# Patient Record
Sex: MALE | Race: WHITE | NOT HISPANIC OR LATINO | Employment: OTHER | URBAN - METROPOLITAN AREA
[De-identification: names, ages, dates, MRNs, and addresses within clinical notes are randomized per-mention and may not be internally consistent; named-entity substitution may affect disease eponyms.]

---

## 2019-12-09 ENCOUNTER — APPOINTMENT (OUTPATIENT)
Dept: CT IMAGING | Facility: HOSPITAL | Age: 84
End: 2019-12-09

## 2019-12-09 ENCOUNTER — APPOINTMENT (OUTPATIENT)
Dept: GENERAL RADIOLOGY | Facility: HOSPITAL | Age: 84
End: 2019-12-09

## 2019-12-09 ENCOUNTER — HOSPITAL ENCOUNTER (INPATIENT)
Facility: HOSPITAL | Age: 84
LOS: 5 days | Discharge: NURSING, REHAB OR LONG-TERM CARE | End: 2019-12-14
Attending: EMERGENCY MEDICINE | Admitting: INTERNAL MEDICINE

## 2019-12-09 ENCOUNTER — APPOINTMENT (OUTPATIENT)
Dept: CARDIOLOGY | Facility: HOSPITAL | Age: 84
End: 2019-12-09

## 2019-12-09 DIAGNOSIS — Z74.09 IMPAIRED MOBILITY AND ADLS: ICD-10-CM

## 2019-12-09 DIAGNOSIS — R47.1 DYSARTHRIA: Primary | ICD-10-CM

## 2019-12-09 DIAGNOSIS — Z78.9 IMPAIRED MOBILITY AND ADLS: ICD-10-CM

## 2019-12-09 DIAGNOSIS — R53.1 LEFT-SIDED WEAKNESS: ICD-10-CM

## 2019-12-09 DIAGNOSIS — I63.9 ACUTE CVA (CEREBROVASCULAR ACCIDENT) (HCC): ICD-10-CM

## 2019-12-09 DIAGNOSIS — R41.841 COGNITIVE COMMUNICATION DISORDER: ICD-10-CM

## 2019-12-09 DIAGNOSIS — R13.12 OROPHARYNGEAL DYSPHAGIA: ICD-10-CM

## 2019-12-09 PROBLEM — N17.9 AKI (ACUTE KIDNEY INJURY) (HCC): Status: ACTIVE | Noted: 2019-12-09

## 2019-12-09 PROBLEM — I10 ESSENTIAL HYPERTENSION: Status: ACTIVE | Noted: 2019-12-09

## 2019-12-09 LAB
ALT SERPL W P-5'-P-CCNC: 16 U/L (ref 1–41)
APTT PPP: 29.7 SECONDS (ref 24–37)
AST SERPL-CCNC: 21 U/L (ref 1–40)
BASOPHILS # BLD AUTO: 0.03 10*3/MM3 (ref 0–0.2)
BASOPHILS NFR BLD AUTO: 0.3 % (ref 0–1.5)
BH CV XLRA MEAS LEFT CCA RATIO VEL: 45.8 CM/SEC
BH CV XLRA MEAS LEFT DIST CCA EDV: 12.2 CM/SEC
BH CV XLRA MEAS LEFT DIST CCA PSV: 46.7 CM/SEC
BH CV XLRA MEAS LEFT DIST ICA EDV: 14.4 CM/SEC
BH CV XLRA MEAS LEFT DIST ICA PSV: 55.4 CM/SEC
BH CV XLRA MEAS LEFT ICA RATIO VEL: 75.1 CM/SEC
BH CV XLRA MEAS LEFT ICA/CCA RATIO: 1.6
BH CV XLRA MEAS LEFT MID CCA EDV: 11.8 CM/SEC
BH CV XLRA MEAS LEFT MID CCA PSV: 46.3 CM/SEC
BH CV XLRA MEAS LEFT MID ICA EDV: 17.5 CM/SEC
BH CV XLRA MEAS LEFT MID ICA PSV: 75.5 CM/SEC
BH CV XLRA MEAS LEFT PROX CCA EDV: 9.2 CM/SEC
BH CV XLRA MEAS LEFT PROX CCA PSV: 52.8 CM/SEC
BH CV XLRA MEAS LEFT PROX ECA PSV: 85.6 CM/SEC
BH CV XLRA MEAS LEFT PROX ICA EDV: 12.7 CM/SEC
BH CV XLRA MEAS LEFT PROX ICA PSV: 51.1 CM/SEC
BH CV XLRA MEAS LEFT PROX SCLA PSV: 52.9 CM/SEC
BH CV XLRA MEAS LEFT VERTEBRAL A PSV: 35.8 CM/SEC
BH CV XLRA MEAS RIGHT CCA RATIO VEL: 50.6 CM/SEC
BH CV XLRA MEAS RIGHT DIST CCA EDV: 8.3 CM/SEC
BH CV XLRA MEAS RIGHT DIST CCA PSV: 50.2 CM/SEC
BH CV XLRA MEAS RIGHT DIST ICA EDV: 18.9 CM/SEC
BH CV XLRA MEAS RIGHT DIST ICA PSV: 72.3 CM/SEC
BH CV XLRA MEAS RIGHT ICA RATIO VEL: 62.2 CM/SEC
BH CV XLRA MEAS RIGHT ICA/CCA RATIO: 1.2
BH CV XLRA MEAS RIGHT MID CCA EDV: 10.9 CM/SEC
BH CV XLRA MEAS RIGHT MID CCA PSV: 51.1 CM/SEC
BH CV XLRA MEAS RIGHT MID ICA EDV: 13.6 CM/SEC
BH CV XLRA MEAS RIGHT MID ICA PSV: 62.5 CM/SEC
BH CV XLRA MEAS RIGHT PROX CCA EDV: 12.7 CM/SEC
BH CV XLRA MEAS RIGHT PROX CCA PSV: 70.3 CM/SEC
BH CV XLRA MEAS RIGHT PROX ECA PSV: 103.1 CM/SEC
BH CV XLRA MEAS RIGHT PROX ICA EDV: 10.3 CM/SEC
BH CV XLRA MEAS RIGHT PROX ICA PSV: 30.2 CM/SEC
BH CV XLRA MEAS RIGHT PROX SCLA PSV: 53.7 CM/SEC
BH CV XLRA MEAS RIGHT VERTEBRAL A PSV: 34.7 CM/SEC
BUN BLDA-MCNC: 27 MG/DL (ref 8–26)
CA-I BLDA-SCNC: 1.11 MMOL/L (ref 1.2–1.32)
CHLORIDE BLDA-SCNC: 99 MMOL/L (ref 98–109)
CO2 BLDA-SCNC: 30 MMOL/L (ref 24–29)
CREAT BLDA-MCNC: 1.6 MG/DL (ref 0.6–1.3)
DEPRECATED RDW RBC AUTO: 48.4 FL (ref 37–54)
EOSINOPHIL # BLD AUTO: 0.07 10*3/MM3 (ref 0–0.4)
EOSINOPHIL NFR BLD AUTO: 0.8 % (ref 0.3–6.2)
ERYTHROCYTE [DISTWIDTH] IN BLOOD BY AUTOMATED COUNT: 13.3 % (ref 12.3–15.4)
GLUCOSE BLDC GLUCOMTR-MCNC: 100 MG/DL (ref 70–130)
GLUCOSE BLDC GLUCOMTR-MCNC: 100 MG/DL (ref 70–130)
GLUCOSE BLDC GLUCOMTR-MCNC: 173 MG/DL (ref 70–130)
HCT VFR BLD AUTO: 39.6 % (ref 37.5–51)
HCT VFR BLDA CALC: 39 % (ref 38–51)
HGB BLD-MCNC: 12.9 G/DL (ref 13–17.7)
HGB BLDA-MCNC: 13.3 G/DL (ref 12–17)
HOLD SPECIMEN: NORMAL
HOLD SPECIMEN: NORMAL
IMM GRANULOCYTES # BLD AUTO: 0.05 10*3/MM3 (ref 0–0.05)
IMM GRANULOCYTES NFR BLD AUTO: 0.6 % (ref 0–0.5)
INR PPP: 1 (ref 0.8–1.2)
LYMPHOCYTES # BLD AUTO: 0.68 10*3/MM3 (ref 0.7–3.1)
LYMPHOCYTES NFR BLD AUTO: 7.7 % (ref 19.6–45.3)
MCH RBC QN AUTO: 32.3 PG (ref 26.6–33)
MCHC RBC AUTO-ENTMCNC: 32.6 G/DL (ref 31.5–35.7)
MCV RBC AUTO: 99 FL (ref 79–97)
MONOCYTES # BLD AUTO: 0.89 10*3/MM3 (ref 0.1–0.9)
MONOCYTES NFR BLD AUTO: 10.1 % (ref 5–12)
NEUTROPHILS # BLD AUTO: 7.11 10*3/MM3 (ref 1.7–7)
NEUTROPHILS NFR BLD AUTO: 80.5 % (ref 42.7–76)
NRBC BLD AUTO-RTO: 0 /100 WBC (ref 0–0.2)
PLATELET # BLD AUTO: 197 10*3/MM3 (ref 140–450)
PMV BLD AUTO: 10.3 FL (ref 6–12)
POTASSIUM BLDA-SCNC: 3.7 MMOL/L (ref 3.5–4.9)
PROTHROMBIN TIME: 12.4 SECONDS (ref 12.8–15.2)
RBC # BLD AUTO: 4 10*6/MM3 (ref 4.14–5.8)
SODIUM BLDA-SCNC: 140 MMOL/L (ref 138–146)
TROPONIN T SERPL-MCNC: 0.06 NG/ML (ref 0–0.03)
TROPONIN T SERPL-MCNC: 0.09 NG/ML (ref 0–0.03)
WBC NRBC COR # BLD: 8.83 10*3/MM3 (ref 3.4–10.8)
WHOLE BLOOD HOLD SPECIMEN: NORMAL
WHOLE BLOOD HOLD SPECIMEN: NORMAL

## 2019-12-09 PROCEDURE — 93880 EXTRACRANIAL BILAT STUDY: CPT

## 2019-12-09 PROCEDURE — 0042T HC CT CEREBRAL PERFUSION W/WO CONTRAST: CPT

## 2019-12-09 PROCEDURE — 0 IOPAMIDOL PER 1 ML: Performed by: EMERGENCY MEDICINE

## 2019-12-09 PROCEDURE — 93005 ELECTROCARDIOGRAM TRACING: CPT | Performed by: EMERGENCY MEDICINE

## 2019-12-09 PROCEDURE — 99285 EMERGENCY DEPT VISIT HI MDM: CPT

## 2019-12-09 PROCEDURE — 85610 PROTHROMBIN TIME: CPT

## 2019-12-09 PROCEDURE — 82962 GLUCOSE BLOOD TEST: CPT

## 2019-12-09 PROCEDURE — 85014 HEMATOCRIT: CPT

## 2019-12-09 PROCEDURE — 84460 ALANINE AMINO (ALT) (SGPT): CPT | Performed by: EMERGENCY MEDICINE

## 2019-12-09 PROCEDURE — 99222 1ST HOSP IP/OBS MODERATE 55: CPT | Performed by: INTERNAL MEDICINE

## 2019-12-09 PROCEDURE — 85025 COMPLETE CBC W/AUTO DIFF WBC: CPT | Performed by: EMERGENCY MEDICINE

## 2019-12-09 PROCEDURE — 84450 TRANSFERASE (AST) (SGOT): CPT | Performed by: EMERGENCY MEDICINE

## 2019-12-09 PROCEDURE — 70498 CT ANGIOGRAPHY NECK: CPT

## 2019-12-09 PROCEDURE — 80047 BASIC METABLC PNL IONIZED CA: CPT

## 2019-12-09 PROCEDURE — 93880 EXTRACRANIAL BILAT STUDY: CPT | Performed by: INTERNAL MEDICINE

## 2019-12-09 PROCEDURE — 63710000001 ATORVASTATIN 40 MG TABLET: Performed by: INTERNAL MEDICINE

## 2019-12-09 PROCEDURE — 92610 EVALUATE SWALLOWING FUNCTION: CPT

## 2019-12-09 PROCEDURE — 71045 X-RAY EXAM CHEST 1 VIEW: CPT

## 2019-12-09 PROCEDURE — 70450 CT HEAD/BRAIN W/O DYE: CPT

## 2019-12-09 PROCEDURE — 84484 ASSAY OF TROPONIN QUANT: CPT | Performed by: EMERGENCY MEDICINE

## 2019-12-09 PROCEDURE — A9270 NON-COVERED ITEM OR SERVICE: HCPCS | Performed by: INTERNAL MEDICINE

## 2019-12-09 PROCEDURE — 70496 CT ANGIOGRAPHY HEAD: CPT

## 2019-12-09 PROCEDURE — 63710000001 ASPIRIN 81 MG CHEWABLE TABLET: Performed by: INTERNAL MEDICINE

## 2019-12-09 PROCEDURE — 85730 THROMBOPLASTIN TIME PARTIAL: CPT | Performed by: EMERGENCY MEDICINE

## 2019-12-09 RX ORDER — ASPIRIN 300 MG/1
300 SUPPOSITORY RECTAL DAILY
Status: DISCONTINUED | OUTPATIENT
Start: 2019-12-09 | End: 2019-12-12

## 2019-12-09 RX ORDER — HYDROCHLOROTHIAZIDE 25 MG/1
25 TABLET ORAL DAILY
COMMUNITY
End: 2020-02-07 | Stop reason: HOSPADM

## 2019-12-09 RX ORDER — SODIUM CHLORIDE 0.9 % (FLUSH) 0.9 %
10 SYRINGE (ML) INJECTION EVERY 12 HOURS SCHEDULED
Status: DISCONTINUED | OUTPATIENT
Start: 2019-12-09 | End: 2019-12-14 | Stop reason: HOSPADM

## 2019-12-09 RX ORDER — SODIUM CHLORIDE 0.9 % (FLUSH) 0.9 %
10 SYRINGE (ML) INJECTION AS NEEDED
Status: DISCONTINUED | OUTPATIENT
Start: 2019-12-09 | End: 2019-12-14 | Stop reason: HOSPADM

## 2019-12-09 RX ORDER — ONDANSETRON 2 MG/ML
4 INJECTION INTRAMUSCULAR; INTRAVENOUS EVERY 6 HOURS PRN
Status: DISCONTINUED | OUTPATIENT
Start: 2019-12-09 | End: 2019-12-14 | Stop reason: HOSPADM

## 2019-12-09 RX ORDER — SODIUM CHLORIDE 9 MG/ML
100 INJECTION, SOLUTION INTRAVENOUS CONTINUOUS
Status: ACTIVE | OUTPATIENT
Start: 2019-12-09 | End: 2019-12-10

## 2019-12-09 RX ORDER — ACETAMINOPHEN 325 MG/1
650 TABLET ORAL EVERY 4 HOURS PRN
Status: DISCONTINUED | OUTPATIENT
Start: 2019-12-09 | End: 2019-12-14 | Stop reason: HOSPADM

## 2019-12-09 RX ORDER — LOSARTAN POTASSIUM 25 MG/1
25 TABLET ORAL DAILY
Status: ON HOLD | COMMUNITY
End: 2020-02-07 | Stop reason: SDUPTHER

## 2019-12-09 RX ORDER — ACETAMINOPHEN 650 MG/1
650 SUPPOSITORY RECTAL EVERY 4 HOURS PRN
Status: DISCONTINUED | OUTPATIENT
Start: 2019-12-09 | End: 2019-12-14 | Stop reason: HOSPADM

## 2019-12-09 RX ORDER — ACETAMINOPHEN 160 MG/5ML
650 SOLUTION ORAL EVERY 4 HOURS PRN
Status: DISCONTINUED | OUTPATIENT
Start: 2019-12-09 | End: 2019-12-14 | Stop reason: HOSPADM

## 2019-12-09 RX ORDER — ATORVASTATIN CALCIUM 40 MG/1
80 TABLET, FILM COATED ORAL NIGHTLY
Status: DISCONTINUED | OUTPATIENT
Start: 2019-12-09 | End: 2019-12-14 | Stop reason: HOSPADM

## 2019-12-09 RX ORDER — ASPIRIN 81 MG/1
81 TABLET, CHEWABLE ORAL DAILY
Status: DISCONTINUED | OUTPATIENT
Start: 2019-12-09 | End: 2019-12-12

## 2019-12-09 RX ADMIN — SODIUM CHLORIDE 500 ML: 9 INJECTION, SOLUTION INTRAVENOUS at 12:35

## 2019-12-09 RX ADMIN — SODIUM CHLORIDE 100 ML/HR: 9 INJECTION, SOLUTION INTRAVENOUS at 17:31

## 2019-12-09 RX ADMIN — IOPAMIDOL 100 ML: 755 INJECTION, SOLUTION INTRAVENOUS at 11:33

## 2019-12-09 RX ADMIN — ASPIRIN 81 MG 81 MG: 81 TABLET ORAL at 17:30

## 2019-12-09 RX ADMIN — ATORVASTATIN CALCIUM 80 MG: 40 TABLET, FILM COATED ORAL at 21:07

## 2019-12-09 NOTE — PLAN OF CARE
Problem: Patient Care Overview  Goal: Plan of Care Review  Outcome: Ongoing (interventions implemented as appropriate)  Flowsheets (Taken 12/9/2019 1614)  Plan of Care Reviewed With: patient  Note:   SLP evaluation completed. Will proceed w/ MBS tomorrow to further assess swallow function . Please see note for further details and recommendations.

## 2019-12-09 NOTE — THERAPY EVALUATION
Acute Care - Speech Language Pathology   Swallow Initial Evaluation [facility]   Clinical Swallow Evaluation     Patient Name: Tho Kohli  : 10/8/1927  MRN: [id number]  Today's Date: 2019               Admit Date: 2019    Visit Dx:     ICD-10-CM ICD-9-CM   1. Dysarthria R47.1 784.51   2. Left-sided weakness R53.1 728.87     Patient Active Problem List   Diagnosis   • Stroke (CMS/HCC)   • Essential hypertension   • Dysarthria   • SALVADOR (acute kidney injury) (CMS/[facility])     Past Medical History:   Diagnosis Date   • OAB (overactive bladder)      History reviewed. No pertinent surgical history.     SWALLOW EVALUATION (last 72 hours)      SLP Adult Swallow Evaluation     Row Name 19 2468                   Rehab Evaluation    Document Type  evaluation  -MP        Subjective Information  no complaints  -MP        Patient Observations  alert;cooperative  -MP        Patient/Family Observations  No family present  -MP        Patient Effort  good  -MP           General Information    Patient Profile Reviewed  yes  -MP        Pertinent History Of Current Problem  Adm  w/ suspected stroke. Imaging negative so far but MRI pending. Hx HTN, SDH , OAB. Lives @ assisted living facility but independent w/ ADLs. Failed RN dysphagia screen 2' L facial droop.  -MP        Current Method of Nutrition  NPO  -MP        Precautions/Limitations, Vision  WFL with corrective lenses  -MP        Precautions/Limitations, Hearing  WFL;for purposes of eval  -MP        Prior Level of Function-Communication  WFL  -MP        Prior Level of Function-Swallowing  no diet consistency restrictions  -MP        Plans/Goals Discussed with  patient;agreed upon  -MP        Barriers to Rehab  none identified  -MP        Patient's Goals for Discharge  patient did not state  -MP           Pain Assessment    Additional Documentation  Pain Scale: FACES Pre/Post-Treatment (Group)  -MP           Pain Scale: FACES Pre/Post-Treatment     Pain: FACES Scale, Pretreatment  0-->no hurt  -MP        Pain: FACES Scale, Post-Treatment  0-->no hurt  -MP           Oral Motor and Function    Dentition Assessment  upper dentures/partial in place;lower dentures/partial in place  -MP        Secretion Management  WNL/WFL  -MP        Mucosal Quality  moist, healthy  -MP           Oral Musculature and Cranial Nerve Assessment    Oral Motor General Assessment  oral labial or buccal impairment  -MP        Oral Labial or Buccal Impairment, Detail, Cranial Nerve VII (Facial):  left labial droop  -MP        Oral Motor, Comment  Pt reports had surgical removal of skin cancer on L side of face resulting in damage of nerves and pt frequently has anterior loss on L side if not using straw  -MP           General Eating/Swallowing Observations    Eating/Swallowing Skills  fed by SLP  -MP        Positioning During Eating  upright in bed  -MP        Utensils Used  spoon;cup;straw  -MP        Consistencies Trialed  thin liquids;nectar/syrup-thick liquids;pureed;regular textures  -MP           Clinical Swallow Eval    Oral Prep Phase  impaired  -MP        Oral Transit  WFL  -MP        Oral Residue  WFL  -MP        Pharyngeal Phase  suspected pharyngeal impairment  -MP        Clinical Swallow Evaluation Summary  Clinical swallow evaluation completed. Pt given trials of ice chipx1, thin via tsp, NTL via tsp/cup/straw, puree, & regular solid. Immediate cough w/ ice & thin. No overt s/sxs w/ NTL, puree, or solid. Rec dysphagia level IV diet w/ NTL, meds in pudding/puree. Standard aspiration precautions. SLP will proceed w/ MBS tomorrow 12/10 to further assess swallow function.  -MP           Oral Prep Concerns    Oral Prep Concerns  prolonged mastication  -MP        Prolonged Mastication  regular consistencies  -MP           Pharyngeal Phase Concerns    Pharyngeal Phase Concerns  cough  -MP        Cough  thin  -MP           Clinical Impression    SLP Swallowing Diagnosis  suspected  pharyngeal dysfunction  -MP        Functional Impact  risk of aspiration/pneumonia  -MP        Rehab Potential/Prognosis, Swallowing  good, to achieve stated therapy goals  -MP        Swallow Criteria for Skilled Therapeutic Interventions Met  demonstrates skilled criteria  -MP           Recommendations    SLP Diet Recommendation  mechanical soft with no mixed consistencies;nectar thick liquids  -MP        Recommended Diagnostics  VFSS (MBS)  -MP        Recommended Precautions and Strategies  upright posture during/after eating;small bites of food and sips of liquid  -MP        SLP Rec. for Method of Medication Administration  meds whole;meds crushed;with pudding or applesauce  -MP        Monitor for Signs of Aspiration  yes;notify SLP if any concerns  -MP        Anticipated Dischage Disposition  unknown  -MP          User Key  (r) = Recorded By, (t) = Taken By, (c) = Cosigned By    Initials Name Effective Dates    Stephan Boucher MS CCC-SLP 06/19/19 -           EDUCATION  The patient has been educated in the following areas:   Dysphagia (Swallowing Impairment) Modified Diet Instruction.    SLP Recommendation and Plan  SLP Swallowing Diagnosis: suspected pharyngeal dysfunction  SLP Diet Recommendation: mechanical soft with no mixed consistencies, nectar thick liquids  Recommended Precautions and Strategies: upright posture during/after eating, small bites of food and sips of liquid  SLP Rec. for Method of Medication Administration: meds whole, meds crushed, with pudding or applesauce     Monitor for Signs of Aspiration: yes, notify SLP if any concerns  Recommended Diagnostics: VFSS (MBS)  Swallow Criteria for Skilled Therapeutic Interventions Met: demonstrates skilled criteria  Anticipated Dischage Disposition: unknown  Rehab Potential/Prognosis, Swallowing: good, to achieve stated therapy goals             Plan of Care Reviewed With: patient           Time Calculation:   Time Calculation- SLP     Row Name  12/09/19 1615             Time Calculation- SLP    SLP Start Time  1550  -MP        User Key  (r) = Recorded By, (t) = Taken By, (c) = Cosigned By    Initials Name Provider Type    Stephan Boucher MS CCC-SLP Speech and Language Pathologist          Therapy Charges for Today     Code Description Service Date Service Provider Modifiers Qty    64959570075 HC ST EVAL ORAL PHARYNG SWALLOW 3 12/9/2019 Stephan Michelle MS CCC-SLP GN 1               Stephan Michelle MS CCC-RAKESH  12/9/2019

## 2019-12-09 NOTE — ED PROVIDER NOTES
"Subjective   Tho Kohli is a 89 y.o. male who presents to the ED via EMS with complaints of neurological issues since this morning. Per EMS, patient has generalized weakness, left sided facial asymmetry, slurred speech, and trouble swallowing. However, he is alert and oriented. EMS reports that patient suffered a fall without injury this morning between 0700 and 0800. The patient states that he had left sided weakness since waking up this morning, however, he has been able to get around his home. He states that around 0800 he slipped in his socks and fell. Per EMS, staff at [facility] called them because they found the patient on the floor and the patient has \"just not been acting right\". The patient reports that he went to bed around 2245 last night and felt fine at the time. EMS advises that the patient lives alone and takes care of himself. The patient is on Losartan and HCTZ, however, no anticoagulants were found in his home, per EMS. His son reports the patient had a subdural hematoma 12 years ago for which he saw Dr. Amos, neurosurgery. There are no other acute complaints at this time.      History provided by:  Patient, EMS personnel and relative  Neurologic Problem   The patient's primary symptoms include focal weakness (left), slurred speech and weakness (generalized, worse on left). This is a new problem. The current episode started today. The neurological problem developed suddenly. The last time the patient was known to be well was 12/8/2019 10:45 PM.  The problem is unchanged. Associated symptoms include confusion. Past treatments include nothing. His past medical history is significant for head trauma (subdural hematoma).       Review of Systems   HENT: Positive for trouble swallowing.    Neurological: Positive for focal weakness (left), facial asymmetry (left), speech difficulty and weakness (generalized, worse on left).   Psychiatric/Behavioral: Positive for confusion.   All other systems " reviewed and are negative.      Past Medical History:   Diagnosis Date   • OAB (overactive bladder)        No Known Allergies    History reviewed. No pertinent surgical history.    History reviewed. No pertinent family history.    Social History     Socioeconomic History   • Marital status: Single     Spouse name: Not on file   • Number of children: Not on file   • Years of education: Not on file   • Highest education level: Not on file   Tobacco Use   • Smoking status: Never Smoker   • Smokeless tobacco: Never Used   Substance and Sexual Activity   • Drug use: Never   • Sexual activity: Defer         Objective   Physical Exam   Constitutional: He is oriented to person, place, and time. He appears well-developed and well-nourished.   HENT:   Head: Normocephalic and atraumatic.   Nose: Nose normal.   Eyes: Conjunctivae are normal. No scleral icterus.   Neck: Normal range of motion. Neck supple.   Cardiovascular: Normal rate, regular rhythm, normal heart sounds and intact distal pulses.   No murmur heard.  Pulmonary/Chest: Effort normal and breath sounds normal. No respiratory distress.   Abdominal: Soft. He exhibits no distension. There is no tenderness.   Musculoskeletal: Normal range of motion. He exhibits no deformity.   Neurological: He is alert and oriented to person, place, and time.   Ataxic. Drift in left upper extremity and left lower extremity. Drift in right lower extremity. Dysarthria. Left sided facial droop.   Skin: Skin is warm and dry.   Psychiatric: He has a normal mood and affect. His behavior is normal. His speech is slurred.   Nursing note and vitals reviewed.      Critical Care  Performed by: Ashutosh Velazquez MD  Authorized by: Ashutosh Velazquez MD     Critical care provider statement:     Critical care time (minutes):  30    Critical care time was exclusive of:  Separately billable procedures and treating other patients    Critical care was necessary to treat or prevent imminent or  life-threatening deterioration of the following conditions:  CNS failure or compromise    Critical care was time spent personally by me on the following activities:  Development of treatment plan with patient or surrogate, discussions with consultants, evaluation of patient's response to treatment, examination of patient, interpretation of cardiac output measurements, obtaining history from patient or surrogate, review of old charts, re-evaluation of patient's condition, ordering and review of radiographic studies, pulse oximetry, ordering and review of laboratory studies and ordering and performing treatments and interventions  Comments:      Responded immediately upon arrival for code stroke, considered use of IV TPA, but last known normal was greater than 4.5 hours. Considered cath lab intervention but no large vessel occlusion was found.             ED Course  ED Course as of Dec 09 1450   Mon Dec 09, 2019   1123 Patient's son, Juventino Barajas: [phone]    [NP]   5782 At bedside re-evaluating the patient and updating him and his son on labs/imaging thus far, as well as, need for admission. Patient' son tells us that the patient definitely has some deficits. -MAS    [NP]   2373 Paged hospitalist. -MAS    [NP]   8630 Discussed the case with Dr. Valerio, hospitalist, who agrees to admit. -MAS    [NP]      ED Course User Index  [NP] Madalyn Martinez     Recent Results (from the past 24 hour(s))   POC Protime / INR    Collection Time: 12/09/19 11:30 AM   Result Value Ref Range    Protime 12.4 (L) 12.8 - 15.2 seconds    INR 1.0 0.8 - 1.2   POC CHEM 8    Collection Time: 12/09/19 11:31 AM   Result Value Ref Range    Glucose 100 70 - 130 mg/dL    BUN 27 (H) 8 - 26 mg/dL    Creatinine 1.60 (H) 0.60 - 1.30 mg/dL    Sodium 140 138 - 146 mmol/L    Potassium 3.7 3.5 - 4.9 mmol/L    Chloride 99 98 - 109 mmol/L    Total CO2 30 (H) 24 - 29 mmol/L    Hemoglobin 13.3 12.0 - 17.0 g/dL    Hematocrit 39 38 - 51 %    Ionized Calcium  1.11 (L) 1.20 - 1.32 mmol/L   Troponin    Collection Time: 12/09/19 11:36 AM   Result Value Ref Range    Troponin T 0.055 (C) 0.000 - 0.030 ng/mL   aPTT    Collection Time: 12/09/19 11:36 AM   Result Value Ref Range    PTT 29.7 24.0 - 37.0 seconds   AST    Collection Time: 12/09/19 11:36 AM   Result Value Ref Range    AST (SGOT) 21 1 - 40 U/L   ALT    Collection Time: 12/09/19 11:36 AM   Result Value Ref Range    ALT (SGPT) 16 1 - 41 U/L   Light Blue Top    Collection Time: 12/09/19 11:36 AM   Result Value Ref Range    Extra Tube hold for add-on    Green Top (Gel)    Collection Time: 12/09/19 11:36 AM   Result Value Ref Range    Extra Tube Hold for add-ons.    Lavender Top    Collection Time: 12/09/19 11:36 AM   Result Value Ref Range    Extra Tube hold for add-on    Gold Top - SST    Collection Time: 12/09/19 11:36 AM   Result Value Ref Range    Extra Tube Hold for add-ons.    CBC Auto Differential    Collection Time: 12/09/19 11:36 AM   Result Value Ref Range    WBC 8.83 3.40 - 10.80 10*3/mm3    RBC 4.00 (L) 4.14 - 5.80 10*6/mm3    Hemoglobin 12.9 (L) 13.0 - 17.7 g/dL    Hematocrit 39.6 37.5 - 51.0 %    MCV 99.0 (H) 79.0 - 97.0 fL    MCH 32.3 26.6 - 33.0 pg    MCHC 32.6 31.5 - 35.7 g/dL    RDW 13.3 12.3 - 15.4 %    RDW-SD 48.4 37.0 - 54.0 fl    MPV 10.3 6.0 - 12.0 fL    Platelets 197 140 - 450 10*3/mm3    Neutrophil % 80.5 (H) 42.7 - 76.0 %    Lymphocyte % 7.7 (L) 19.6 - 45.3 %    Monocyte % 10.1 5.0 - 12.0 %    Eosinophil % 0.8 0.3 - 6.2 %    Basophil % 0.3 0.0 - 1.5 %    Immature Grans % 0.6 (H) 0.0 - 0.5 %    Neutrophils, Absolute 7.11 (H) 1.70 - 7.00 10*3/mm3    Lymphocytes, Absolute 0.68 (L) 0.70 - 3.10 10*3/mm3    Monocytes, Absolute 0.89 0.10 - 0.90 10*3/mm3    Eosinophils, Absolute 0.07 0.00 - 0.40 10*3/mm3    Basophils, Absolute 0.03 0.00 - 0.20 10*3/mm3    Immature Grans, Absolute 0.05 0.00 - 0.05 10*3/mm3    nRBC 0.0 0.0 - 0.2 /100 WBC     Note: In addition to lab results from this visit, the labs  listed above may include labs taken at another facility or during a different encounter within the last 24 hours. Please correlate lab times with ED admission and discharge times for further clarification of the services performed during this visit.    XR Chest 1 View   Preliminary Result   1.  What appears to be a large hiatal hernia measuring up to 16 cm in   transverse diameter with an air-fluid level. Consider follow-up CT   imaging of the chest for further evaluation if this is not clinically   known.   2.  Chronic changes in the lungs identified without evidence of active   airspace disease.       D:  12/09/2019   E:  12/09/2019                  CT Angiogram Head   Preliminary Result   1. CTA evaluation of the head and neck demonstrates no convincing   evidence for large territorial vessel occlusion or stenosis.       2. There is focal prominence of the cervical left internal carotid   artery measuring up to 1.1 cm.       3. Incidentally identified 1.5 cm left upper lobe pulmonary nodule which   could relate to scarring although follow-up is clinically indicated when   the patient is able.       D:  12/09/2019   E:  12/09/2019          CT Angiogram Neck   Preliminary Result   1. CTA evaluation of the head and neck demonstrates no convincing   evidence for large territorial vessel occlusion or stenosis.       2. There is focal prominence of the cervical left internal carotid   artery measuring up to 1.1 cm.       3. Incidentally identified 1.5 cm left upper lobe pulmonary nodule which   could relate to scarring although follow-up is clinically indicated when   the patient is able.       D:  12/09/2019   E:  12/09/2019          CT Cerebral Perfusion With & Without Contrast   Preliminary Result   No convincing perfusion abnormality within a vascular   territory to suggest large ischemic defect. There is focal increased   TMAX abnormality within the right parietal and multifocal regions within   the occipital lobe  which could be artifactual/slow flow related however   small vessel infarcts are not excluded. Consider follow-up MRI imaging   of brain which would provide a more detailed evaluation.       D:  12/09/2019   E:  12/09/2019          CT Head Without Contrast Stroke Protocol   Preliminary Result   1.  No convincing evidence for acute intracranial pathology identified.   There is linear hyperattenuation along the left cerebral convexity   adjacent to the prior craniotomy defect favored to represent residual   thickened dura rather than hemorrhage. Consider follow-up imaging on MRI   which may provide additional evaluation.       2. Multifocal confluent hypoattenuation within the white matter as   described above most pronounced involving the right parietal lobe. Small   infarct/ischemia would be difficult to entirely exclude. Attention on   follow-up more advanced imaging advised.       These findings were discussed with Dr. Velazquez at exam time 11:16 AM on   12/09/2019.       D:  12/09/2019   E:  12/09/2019              MRI Brain Without Contrast    (Results Pending)     Vitals:    12/09/19 1230 12/09/19 1300 12/09/19 1315 12/09/19 1415   BP: 141/92 122/85 127/88 128/92   Pulse: 81 65 76 69   Resp:       Temp:       TempSrc:       SpO2:  94% 95% 96%   Weight:       Height:         Medications   sodium chloride 0.9 % flush 10 mL (has no administration in time range)   sodium chloride 0.9 % flush 10 mL (has no administration in time range)   sodium chloride 0.9 % flush 10 mL (has no administration in time range)   atorvastatin (LIPITOR) tablet 80 mg (has no administration in time range)   aspirin chewable tablet 81 mg (has no administration in time range)     Or   aspirin suppository 300 mg (has no administration in time range)   acetaminophen (TYLENOL) tablet 650 mg (has no administration in time range)     Or   acetaminophen (TYLENOL) 160 MG/5ML solution 650 mg (has no administration in time range)     Or    acetaminophen (TYLENOL) suppository 650 mg (has no administration in time range)   ondansetron (ZOFRAN) injection 4 mg (has no administration in time range)   sodium chloride 0.9 % infusion (has no administration in time range)   iopamidol (ISOVUE-370) 76 % injection 100 mL (100 mL Intravenous Given 12/9/19 1133)   sodium chloride 0.9 % bolus 500 mL (0 mL Intravenous Stopped 12/9/19 1305)     ECG/EMG Results (last 24 hours)     Procedure Component Value Units Date/Time    ECG 12 Lead [[id number]] Collected:  12/09/19 1145     Updated:  12/09/19 1154    ECG 12 Lead [[id number]] Collected:  12/09/19 1415     Updated:  12/09/19 1419        ECG 12 Lead   Final Result   Test Reason : 2ND SET   Blood Pressure : **/** mmHG   Vent. Rate : 070 BPM     Atrial Rate : 070 BPM      P-R Int : 152 ms          QRS Dur : 140 ms       QT Int : 446 ms       P-R-T Axes : 050 -23 021 degrees      QTc Int : 481 ms      Normal sinus rhythm   Right bundle branch block   Left ventricular hypertrophy with QRS widening   Abnormal ECG   When compared with ECG of 09-DEC-2019 11:45, (Unconfirmed)   No significant change was found   Confirmed by KRISTIN PETE MD ([id number]) on 12/9/2019 6:47:25 PM      Referred By:  FAM           Confirmed By:KRISTIN PETE MD      ECG 12 Lead   Final Result   Test Reason : Acute Stroke Protocol (onset < 12 hrs)   Blood Pressure : **/** mmHG   Vent. Rate : 077 BPM     Atrial Rate : 077 BPM      P-R Int : 168 ms          QRS Dur : 136 ms       QT Int : 424 ms       P-R-T Axes : 037 -26 007 degrees      QTc Int : 479 ms      Normal sinus rhythm   Right bundle branch block   Left ventricular hypertrophy with QRS widening   Abnormal ECG   No previous ECGs available   Confirmed by KRISTIN PETE MD ([id number]) on 12/9/2019 6:48:13 PM      Referred By:  SERGEY           Confirmed By:KRISTIN PETE MD                          [facility]    Final diagnoses:   Dysarthria   Left-sided weakness   Acute CVA (cerebrovascular accident) (CMS/HCC)        Documentation assistance provided by abril Martinez.  Information recorded by the scribe was done at my direction and has been verified and validated by me.       Madalyn Martinez  12/09/19 1458       sAhutosh Velazquez MD  12/10/19 1736       Ashutosh Velazquez MD  12/10/19 1737

## 2019-12-09 NOTE — H&P
[facility] Medicine Services  HISTORY AND PHYSICAL    Patient Name: Tho Kohli  : 10/8/1927  MRN: [id number]  Primary Care Physician: Daniel Adams MD  Date of admission: 2019      Subjective   Subjective     Chief Complaint:  Left sided weakness, dysarthria    HPI:  Tho Kohli is a 89 y.o. male relatively health male with PMHx significant for HTN, SDH , OAB who presents to [facility] from his assisted living facility after being found down around 10am this morning. Patient reports getting up this morning to go to the bathroom and tripping and being unable to get up. He reports feeling like his left side was weak. Currently he endorses some left facial droop and difficulty with his speech. He denies visual changes, headache, chest pain, SOA. Prior to today, independent with all his ADLs. CT imaging on admission all negative. MRI is pending.    Review of Systems   Gen- No fevers, chills  CV- No chest pain, palpitations  Resp- No cough, dyspnea  GI- No N/V/D, abd pain    All other systems reviewed and are negative.     Personal History     Past Medical History:   Diagnosis Date   • OAB (overactive bladder)        History reviewed. No pertinent surgical history.    Family History: family history is not on file. Otherwise pertinent FHx was reviewed and unremarkable.     Social History:  reports that he has never smoked. He has never used smokeless tobacco. He reports that he does not use drugs.  Social History     Social History Narrative   • Not on file       Medications:  Available home medication information reviewed.    (Not in a hospital admission)    No Known Allergies    Objective   Objective     Vital Signs:   Temp:  [98.5 °F (36.9 °C)] 98.5 °F (36.9 °C)  Heart Rate:  [65-90] 76  Resp:  [20] 20  BP: (114-141)/(77-93) 127/88   Total (NIH Stroke Scale): 7    Physical Exam   Constitutional: Awake, alert  Eyes: PERRLA, sclerae anicteric, no conjunctival  injection  HENT: NCAT, mucous membranes are dry  Neck: Supple, no thyromegaly, no lymphadenopathy, trachea midline  Respiratory: Clear to auscultation bilaterally, nonlabored respirations   Cardiovascular: RRR, no murmurs, rubs, or gallops, palpable pedal pulses bilaterally  Gastrointestinal: Positive bowel sounds, soft, nontender, nondistended  Musculoskeletal: No bilateral ankle edema, no clubbing or cyanosis to extremities  Psychiatric: Appropriate affect, cooperative  Neurologic: Oriented x 3, very mild left sided weakness, left facial droop present, some mild dysarthria with expressive aphasia, Cranial Nerves grossly intact to confrontation  Skin: No rashes    Results Reviewed:  I have personally reviewed current lab and radiology data.    Results from last 7 days   Lab Units 12/09/19  1136  12/09/19  1130   WBC 10*3/mm3 8.83  --   --    HEMOGLOBIN g/dL 12.9*  --   --    HEMOGLOBIN, POC   --    < >  --    HEMATOCRIT % 39.6  --   --    HEMATOCRIT POC   --    < >  --    PLATELETS 10*3/mm3 197  --   --    INR   --   --  1.0    < > = values in this interval not displayed.     Results from last 7 days   Lab Units 12/09/19  1136 12/09/19  1131   CREATININE mg/dL  --  1.60*   ALT (SGPT) U/L 16  --    AST (SGOT) U/L 21  --    TROPONIN T ng/mL 0.055*  --      Estimated Creatinine Clearance: 31.6 mL/min (A) (by C-G formula based on SCr of 1.6 mg/dL (H)).  Brief Urine Lab Results     None        Imaging Results (Last 24 Hours)     Procedure Component Value Units Date/Time    CT Angiogram Head [[id number]] Collected:  12/09/19 1202     Updated:  12/09/19 1232    Narrative:       EXAMINATION: CT ANGIOGRAM HEAD-, CT ANGIOGRAM NECK- 12/09/2019     INDICATION: Stroke     TECHNIQUE: Unenhanced CT imaging of the head and neck was performed  followed by dedicated CTA imaging of the head and neck vasculature with  additional multiplanar 3-D MIP and VRT reconstructed imaging.     Stenosis measurement was performed by the NASCET or  similar method.     The radiation dose reduction device was turned on for each scan per the  ALARA (As Low as Reasonably Achievable) protocol.     COMPARISON: CT of the head 12/09/2019     FINDINGS:      UNENHANCED: The visualized lung apex demonstrates an accessory azygos  fissure. Fibronodular scarring is noted. Identified with the left lung  apex is a 1.5 x 0.8 cm nodule which may relate to scarring, although  follow-up as clinically indicated. No hyperdensity identified within the  carotid arteries to suggest intramural hematoma.     CTA IMAGING: Dedicated CTA imaging demonstrates no convincing evidence  of filling defect within the pulmonary arterial vasculature as  visualized. The ascending aorta does not demonstrate acute abnormality.  The ascending aorta measures up to 3.7 cm in maximum dimension. The  bilateral subclavian arteries appear patent. The bilateral common  carotid arteries are tortuous and demonstrates scattered atherosclerotic  calcifications without significant narrowing identified. The carotid  bifurcations are patent with only minimal calcified atherosclerotic  disease. The external carotid systems appear patent bilaterally. The  right common carotid artery is patent without evidence of occlusion or  significant stenosis. The cavernous and clinoid and supraclinoid  portions appear intact. The left common carotid artery appears patent  without significant stenosis. There is focal dilation measuring up to  1.1 cm involving the proximal cervical portion of the left internal  carotid artery. The remainder of the left internal carotid artery  appears patent without significant occlusion or stenosis identified. The  clinoid and supraclinoid portions appear patent. The right middle  cerebral artery appears patent without significant narrowing or  occlusion identified. The left middle cerebral artery appears patent  without convincing evidence for significant occlusion or narrowing. The  anterior  cerebral arteries appear patent without significant stenosis or  aneurysm identified. Persistent right fetal PCA anatomy is identified.  No significant stenosis of the right posterior cerebral artery  identified. The left posterior cerebral artery appears patent. The  basilar artery appears patent. Left dominant vertebral artery system is  identified although the vertebral arteries appear patent throughout  their course in the intervertebral foramen and into the upper chest.       Impression:       1. CTA evaluation of the head and neck demonstrates no convincing  evidence for large territorial vessel occlusion or stenosis.     2. There is focal prominence of the cervical left internal carotid  artery measuring up to 1.1 cm.     3. Incidentally identified 1.5 cm left upper lobe pulmonary nodule which  could relate to scarring although follow-up is clinically indicated when  the patient is able.     D:  12/09/2019  E:  12/09/2019       CT Angiogram Neck [[id number]] Collected:  12/09/19 1202     Updated:  12/09/19 1232    Narrative:       EXAMINATION: CT ANGIOGRAM HEAD-, CT ANGIOGRAM NECK- 12/09/2019     INDICATION: Stroke     TECHNIQUE: Unenhanced CT imaging of the head and neck was performed  followed by dedicated CTA imaging of the head and neck vasculature with  additional multiplanar 3-D MIP and VRT reconstructed imaging.     Stenosis measurement was performed by the NASCET or similar method.     The radiation dose reduction device was turned on for each scan per the  ALARA (As Low as Reasonably Achievable) protocol.     COMPARISON: CT of the head 12/09/2019     FINDINGS:      UNENHANCED: The visualized lung apex demonstrates an accessory azygos  fissure. Fibronodular scarring is noted. Identified with the left lung  apex is a 1.5 x 0.8 cm nodule which may relate to scarring, although  follow-up as clinically indicated. No hyperdensity identified within the  carotid arteries to suggest intramural hematoma.      CTA IMAGING: Dedicated CTA imaging demonstrates no convincing evidence  of filling defect within the pulmonary arterial vasculature as  visualized. The ascending aorta does not demonstrate acute abnormality.  The ascending aorta measures up to 3.7 cm in maximum dimension. The  bilateral subclavian arteries appear patent. The bilateral common  carotid arteries are tortuous and demonstrates scattered atherosclerotic  calcifications without significant narrowing identified. The carotid  bifurcations are patent with only minimal calcified atherosclerotic  disease. The external carotid systems appear patent bilaterally. The  right common carotid artery is patent without evidence of occlusion or  significant stenosis. The cavernous and clinoid and supraclinoid  portions appear intact. The left common carotid artery appears patent  without significant stenosis. There is focal dilation measuring up to  1.1 cm involving the proximal cervical portion of the left internal  carotid artery. The remainder of the left internal carotid artery  appears patent without significant occlusion or stenosis identified. The  clinoid and supraclinoid portions appear patent. The right middle  cerebral artery appears patent without significant narrowing or  occlusion identified. The left middle cerebral artery appears patent  without convincing evidence for significant occlusion or narrowing. The  anterior cerebral arteries appear patent without significant stenosis or  aneurysm identified. Persistent right fetal PCA anatomy is identified.  No significant stenosis of the right posterior cerebral artery  identified. The left posterior cerebral artery appears patent. The  basilar artery appears patent. Left dominant vertebral artery system is  identified although the vertebral arteries appear patent throughout  their course in the intervertebral foramen and into the upper chest.       Impression:       1. CTA evaluation of the head and neck  demonstrates no convincing  evidence for large territorial vessel occlusion or stenosis.     2. There is focal prominence of the cervical left internal carotid  artery measuring up to 1.1 cm.     3. Incidentally identified 1.5 cm left upper lobe pulmonary nodule which  could relate to scarring although follow-up is clinically indicated when  the patient is able.     D:  12/09/2019  E:  12/09/2019       CT Cerebral Perfusion With & Without Contrast [[id number]] Collected:  12/09/19 1152     Updated:  12/09/19 1229    Narrative:       EXAMINATION: CT CEREBRAL PERFUSION W WO CONTRAST- 12/09/2019     INDICATION: TIA, initial screening     TECHNIQUE: Cerebral perfusion analysis was performed using computed  tomography with contrast administration, including post processing of  parametric maps with determination of cerebral blood flow, cerebral  blood volume, and mean transit time.     The radiation dose reduction device was turned on for each scan per the  ALARA (As Low as Reasonably Achievable) protocol.     COMPARISON: CT of the head 12/09/2019     FINDINGS: Dedicated perfusion imaging of the brain demonstrates no focal  increased mean transit time abnormality within a vascular distribution  to suggest large territorial reversible infarcts. Rapid analysis  demonstrates focal increased mismatch volume within the posterior  circulation of the occipital lobes bilaterally with an additional  increased TMAX abnormality within the right parietal lobe measuring 11  mm greater than 6 seconds. There is some misregistration artifact along  the left cerebral convexity at the site of prior surgery/dural  thickening.       Impression:       No convincing perfusion abnormality within a vascular  territory to suggest large ischemic defect. There is focal increased  TMAX abnormality within the right parietal and multifocal regions within  the occipital lobe which could be artifactual/slow flow related however  small vessel infarcts are  not excluded. Consider follow-up MRI imaging  of brain which would provide a more detailed evaluation.     D:  12/09/2019  E:  12/09/2019       XR Chest 1 View [[id number]] Collected:  12/09/19 1205     Updated:  12/09/19 1217    Narrative:       EXAMINATION: XR CHEST 1 VW-12/09/2019:      INDICATION: Acute Stroke Protocol (onset < 12 hrs).      COMPARISON: Chest radiograph 09/30/2007.     FINDINGS: Single portable chest radiograph was submitted for review. The  heart is not particularly enlarged. There is what appears to be a very  large 16 cm transverse measured hiatal hernia with an air-fluid level.  Chronic changes in the lungs are identified without convincing evidence  for active airspace disease. No pleural effusion or pneumothorax. The  visualized upper abdomen is unrevealing. Diffuse osteopenia suggested.            Impression:       1.  What appears to be a large hiatal hernia measuring up to 16 cm in  transverse diameter with an air-fluid level. Consider follow-up CT  imaging of the chest for further evaluation if this is not clinically  known.  2.  Chronic changes in the lungs identified without evidence of active  airspace disease.     D:  12/09/2019  E:  12/09/2019             CT Head Without Contrast Stroke Protocol [[id number]] Collected:  12/09/19 1137     Updated:  12/09/19 1203    Narrative:       EXAMINATION: CT HEAD WO CONTRAST STROKE PROTOCOL- 12/09/2019     INDICATION: Stroke     TECHNIQUE: Unenhanced CT imaging of the head was performed during a code  stroke protocol     The radiation dose reduction device was turned on for each scan per the  ALARA (As Low as Reasonably Achievable) protocol.     COMPARISON: CT of the head 12/04/2007     FINDINGS: Unenhanced CT imaging of the brain demonstrates no convincing  evidence for midline shift or mass effect. No evidence of hydrocephalus.  Diffuse cerebral atrophy is identified. There is no convincing evidence  for extra-axial fluid collection.  Identified within the left lateral  extra-axial cerebral convexity is a hyperdense linear region adjacent to  a prior craniotomy defect consistent with thickened dura which is  significantly improved from 12/04/2007. There is no convincing evidence  of extra-axial hemorrhage within this region. The gray-white matter  junction demonstrate multifocal regions of low attenuation throughout  the periventricular and subcortical white matter most pronounced  involving the right parietal lobe. This could relate to chronic  microvascular ischemic change however small infarcts cannot be entirely  excluded particularly within the right parietal region. The skull  appears intact with the exception of the prior left craniotomy defect  without evidence for depressed skull fracture. Paranasal sinuses appear  clear. Incidental note of cavum septum pellucidum, an anatomic variant.       Impression:       1.  No convincing evidence for acute intracranial pathology identified.  There is linear hyperattenuation along the left cerebral convexity  adjacent to the prior craniotomy defect favored to represent residual  thickened dura rather than hemorrhage. Consider follow-up imaging on MRI  which may provide additional evaluation.     2. Multifocal confluent hypoattenuation within the white matter as  described above most pronounced involving the right parietal lobe. Small  infarct/ischemia would be difficult to entirely exclude. Attention on  follow-up more advanced imaging advised.     These findings were discussed with Dr. Velazquez at exam time 11:16 AM on  12/09/2019.     D:  12/09/2019  E:  12/09/2019                   Assessment/Plan   Assessment / Plan     Active Hospital Problems    Diagnosis POA   • **Stroke (CMS/[facility]) [I63.9] Unknown   • Essential hypertension [I10] Unknown   • Dysarthria [R47.1] Yes   • SALVADOR (acute kidney injury) (CMS/[facility]) [N17.9] Unknown     Tho Kohli is a 89 y.o. male relatively health male with PMHx  significant for HTN, SDH 2007, OAB who presents to [facility] from his assisted living facility after being found down around 10am this morning.     CVA   - CT imaging including CT head ,CT perfusion, CTA negative so far. MRI is ordered and pending  - Echo and carotid duplex  - ASA, statin therapy. Check lipid panel and A1c  - permissive hypertension, treat for >180/110  - Neurology consult.   - PT/OT/SLP  - CM for discharge planning    SALVADOR:  - creatinine 1.6, unclear if this is chronic or acute, no prior lab values for comparison  - gentle IVF overnight, repeat AM BMP     HTN:  - holding home meds to allow permissive hypertension in setting of suspected acute stroke    Overactive Bladder    DVT prophylaxis:  Mechanical due to CVA    Patient confirms DNR/DNI status  CODE STATUS:    Code Status and Medical Interventions:   Ordered at: 12/09/19 1402     Limited Support to NOT Include:    Antiarrhythmic Drugs    Intubation     Level Of Support Discussed With:    Patient     Code Status:    No CPR     Medical Interventions (Level of Support Prior to Arrest):    Limited       Admission Status:  I believe this patient meets INPATIENT status due to stroke.  I feel patient’s risk for adverse outcomes and need for care warrant INPATIENT evaluation and I predict the patient’s care encounter to likely last beyond 2 midnights.      Electronically signed by Faby Valerio DO, 12/09/19, 2:02 PM.

## 2019-12-10 ENCOUNTER — APPOINTMENT (OUTPATIENT)
Dept: MRI IMAGING | Facility: HOSPITAL | Age: 84
End: 2019-12-10

## 2019-12-10 ENCOUNTER — APPOINTMENT (OUTPATIENT)
Dept: CARDIOLOGY | Facility: HOSPITAL | Age: 84
End: 2019-12-10

## 2019-12-10 ENCOUNTER — APPOINTMENT (OUTPATIENT)
Dept: GENERAL RADIOLOGY | Facility: HOSPITAL | Age: 84
End: 2019-12-10

## 2019-12-10 PROBLEM — R94.31 ABNORMAL EKG: Status: ACTIVE | Noted: 2019-12-10

## 2019-12-10 PROBLEM — I51.89 GRADE III DIASTOLIC DYSFUNCTION: Status: ACTIVE | Noted: 2019-12-10

## 2019-12-10 PROBLEM — R77.8 ELEVATED TROPONIN: Status: ACTIVE | Noted: 2019-12-10

## 2019-12-10 LAB
ANION GAP SERPL CALCULATED.3IONS-SCNC: 12 MMOL/L (ref 5–15)
BH CV ECHO MEAS - AO MAX PG (FULL): 5.4 MMHG
BH CV ECHO MEAS - AO MAX PG: 7.9 MMHG
BH CV ECHO MEAS - AO MEAN PG (FULL): 2.1 MMHG
BH CV ECHO MEAS - AO MEAN PG: 3.2 MMHG
BH CV ECHO MEAS - AO ROOT AREA (BSA CORRECTED): 2.1
BH CV ECHO MEAS - AO ROOT AREA: 12.5 CM^2
BH CV ECHO MEAS - AO ROOT DIAM: 4 CM
BH CV ECHO MEAS - AO V2 MAX: 140.6 CM/SEC
BH CV ECHO MEAS - AO V2 MEAN: 82.4 CM/SEC
BH CV ECHO MEAS - AO V2 VTI: 20.3 CM
BH CV ECHO MEAS - AVA(I,A): 2.5 CM^2
BH CV ECHO MEAS - AVA(I,D): 2.5 CM^2
BH CV ECHO MEAS - AVA(V,A): 2 CM^2
BH CV ECHO MEAS - AVA(V,D): 2 CM^2
BH CV ECHO MEAS - BSA(HAYCOCK): 1.9 M^2
BH CV ECHO MEAS - BSA: 1.9 M^2
BH CV ECHO MEAS - BZI_BMI: 22.3 KILOGRAMS/M^2
BH CV ECHO MEAS - BZI_METRIC_HEIGHT: 180.3 CM
BH CV ECHO MEAS - BZI_METRIC_WEIGHT: 72.6 KG
BH CV ECHO MEAS - EDV(CUBED): 74.6 ML
BH CV ECHO MEAS - EDV(TEICH): 79 ML
BH CV ECHO MEAS - EF(CUBED): 83.6 %
BH CV ECHO MEAS - EF(TEICH): 76.9 %
BH CV ECHO MEAS - ESV(CUBED): 12.3 ML
BH CV ECHO MEAS - ESV(TEICH): 18.2 ML
BH CV ECHO MEAS - FS: 45.2 %
BH CV ECHO MEAS - IVS/LVPW: 0.9
BH CV ECHO MEAS - IVSD: 0.81 CM
BH CV ECHO MEAS - LA DIMENSION: 2 CM
BH CV ECHO MEAS - LA/AO: 0.49
BH CV ECHO MEAS - LAT PEAK E' VEL: 5.7 CM/SEC
BH CV ECHO MEAS - LATERAL E/E' RATIO: 16.8
BH CV ECHO MEAS - LV MASS(C)D: 111.1 GRAMS
BH CV ECHO MEAS - LV MASS(C)DI: 57.9 GRAMS/M^2
BH CV ECHO MEAS - LV MAX PG: 2.5 MMHG
BH CV ECHO MEAS - LV MEAN PG: 1.1 MMHG
BH CV ECHO MEAS - LV V1 MAX: 79.5 CM/SEC
BH CV ECHO MEAS - LV V1 MEAN: 48.8 CM/SEC
BH CV ECHO MEAS - LV V1 VTI: 14.3 CM
BH CV ECHO MEAS - LVIDD: 4.2 CM
BH CV ECHO MEAS - LVIDS: 2.3 CM
BH CV ECHO MEAS - LVOT AREA (M): 3.5 CM^2
BH CV ECHO MEAS - LVOT AREA: 3.5 CM^2
BH CV ECHO MEAS - LVOT DIAM: 2.1 CM
BH CV ECHO MEAS - LVPWD: 0.9 CM
BH CV ECHO MEAS - MV A MAX VEL: 94.8 CM/SEC
BH CV ECHO MEAS - MV DEC SLOPE: 454.1 CM/SEC^2
BH CV ECHO MEAS - MV DEC TIME: 0.24 SEC
BH CV ECHO MEAS - MV E MAX VEL: 97.7 CM/SEC
BH CV ECHO MEAS - MV E/A: 1
BH CV ECHO MEAS - MV MAX PG: 5.1 MMHG
BH CV ECHO MEAS - MV MEAN PG: 3 MMHG
BH CV ECHO MEAS - MV P1/2T MAX VEL: 102.8 CM/SEC
BH CV ECHO MEAS - MV P1/2T: 66.3 MSEC
BH CV ECHO MEAS - MV V2 MAX: 112.7 CM/SEC
BH CV ECHO MEAS - MV V2 MEAN: 85.4 CM/SEC
BH CV ECHO MEAS - MV V2 VTI: 31.6 CM
BH CV ECHO MEAS - MVA P1/2T LCG: 2.1 CM^2
BH CV ECHO MEAS - MVA(P1/2T): 3.3 CM^2
BH CV ECHO MEAS - MVA(VTI): 1.6 CM^2
BH CV ECHO MEAS - PA ACC SLOPE: 590.8 CM/SEC^2
BH CV ECHO MEAS - PA ACC TIME: 0.1 SEC
BH CV ECHO MEAS - PA MAX PG: 3.7 MMHG
BH CV ECHO MEAS - PA PR(ACCEL): 34.6 MMHG
BH CV ECHO MEAS - PA V2 MAX: 95.6 CM/SEC
BH CV ECHO MEAS - PI END-D VEL: 128.1 CM/SEC
BH CV ECHO MEAS - RAP SYSTOLE: 3 MMHG
BH CV ECHO MEAS - RVSP: 23 MMHG
BH CV ECHO MEAS - SI(AO): 132.9 ML/M^2
BH CV ECHO MEAS - SI(CUBED): 32.5 ML/M^2
BH CV ECHO MEAS - SI(LVOT): 26.2 ML/M^2
BH CV ECHO MEAS - SI(TEICH): 31.7 ML/M^2
BH CV ECHO MEAS - SV(AO): 254.9 ML
BH CV ECHO MEAS - SV(CUBED): 62.3 ML
BH CV ECHO MEAS - SV(LVOT): 50.3 ML
BH CV ECHO MEAS - SV(TEICH): 60.8 ML
BH CV ECHO MEAS - TAPSE (>1.6): 1.5 CM2
BH CV ECHO MEAS - TR MAX PG: 20 MMHG
BH CV ECHO MEAS - TR MAX VEL: 224.8 CM/SEC
BH CV VAS BP LEFT ARM: NORMAL MMHG
BH CV XLRA - RV BASE: 2.9 CM
BH CV XLRA - RV LENGTH: 8.2 CM
BH CV XLRA - RV MID: 2.4 CM
BH CV XLRA - TDI S': 15.4 CM/SEC
BILIRUB UR QL STRIP: NEGATIVE
BUN BLD-MCNC: 23 MG/DL (ref 8–23)
BUN/CREAT SERPL: 19.3 (ref 7–25)
CALCIUM SPEC-SCNC: 9 MG/DL (ref 8.2–9.6)
CHLORIDE SERPL-SCNC: 102 MMOL/L (ref 98–107)
CHOLEST SERPL-MCNC: 133 MG/DL (ref 0–200)
CLARITY UR: CLEAR
CO2 SERPL-SCNC: 25 MMOL/L (ref 22–29)
COLOR UR: YELLOW
CREAT BLD-MCNC: 1.19 MG/DL (ref 0.76–1.27)
GFR SERPL CREATININE-BSD FRML MDRD: 57 ML/MIN/1.73
GLUCOSE BLD-MCNC: 86 MG/DL (ref 65–99)
GLUCOSE BLDC GLUCOMTR-MCNC: 102 MG/DL (ref 70–130)
GLUCOSE BLDC GLUCOMTR-MCNC: 89 MG/DL (ref 70–130)
GLUCOSE UR STRIP-MCNC: NEGATIVE MG/DL
HBA1C MFR BLD: 5.3 % (ref 4.8–5.6)
HDLC SERPL-MCNC: 57 MG/DL (ref 40–60)
HGB UR QL STRIP.AUTO: NEGATIVE
KETONES UR QL STRIP: NEGATIVE
LDLC SERPL CALC-MCNC: 67 MG/DL (ref 0–100)
LDLC/HDLC SERPL: 1.17 {RATIO}
LEFT ATRIUM VOLUME INDEX: 33 ML/M2
LEFT ATRIUM VOLUME: 64 CM3
LEUKOCYTE ESTERASE UR QL STRIP.AUTO: NEGATIVE
MAXIMAL PREDICTED HEART RATE: 128 BPM
NITRITE UR QL STRIP: NEGATIVE
PH UR STRIP.AUTO: 7 [PH] (ref 5–8)
POTASSIUM BLD-SCNC: 3.6 MMOL/L (ref 3.5–5.2)
PROT UR QL STRIP: NEGATIVE
SODIUM BLD-SCNC: 139 MMOL/L (ref 136–145)
SP GR UR STRIP: 1.02 (ref 1–1.03)
STRESS TARGET HR: 109 BPM
TRIGL SERPL-MCNC: 46 MG/DL (ref 0–150)
UROBILINOGEN UR QL STRIP: NORMAL
VLDLC SERPL-MCNC: 9.2 MG/DL

## 2019-12-10 PROCEDURE — 82962 GLUCOSE BLOOD TEST: CPT

## 2019-12-10 PROCEDURE — 99222 1ST HOSP IP/OBS MODERATE 55: CPT | Performed by: INTERNAL MEDICINE

## 2019-12-10 PROCEDURE — 97165 OT EVAL LOW COMPLEX 30 MIN: CPT

## 2019-12-10 PROCEDURE — 92611 MOTION FLUOROSCOPY/SWALLOW: CPT

## 2019-12-10 PROCEDURE — 80048 BASIC METABOLIC PNL TOTAL CA: CPT | Performed by: INTERNAL MEDICINE

## 2019-12-10 PROCEDURE — 93306 TTE W/DOPPLER COMPLETE: CPT

## 2019-12-10 PROCEDURE — 97163 PT EVAL HIGH COMPLEX 45 MIN: CPT

## 2019-12-10 PROCEDURE — 83036 HEMOGLOBIN GLYCOSYLATED A1C: CPT | Performed by: INTERNAL MEDICINE

## 2019-12-10 PROCEDURE — 74230 X-RAY XM SWLNG FUNCJ C+: CPT

## 2019-12-10 PROCEDURE — 63710000001 ASPIRIN 81 MG CHEWABLE TABLET: Performed by: INTERNAL MEDICINE

## 2019-12-10 PROCEDURE — 80061 LIPID PANEL: CPT | Performed by: INTERNAL MEDICINE

## 2019-12-10 PROCEDURE — 93306 TTE W/DOPPLER COMPLETE: CPT | Performed by: INTERNAL MEDICINE

## 2019-12-10 PROCEDURE — 97535 SELF CARE MNGMENT TRAINING: CPT

## 2019-12-10 PROCEDURE — 81003 URINALYSIS AUTO W/O SCOPE: CPT | Performed by: INTERNAL MEDICINE

## 2019-12-10 PROCEDURE — 70551 MRI BRAIN STEM W/O DYE: CPT

## 2019-12-10 PROCEDURE — 99232 SBSQ HOSP IP/OBS MODERATE 35: CPT | Performed by: FAMILY MEDICINE

## 2019-12-10 PROCEDURE — 99223 1ST HOSP IP/OBS HIGH 75: CPT | Performed by: PSYCHIATRY & NEUROLOGY

## 2019-12-10 PROCEDURE — 92523 SPEECH SOUND LANG COMPREHEN: CPT

## 2019-12-10 PROCEDURE — A9270 NON-COVERED ITEM OR SERVICE: HCPCS | Performed by: INTERNAL MEDICINE

## 2019-12-10 PROCEDURE — 25010000002 HEPARIN (PORCINE) PER 1000 UNITS: Performed by: FAMILY MEDICINE

## 2019-12-10 RX ORDER — SODIUM CHLORIDE 9 MG/ML
100 INJECTION, SOLUTION INTRAVENOUS CONTINUOUS
Status: ACTIVE | OUTPATIENT
Start: 2019-12-10 | End: 2019-12-11

## 2019-12-10 RX ORDER — HEPARIN SODIUM 5000 [USP'U]/ML
5000 INJECTION, SOLUTION INTRAVENOUS; SUBCUTANEOUS EVERY 8 HOURS SCHEDULED
Status: DISCONTINUED | OUTPATIENT
Start: 2019-12-10 | End: 2019-12-12

## 2019-12-10 RX ORDER — TAMSULOSIN HYDROCHLORIDE 0.4 MG/1
0.4 CAPSULE ORAL DAILY
Status: DISCONTINUED | OUTPATIENT
Start: 2019-12-10 | End: 2019-12-14 | Stop reason: HOSPADM

## 2019-12-10 RX ADMIN — ATORVASTATIN CALCIUM 80 MG: 40 TABLET, FILM COATED ORAL at 20:50

## 2019-12-10 RX ADMIN — BARIUM SULFATE 100 ML: 0.81 POWDER, FOR SUSPENSION ORAL at 14:50

## 2019-12-10 RX ADMIN — ACETAMINOPHEN 650 MG: 325 TABLET ORAL at 20:50

## 2019-12-10 RX ADMIN — HEPARIN SODIUM 5000 UNITS: 5000 INJECTION, SOLUTION INTRAVENOUS; SUBCUTANEOUS at 20:50

## 2019-12-10 RX ADMIN — HEPARIN SODIUM 5000 UNITS: 5000 INJECTION, SOLUTION INTRAVENOUS; SUBCUTANEOUS at 22:00

## 2019-12-10 RX ADMIN — SODIUM CHLORIDE, PRESERVATIVE FREE 10 ML: 5 INJECTION INTRAVENOUS at 20:51

## 2019-12-10 RX ADMIN — BARIUM SULFATE 20 ML: 400 PASTE ORAL at 14:50

## 2019-12-10 RX ADMIN — ASPIRIN 81 MG 81 MG: 81 TABLET ORAL at 08:16

## 2019-12-10 RX ADMIN — SODIUM CHLORIDE 100 ML/HR: 9 INJECTION, SOLUTION INTRAVENOUS at 17:53

## 2019-12-10 RX ADMIN — SODIUM CHLORIDE, PRESERVATIVE FREE 10 ML: 5 INJECTION INTRAVENOUS at 08:17

## 2019-12-10 RX ADMIN — BARIUM SULFATE 50 ML: 400 SUSPENSION ORAL at 14:45

## 2019-12-10 RX ADMIN — TAMSULOSIN HYDROCHLORIDE 0.4 MG: 0.4 CAPSULE ORAL at 17:53

## 2019-12-10 RX ADMIN — BARIUM SULFATE 10 ML: 400 SUSPENSION ORAL at 14:45

## 2019-12-10 NOTE — THERAPY EVALUATION
Patient Name: Tho Kohli  : 10/8/1927    MRN: [id number]                              Today's Date: 12/10/2019       Admit Date: 2019    Visit Dx:     ICD-10-CM ICD-9-CM   1. Dysarthria R47.1 784.51   2. Left-sided weakness R53.1 728.87   3. Impaired mobility and ADLs Z74.09 799.89     Patient Active Problem List   Diagnosis   • Stroke (CMS/[facility])   • Essential hypertension   • Dysarthria   • SALVADOR (acute kidney injury) (CMS/[facility])     Past Medical History:   Diagnosis Date   • OAB (overactive bladder)      History reviewed. No pertinent surgical history.  General Information     Row Name 12/10/19 0951          PT Evaluation Time/Intention    Document Type  evaluation  -AA     Mode of Treatment  physical therapy  -AA     Row Name 12/10/19 0951          General Information    Patient Profile Reviewed?  yes  -AA     Prior Level of Function  independent:;all household mobility;gait;transfer;bed mobility;ADL's  -AA     Existing Precautions/Restrictions  fall  -AA     Barriers to Rehab  none identified  -AA     Row Name 12/10/19 0951          Relationship/Environment    Lives With  facility resident  -AA     Row Name 12/10/19 0951          Resource/Environmental Concerns    Current Living Arrangements  independent/assisted living facility  -AA     Row Name 12/10/19 0951          Home Main Entrance    Number of Stairs, Main Entrance  none  -AA     Row Name 12/10/19 0951          Stairs Within Home, Primary    Number of Stairs, Within Home, Primary  none  -AA     Row Name 12/10/19 0951          Cognitive Assessment/Intervention- PT/OT    Orientation Status (Cognition)  oriented x 4  -AA     Row Name 12/10/19 0951          Safety Issues, Functional Mobility    Safety Issues Affecting Function (Mobility)  insight into deficits/self awareness;positioning of assistive device;safety precautions follow-through/compliance;sequencing abilities;safety precaution awareness  -AA     Impairments Affecting Function (Mobility)   balance;endurance/activity tolerance;strength  -AA       User Key  (r) = Recorded By, (t) = Taken By, (c) = Cosigned By    Initials Name Provider Type    AA Aneta Centeno, PT Physical Therapist        Mobility     Row Name 12/10/19 0951          Bed Mobility Assessment/Treatment    Bed Mobility Assessment/Treatment  sit-supine;rolling left;rolling right;scooting/bridging  -AA     Rolling Left [place] (Bed Mobility)  minimum assist (75% patient effort);verbal cues  -AA     Rolling Right [place] (Bed Mobility)  minimum assist (75% patient effort);verbal cues  -AA     Scooting/Bridging [place] (Bed Mobility)  moderate assist (50% patient effort);2 person assist;verbal cues  -AA     Supine-Sit [place] (Bed Mobility)  moderate assist (50% patient effort);verbal cues  -AA     Sit-Supine [place] (Bed Mobility)  moderate assist (50% patient effort);verbal cues  -AA     Assistive Device (Bed Mobility)  bed rails;draw sheet;head of bed elevated  -AA     Comment (Bed Mobility)  pt attempted to scoot in supine by bridging but unable to scoot  -AA     Row Name 12/10/19 0951          Transfer Assessment/Treatment    Comment (Transfers)  VC for push off BUE and transitioning to RW.  VC for posture.  VC for weight shifting and proper stepping during SPT  -AA     Row Name 12/10/19 0951          Bed-Chair Transfer    Bed-Chair [place] (Transfers)  moderate assist (50% patient effort);verbal cues  -AA     Assistive Device (Bed-Chair Transfers)  walker, front-wheeled  -AA     Row Name 12/10/19 0951          Sit-Stand Transfer    Sit-Stand [place] (Transfers)  moderate assist (50% patient effort);verbal cues  -AA     Assistive Device (Sit-Stand Transfers)  walker, front-wheeled  -AA     Row Name 12/10/19 0951          Gait/Stairs Assessment/Training    Gait/Stairs Assessment/Training  gait/ambulation independence;gait/ambulation assistive device;gait deviations;distance ambulated;gait pattern  -AA      [place] Level (Gait)  moderate assist (50% patient effort);verbal cues  -AA     Assistive Device (Gait)  walker, front-wheeled  -AA     Distance in Feet (Gait)  10  -AA     Pattern (Gait)  step-to  -AA     Deviations/Abnormal Patterns (Gait)  bilateral deviations;nori decreased;stride length decreased;festinating/shuffling;steppage;base of support, wide  -AA     Bilateral Gait Deviations  forward flexed posture  -AA     Comment (Gait/Stairs)  Pt ambulated with slow nori and decreased step length.  VC and tactile cues for weight shifting and step length.  Pt unsteady and leans to the left  -AA       User Key  (r) = Recorded By, (t) = Taken By, (c) = Cosigned By    Initials Name Provider Type    AA Aneta Centeno, PT Physical Therapist        Obj/Interventions     Row Name 12/10/19 0951          General ROM    GENERAL ROM COMMENTS  BLE WNL  -AA     Row Name 12/10/19 0951          MMT (Manual Muscle Testing)    General MMT Comments  BLE 4-/5  -AA     Row Name 12/10/19 0951          Static Sitting Balance    Level of [place] (Unsupported Sitting, Static Balance)  contact guard assist  -AA     Sitting Position (Unsupported Sitting, Static Balance)  sitting on edge of bed  -AA     Time Able to Maintain Position (Unsupported Sitting, Static Balance)  more than 5 minutes  -AA     Row Name 12/10/19 0951          Static Standing Balance    Level of [place] (Supported Standing, Static Balance)  minimal assist, 75% patient effort  -AA     Assistive Device Utilized (Supported Standing, Static Balance)  walker, rolling  -AA     Row Name 12/10/19 0951          Dynamic Standing Balance    Level of [place], Reaches Outside Midline (Standing, Dynamic Balance)  moderate assist, 50 to 74% patient effort  -AA     Assistive Device Utilized (Supported Standing, Dynamic Balance)  walker, rolling  -AA     Row Name 12/10/19 0951          Sensory Assessment/Intervention    Sensory General Assessment  no sensation  deficits identified  -AA       User Key  (r) = Recorded By, (t) = Taken By, (c) = Cosigned By    Initials Name Provider Type    Aneta Masters, PT Physical Therapist        Goals/Plan     Row Name 12/10/19 0951          Bed Mobility Goal 1 (PT)    Activity/Assistive Device (Bed Mobility Goal 1, PT)  bed mobility activities, all  -AA     [place] Level/Cues Needed (Bed Mobility Goal 1, PT)  contact guard assist  -AA     Time Frame (Bed Mobility Goal 1, PT)  10 days  -     Row Name 12/10/19 0951          Transfer Goal 1 (PT)    Activity/Assistive Device (Transfer Goal 1, PT)  sit-to-stand/stand-to-sit;bed-to-chair/chair-to-bed  -AA     [place] Level/Cues Needed (Transfer Goal 1, PT)  contact guard assist  -AA     Time Frame (Transfer Goal 1, PT)  10 days  -     Row Name 12/10/19 0951          Gait Training Goal 1 (PT)    Activity/Assistive Device (Gait Training Goal 1, PT)  gait (walking locomotion);assistive device use  -AA     [place] Level (Gait Training Goal 1, PT)  contact guard assist  -AA     Time Frame (Gait Training Goal 1, PT)  10 days  -       User Key  (r) = Recorded By, (t) = Taken By, (c) = Cosigned By    Initials Name Provider Type    Aneta Masters, PT Physical Therapist        Clinical Impression     Row Name 12/10/19 0951          Pain Assessment    Additional Documentation  Pain Scale: Numbers Pre/Post-Treatment (Group)  -     Row Name 12/10/19 0951          Pain Scale: Numbers Pre/Post-Treatment    Pain Scale: Numbers, Pretreatment  2/10  -     Pain Scale: Numbers, Post-Treatment  2/10  -     Pain Location - Side  Left  -     Pain Location - Orientation  generalized  -AA     Pain Location  extremity  -AA     Pain Intervention(s)  Repositioned;Ambulation/increased activity  -     Row Name 12/10/19 0951          Plan of Care Review    Plan of Care Reviewed With  patient  -     Progress  improving  -     Row Name 12/10/19 0951          Physical Therapy Clinical  Impression    Criteria for Skilled Interventions Met (PT Clinical Impression)  yes;treatment indicated  -AA     Rehab Potential (PT Clinical Summary)  good, to achieve stated therapy goals  -AA     Predicted Duration of Therapy (PT)  10 days  -     Row Name 12/10/19 0951          Positioning and Restraints    Pre-Treatment Position  sitting in chair/recliner  -AA     Post Treatment Position  bed  -AA     In Bed  notified nsg;supine;with other staff;call light within reach;encouraged to call for assist;exit alarm on  -AA       User Key  (r) = Recorded By, (t) = Taken By, (c) = Cosigned By    Initials Name Provider Type    Aneta Masters, PT Physical Therapist        Outcome Measures     Row Name 12/10/19 0951          How much help from another person do you currently need...    Turning from your back to your side while in flat bed without using bedrails?  2  -AA     Moving from lying on back to sitting on the side of a flat bed without bedrails?  2  -AA     Moving to and from a bed to a chair (including a wheelchair)?  2  -AA     Standing up from a chair using your arms (e.g., wheelchair, bedside chair)?  2  -AA     Climbing 3-5 steps with a railing?  1  -AA     To walk in hospital room?  2  -AA     AM-PAC 6 Clicks Score (PT)  11  -     Row Name 12/10/19 0951          Modified [place] Scale    Pre-Stroke Modified [place] Scale  1 - No significant disability despite symptoms.  Able to carry out all usual duties and activities.  -AA     Modified [place] Scale  4 - Moderately severe disability.  Unable to walk without assistance, and unable to attend to own bodily needs without assistance.  -     Row Name 12/10/19 0951          Functional Assessment    Outcome Measure Options  AM-PAC 6 Clicks Basic Mobility (PT);Modified [place]  -AA       User Key  (r) = Recorded By, (t) = Taken By, (c) = Cosigned By    Initials Name Provider Type    Aneta Masters, PT Physical Therapist        Physical Therapy Education                  Title: PT OT SLP Therapies (In Progress)     Topic: Physical Therapy (In Progress)     Point: Mobility training (In Progress)     Description:   Instruct learner(s) on safety and technique for assisting patient out of bed, chair or wheelchair.  Instruct in the proper use of assistive devices, such as walker, crutches, cane or brace.              Patient Friendly Description:   It's important to get you on your feet again, but we need to do so in a way that is safe for you. Falling has serious consequences, and your personal safety is the most important thing of all.        When it's time to get out of bed, one of us or a family member will sit next to you on the bed to give you support.     If your doctor or nurse tells you to use a walker, crutches, a cane, or a brace, be sure you use it every time you get out of bed, even if you think you don't need it.    Learning Progress Summary           Patient Acceptance, E,D, NR by AA at 12/10/2019 0951                   Point: Body mechanics (In Progress)     Description:   Instruct learner(s) on proper positioning and spine alignment for patient and/or caregiver during mobility tasks and/or exercises.              Learning Progress Summary           Patient Acceptance, E,D, NR by AA at 12/10/2019 0951                   Point: Precautions (In Progress)     Description:   Instruct learner(s) on prescribed precautions during mobility and gait tasks              Learning Progress Summary           Patient Acceptance, E,D, NR by AA at 12/10/2019 0951                               User Key     Initials Effective Dates Name Provider Type Discipline     04/02/18 -  Jacobo April L, PT Physical Therapist PT              PT Recommendation and Plan  Planned Therapy Interventions (PT Eval): balance training, bed mobility training, gait training, home exercise program, patient/family education, strengthening, transfer training  Outcome Summary/Treatment Plan  (PT)  Anticipated Discharge Disposition (PT): inpatient rehabilitation facility  Plan of Care Reviewed With: patient  Progress: improving  Outcome Summary: PT eval complete.  Pt required mod A for transfers and gait fro 10 feet all with RW.  Balance deficits noted increasing fall risk.  Pt fatigue quickly.  Motivated to participate and return to [facility].  Recommend DC to IRF when appropriate     Time Calculation:   PT Charges     Row Name 12/10/19 0951             Time Calculation    Start Time  0951  -AA      PT Received On  12/10/19  -AA      PT Goal Re-Cert Due Date  12/20/19 -AA        User Key  (r) = Recorded By, (t) = Taken By, (c) = Cosigned By    Initials Name Provider Type    Aneta Masters, PT Physical Therapist        Therapy Charges for Today     Code Description Service Date Service Provider Modifiers Qty    [id number] HC PT EVAL HIGH COMPLEXITY 4 12/10/2019 Aneta Centeno, PT GP 1          PT G-Codes  Outcome Measure Options: AM-PAC 6 Clicks Basic Mobility (PT), Modified [place]  AM-PAC 6 Clicks Score (PT): 11  AM-PAC 6 Clicks Score (OT): 14  Modified [place] Scale: 4 - Moderately severe disability.  Unable to walk without assistance, and unable to attend to own bodily needs without assistance.    Aneta Centeno PT  12/10/2019

## 2019-12-10 NOTE — CONSULTS
"Neurology    Referring provider:   Faby Valerio,   [id number]  [place] [address]    Reason for Consultation: Stroke    Chief complaint: Left-sided weakness and slurred speech    History of present illness: 89-year-old man seen for Dr. Hall for evaluation of stroke.    He woke up yesterday morning and on going to the bathroom and lost his footing and fell.  He was aware of weakness in his left leg.    He was subsequently noted to have left facial weakness, slurred speech, and some mild weakness in his left arm.    He is never had a stroke in the past.    He denies palpitations.    He does not smoke.    He did have a subdural hematoma dozen years ago treated by Dr. Amos surgically.    General health is good and he still works half time running a business.    Lives in assisted living    Review of Systems: All systems reviewed and other than the HPI are negative    Home meds:   Medications Prior to Admission   Medication Sig Dispense Refill Last Dose   • hydroCHLOROthiazide (HYDRODIURIL) 25 MG tablet Take 25 mg by mouth Daily.      • losartan (COZAAR) 25 MG tablet Take 25 mg by mouth Daily.          History  Past Medical History:   Diagnosis Date   • OAB (overactive bladder)    , History reviewed. No pertinent surgical history., History reviewed. No pertinent family history.,   Social History     Tobacco Use   • Smoking status: Never Smoker   • Smokeless tobacco: Never Used   Substance Use Topics   • Alcohol use: Not on file   • Drug use: Never    and Allergies:  Patient has no known allergies.,    Vital Signs   Blood pressure 157/97, pulse 67, temperature 97.6 °F (36.4 °C), temperature source Oral, resp. rate 18, height 180.3 cm (70.98\"), weight 72.6 kg (160 lb), SpO2 96 %.  Body mass index is 22.33 kg/m².    Physical Exam:   General: Pleasant white male in no distress              Head: No signs of trauma              Neck: No bruits              Resp: Normal breath sounds         "      Cor: Regular rhythm              Extremities: No edema              Skin: Dry              Neuro: Mentally alert and oriented with normal memory attention and concentration.    Speech is a significantly dysarthric with no word finding problems or difficulty understanding.    Coordination is clumsy on finger-nose testing on the left.    Cranial nerves show benign fundi equal pupils full eye movements.  He has a left central facial weakness and sensation are normal.    Palate elevates normally tongue protrudes normally.    Reflexes are 2+ and equal bilaterally.    Motor testing shows mild drift of his left hand with mild decreased  on that side.  He has mild weakness in his left leg as well.    Muscle tone is normal.    Sensory testing is normal bilaterally including double simultaneous stimulation.            Results Review: MRI was personally reviewed and shows a small right centrum semiovale acute infarct and a tiny left hemisphere centrum semiovale infarct as well.  In addition he has diffuse ischemic white matter change.    CT perfusion was unremarkable.    CT angiogram of the head and neck shows no significant stenosis.    Carotid duplex is benign as well.    Hemoglobin A1c is 5.3.    LDL cholesterol is 67.        Labs:  Lab Results (last 72 hours)     Procedure Component Value Units Date/Time    Hemoglobin A1c [[id number]]  (Normal) Collected:  12/10/19 0513    Specimen:  Blood Updated:  12/10/19 0825     Hemoglobin A1C 5.30 %     Narrative:       Hemoglobin A1C Ranges:    Increased Risk for Diabetes  5.7% to 6.4%  Diabetes                     >= 6.5%  Diabetic Goal                < 7.0%    Lipid Panel [[id number]] Collected:  12/10/19 0513    Specimen:  Blood Updated:  12/10/19 0646     Total Cholesterol 133 mg/dL      Triglycerides 46 mg/dL      HDL Cholesterol 57 mg/dL      LDL Cholesterol  67 mg/dL      VLDL Cholesterol 9.2 mg/dL      LDL/HDL Ratio 1.17    Narrative:       Cholesterol Reference  Ranges  (U.S. Department of Health and Human Services ATP III Classifications)    Desirable          <200 mg/dL  Borderline High    200-239 mg/dL  High Risk          >240 mg/dL      Triglyceride Reference Ranges  (U.S. Department of Health and Human Services ATP III Classifications)    Normal           <150 mg/dL  Borderline High  150-199 mg/dL  High             200-499 mg/dL  Very High        >500 mg/dL    HDL Reference Ranges  (U.S. Department of Health and Human Services ATP III Classifcations)    Low     <40 mg/dl (major risk factor for CHD)  High    >60 mg/dl ('negative' risk factor for CHD)        LDL Reference Ranges  (U.S. Department of Health and Human Services ATP III Classifcations)    Optimal          <100 mg/dL  Near Optimal     100-129 mg/dL  Borderline High  130-159 mg/dL  High             160-189 mg/dL  Very High        >189 mg/dL    Basic Metabolic Panel [[id number]]  (Abnormal) Collected:  12/10/19 0513    Specimen:  Blood Updated:  12/10/19 0646     Glucose 86 mg/dL      BUN 23 mg/dL      Creatinine 1.19 mg/dL      Sodium 139 mmol/L      Potassium 3.6 mmol/L      Chloride 102 mmol/L      CO2 25.0 mmol/L      Calcium 9.0 mg/dL      eGFR Non African Amer 57 mL/min/1.73      BUN/Creatinine Ratio 19.3     Anion Gap 12.0 mmol/L     Narrative:       GFR Normal >60  Chronic Kidney Disease <60  Kidney Failure <15      POC Glucose Once [[id number]]  (Normal) Collected:  12/10/19 0553    Specimen:  Blood Updated:  12/10/19 0554     Glucose 89 mg/dL     Urinalysis With Microscopic If Indicated (No Culture) - Urine, Catheter In/Out [[id number]]  (Normal) Collected:  12/10/19 0131    Specimen:  Urine, Catheter In/Out Updated:  12/10/19 0157     Color, UA Yellow     Appearance, UA Clear     pH, UA 7.0     Specific Gravity, UA 1.022     Glucose, UA Negative     Ketones, UA Negative     Bilirubin, UA Negative     Blood, UA Negative     Protein, UA Negative     Leuk Esterase, UA Negative     Nitrite, UA Negative      Urobilinogen, UA 0.2 E.U./dL    Narrative:       Urine microscopic not indicated.    POC Glucose Once [[id number]]  (Normal) Collected:  12/09/19 2353    Specimen:  Blood Updated:  12/10/19 0005     Glucose 102 mg/dL     POC Glucose Once [[id number]]  (Abnormal) Collected:  12/09/19 1949    Specimen:  Blood Updated:  12/09/19 1951     Glucose 173 mg/dL     Troponin [[id number]]  (Abnormal) Collected:  12/09/19 1419    Specimen:  Blood Updated:  12/09/19 1730     Troponin T 0.093 ng/mL     Narrative:       Troponin T Reference Range:  <= 0.03 ng/mL-   Negative for AMI  >0.03 ng/mL-     Abnormal for myocardial necrosis.  Clinicians would have to utilize clinical acumen, EKG, Troponin and serial changes to determine if it is an Acute Myocardial Infarction or myocardial injury due to an underlying chronic condition.     POC Glucose Once [[id number]]  (Normal) Collected:  12/09/19 1449    Specimen:  Blood Updated:  12/09/19 1503     Glucose 100 mg/dL     [place] Draw [[id number]] Collected:  12/09/19 1136    Specimen:  Blood Updated:  12/09/19 1333    Narrative:       The following orders were created for panel order [place] Draw.  Procedure                               Abnormality         Status                     ---------                               -----------         ------                     Light Blue Top[[id number]]                                   Final result               Green Top (Gel)[[id number]]                                  Final result               Lavender Top[[id number]]                                     Final result               Gold Top - SST[[id number]]                                   Final result               Green Top (No Gel)[[id number]]                                                            Please view results for these tests on the individual orders.    Light Blue Top [[id number]] Collected:  12/09/19 1136    Specimen:  Blood Updated:  12/09/19 1249     Extra Tube hold for add-on      Comment: Auto resulted       Green Top (Gel) [[id number]] Collected:  12/09/19 1136    Specimen:  Blood Updated:  12/09/19 1249     Extra Tube Hold for add-ons.     Comment: Auto resulted.       Lavender Top [[id number]] Collected:  12/09/19 1136    Specimen:  Blood Updated:  12/09/19 1249     Extra Tube hold for add-on     Comment: Auto resulted       Gold Top - SST [[id number]] Collected:  12/09/19 1136    Specimen:  Blood Updated:  12/09/19 1249     Extra Tube Hold for add-ons.     Comment: Auto resulted.       AST [[id number]]  (Normal) Collected:  12/09/19 1136    Specimen:  Blood Updated:  12/09/19 1224     AST (SGOT) 21 U/L     ALT [[id number]]  (Normal) Collected:  12/09/19 1136    Specimen:  Blood Updated:  12/09/19 1224     ALT (SGPT) 16 U/L     Troponin [[id number]]  (Abnormal) Collected:  12/09/19 1136    Specimen:  Blood Updated:  12/09/19 1222     Troponin T 0.055 ng/mL     Narrative:       Troponin T Reference Range:  <= 0.03 ng/mL-   Negative for AMI  >0.03 ng/mL-     Abnormal for myocardial necrosis.  Clinicians would have to utilize clinical acumen, EKG, Troponin and serial changes to determine if it is an Acute Myocardial Infarction or myocardial injury due to an underlying chronic condition.     aPTT [[id number]]  (Normal) Collected:  12/09/19 1136    Specimen:  Blood Updated:  12/09/19 1216     PTT 29.7 seconds     Narrative:       PTT = The equivalent PTT values for the therapeutic range of heparin levels at 0.3 to 0.5 U/ml are 55 to 70 seconds.    CBC & Differential [[id number]] Collected:  12/09/19 1136    Specimen:  Blood Updated:  12/09/19 1201    Narrative:       The following orders were created for panel order CBC & Differential.  Procedure                               Abnormality         Status                     ---------                               -----------         ------                     CBC Auto Differential[[id number]]        Abnormal            Final result                 Please  view results for these tests on the individual orders.    CBC Auto Differential [[id number]]  (Abnormal) Collected:  12/09/19 1136    Specimen:  Blood Updated:  12/09/19 1201     WBC 8.83 10*3/mm3      RBC 4.00 10*6/mm3      Hemoglobin 12.9 g/dL      Hematocrit 39.6 %      MCV 99.0 fL      MCH 32.3 pg      MCHC 32.6 g/dL      RDW 13.3 %      RDW-SD 48.4 fl      MPV 10.3 fL      Platelets 197 10*3/mm3      Neutrophil % 80.5 %      Lymphocyte % 7.7 %      Monocyte % 10.1 %      Eosinophil % 0.8 %      Basophil % 0.3 %      Immature Grans % 0.6 %      Neutrophils, Absolute 7.11 10*3/mm3      Lymphocytes, Absolute 0.68 10*3/mm3      Monocytes, Absolute 0.89 10*3/mm3      Eosinophils, Absolute 0.07 10*3/mm3      Basophils, Absolute 0.03 10*3/mm3      Immature Grans, Absolute 0.05 10*3/mm3      nRBC 0.0 /100 WBC     POC CHEM 8 [[id number]]  (Abnormal) Collected:  12/09/19 1131    Specimen:  Blood Updated:  12/09/19 1148     Glucose 100 mg/dL      BUN 27 mg/dL      Creatinine 1.60 mg/dL      Sodium 140 mmol/L      Potassium 3.7 mmol/L      Chloride 99 mmol/L      Total CO2 30 mmol/L      Hemoglobin 13.3 g/dL      Comment: Serial Number: [id number]:  [id number]        Hematocrit 39 %      Ionized Calcium 1.11 mmol/L     POC Protime / INR [[id number]]  (Abnormal) Collected:  12/09/19 1130    Specimen:  Blood Updated:  12/09/19 1148     Protime 12.4 seconds      INR 1.0     Comment: Serial Number: [id number]:  [id number]             Rads:  Imaging Results (Last 72 Hours)     Procedure Component Value Units Date/Time    MRI Brain Without Contrast [[id number]] Collected:  12/10/19 0829     Updated:  12/10/19 1125    Narrative:       EXAMINATION: MRI BRAIN WO CONTRAST- 12/10/2019     INDICATION: Stroke; R47.1-Dysarthria and anarthria; R53.1-Weakness     TECHNIQUE: Sagittal and axial images of brain are displayed. No  intravenous contrast was utilized.     COMPARISON: NONE     FINDINGS: In addition to extensive age-related change,  there are small  punctate areas of abnormal signal lateral to each lateral ventricle as  well as in the right thalamic region and right lentiform nucleus. There  are associated areas of restricted diffusion consistent with small  bilateral embolic infarcts.       Impression:       There are small bilateral acute infarcts presumably embolic  in nature. Otherwise there are age-related findings.     D:  12/10/2019  E:  12/10/2019         This report was finalized on 12/10/2019 11:22 AM by Dr. Humberto Ramos MD.       XR Chest 1 View [[id number]] Collected:  12/09/19 1205     Updated:  12/10/19 0834    Narrative:       EXAMINATION: XR CHEST 1 VW-12/09/2019:      INDICATION: Acute Stroke Protocol (onset < 12 hrs).      COMPARISON: Chest radiograph 09/30/2007.     FINDINGS: Single portable chest radiograph was submitted for review. The  heart is not particularly enlarged. There is what appears to be a very  large 16 cm transverse measured hiatal hernia with an air-fluid level.  Chronic changes in the lungs are identified without convincing evidence  for active airspace disease. No pleural effusion or pneumothorax. The  visualized upper abdomen is unrevealing. Diffuse osteopenia suggested.            Impression:       1.  What appears to be a large hiatal hernia measuring up to 16 cm in  transverse diameter with an air-fluid level. Consider follow-up CT  imaging of the chest for further evaluation if this is not clinically  known.  2.  Chronic changes in the lungs identified without evidence of active  airspace disease.     D:  12/09/2019  E:  12/09/2019     This report was finalized on 12/10/2019 8:31 AM by Dr. Nilson Robins MD.       CT Cerebral Perfusion With & Without Contrast [[id number]] Collected:  12/09/19 1152     Updated:  12/10/19 0833    Narrative:       EXAMINATION: CT CEREBRAL PERFUSION W WO CONTRAST- 12/09/2019     INDICATION: TIA, initial screening     TECHNIQUE: Cerebral perfusion analysis was performed  using computed  tomography with contrast administration, including post processing of  parametric maps with determination of cerebral blood flow, cerebral  blood volume, and mean transit time.     The radiation dose reduction device was turned on for each scan per the  ALARA (As Low as Reasonably Achievable) protocol.     COMPARISON: CT of the head 12/09/2019     FINDINGS: Dedicated perfusion imaging of the brain demonstrates no focal  increased mean transit time abnormality within a vascular distribution  to suggest large territorial reversible infarcts. Rapid analysis  demonstrates focal increased mismatch volume within the posterior  circulation of the occipital lobes bilaterally with an additional  increased TMAX abnormality within the right parietal lobe measuring 11  mm greater than 6 seconds. There is some misregistration artifact along  the left cerebral convexity at the site of prior surgery/dural  thickening.       Impression:       No convincing perfusion abnormality within a vascular  territory to suggest large ischemic defect. There is focal increased  TMAX abnormality within the right parietal and multifocal regions within  the occipital lobe which could be artifactual/slow flow related however  small vessel infarcts are not excluded. Consider follow-up MRI imaging  of brain which would provide a more detailed evaluation.     D:  12/09/2019  E:  12/09/2019     This report was finalized on 12/10/2019 8:30 AM by Dr. Nilson Robisn MD.       CT Angiogram Head [[id number]] Collected:  12/09/19 1202     Updated:  12/10/19 0833    Narrative:       EXAMINATION: CT ANGIOGRAM HEAD-, CT ANGIOGRAM NECK- 12/09/2019     INDICATION: Stroke     TECHNIQUE: Unenhanced CT imaging of the head and neck was performed  followed by dedicated CTA imaging of the head and neck vasculature with  additional multiplanar 3-D MIP and VRT reconstructed imaging.     Stenosis measurement was performed by the NASCET or similar  method.     The radiation dose reduction device was turned on for each scan per the  ALARA (As Low as Reasonably Achievable) protocol.     COMPARISON: CT of the head 12/09/2019     FINDINGS:      UNENHANCED: The visualized lung apex demonstrates an accessory azygos  fissure. Fibronodular scarring is noted. Identified with the left lung  apex is a 1.5 x 0.8 cm nodule which may relate to scarring, although  follow-up as clinically indicated. No hyperdensity identified within the  carotid arteries to suggest intramural hematoma.     CTA IMAGING: Dedicated CTA imaging demonstrates no convincing evidence  of filling defect within the pulmonary arterial vasculature as  visualized. The ascending aorta does not demonstrate acute abnormality.  The ascending aorta measures up to 3.7 cm in maximum dimension. The  bilateral subclavian arteries appear patent. The bilateral common  carotid arteries are tortuous and demonstrates scattered atherosclerotic  calcifications without significant narrowing identified. The carotid  bifurcations are patent with only minimal calcified atherosclerotic  disease. The external carotid systems appear patent bilaterally. The  right common carotid artery is patent without evidence of occlusion or  significant stenosis. The cavernous and clinoid and supraclinoid  portions appear intact. The left common carotid artery appears patent  without significant stenosis. There is focal dilation measuring up to  1.1 cm involving the proximal cervical portion of the left internal  carotid artery. The remainder of the left internal carotid artery  appears patent without significant occlusion or stenosis identified. The  clinoid and supraclinoid portions appear patent. The right middle  cerebral artery appears patent without significant narrowing or  occlusion identified. The left middle cerebral artery appears patent  without convincing evidence for significant occlusion or narrowing. The  anterior cerebral  arteries appear patent without significant stenosis or  aneurysm identified. Persistent right fetal PCA anatomy is identified.  No significant stenosis of the right posterior cerebral artery  identified. The left posterior cerebral artery appears patent. The  basilar artery appears patent. Left dominant vertebral artery system is  identified although the vertebral arteries appear patent throughout  their course in the intervertebral foramen and into the upper chest.       Impression:       1. CTA evaluation of the head and neck demonstrates no convincing  evidence for large territorial vessel occlusion or stenosis.     2. There is focal prominence of the cervical left internal carotid  artery measuring up to 1.1 cm.     3. Incidentally identified 1.5 cm left upper lobe pulmonary nodule which  could relate to scarring although follow-up is clinically indicated when  the patient is able.     D:  12/09/2019  E:  12/09/2019     This report was finalized on 12/10/2019 8:30 AM by Dr. Nilson Robins MD.       CT Angiogram Neck [[id number]] Collected:  12/09/19 1202     Updated:  12/10/19 0833    Narrative:       EXAMINATION: CT ANGIOGRAM HEAD-, CT ANGIOGRAM NECK- 12/09/2019     INDICATION: Stroke     TECHNIQUE: Unenhanced CT imaging of the head and neck was performed  followed by dedicated CTA imaging of the head and neck vasculature with  additional multiplanar 3-D MIP and VRT reconstructed imaging.     Stenosis measurement was performed by the NASCET or similar method.     The radiation dose reduction device was turned on for each scan per the  ALARA (As Low as Reasonably Achievable) protocol.     COMPARISON: CT of the head 12/09/2019     FINDINGS:      UNENHANCED: The visualized lung apex demonstrates an accessory azygos  fissure. Fibronodular scarring is noted. Identified with the left lung  apex is a 1.5 x 0.8 cm nodule which may relate to scarring, although  follow-up as clinically indicated. No hyperdensity  identified within the  carotid arteries to suggest intramural hematoma.     CTA IMAGING: Dedicated CTA imaging demonstrates no convincing evidence  of filling defect within the pulmonary arterial vasculature as  visualized. The ascending aorta does not demonstrate acute abnormality.  The ascending aorta measures up to 3.7 cm in maximum dimension. The  bilateral subclavian arteries appear patent. The bilateral common  carotid arteries are tortuous and demonstrates scattered atherosclerotic  calcifications without significant narrowing identified. The carotid  bifurcations are patent with only minimal calcified atherosclerotic  disease. The external carotid systems appear patent bilaterally. The  right common carotid artery is patent without evidence of occlusion or  significant stenosis. The cavernous and clinoid and supraclinoid  portions appear intact. The left common carotid artery appears patent  without significant stenosis. There is focal dilation measuring up to  1.1 cm involving the proximal cervical portion of the left internal  carotid artery. The remainder of the left internal carotid artery  appears patent without significant occlusion or stenosis identified. The  clinoid and supraclinoid portions appear patent. The right middle  cerebral artery appears patent without significant narrowing or  occlusion identified. The left middle cerebral artery appears patent  without convincing evidence for significant occlusion or narrowing. The  anterior cerebral arteries appear patent without significant stenosis or  aneurysm identified. Persistent right fetal PCA anatomy is identified.  No significant stenosis of the right posterior cerebral artery  identified. The left posterior cerebral artery appears patent. The  basilar artery appears patent. Left dominant vertebral artery system is  identified although the vertebral arteries appear patent throughout  their course in the intervertebral foramen and into the  upper chest.       Impression:       1. CTA evaluation of the head and neck demonstrates no convincing  evidence for large territorial vessel occlusion or stenosis.     2. There is focal prominence of the cervical left internal carotid  artery measuring up to 1.1 cm.     3. Incidentally identified 1.5 cm left upper lobe pulmonary nodule which  could relate to scarring although follow-up is clinically indicated when  the patient is able.     D:  12/09/2019  E:  12/09/2019     This report was finalized on 12/10/2019 8:30 AM by Dr. Nilson Robins MD.       CT Head Without Contrast Stroke Protocol [[id number]] Collected:  12/09/19 1137     Updated:  12/10/19 0833    Narrative:       EXAMINATION: CT HEAD WO CONTRAST STROKE PROTOCOL- 12/09/2019     INDICATION: Stroke     TECHNIQUE: Unenhanced CT imaging of the head was performed during a code  stroke protocol     The radiation dose reduction device was turned on for each scan per the  ALARA (As Low as Reasonably Achievable) protocol.     COMPARISON: CT of the head 12/04/2007     FINDINGS: Unenhanced CT imaging of the brain demonstrates no convincing  evidence for midline shift or mass effect. No evidence of hydrocephalus.  Diffuse cerebral atrophy is identified. There is no convincing evidence  for extra-axial fluid collection. Identified within the left lateral  extra-axial cerebral convexity is a hyperdense linear region adjacent to  a prior craniotomy defect consistent with thickened dura which is  significantly improved from 12/04/2007. There is no convincing evidence  of extra-axial hemorrhage within this region. The gray-white matter  junction demonstrate multifocal regions of low attenuation throughout  the periventricular and subcortical white matter most pronounced  involving the right parietal lobe. This could relate to chronic  microvascular ischemic change however small infarcts cannot be entirely  excluded particularly within the right parietal region. The  skull  appears intact with the exception of the prior left craniotomy defect  without evidence for depressed skull fracture. Paranasal sinuses appear  clear. Incidental note of cavum septum pellucidum, an anatomic variant.       Impression:       1.  No convincing evidence for acute intracranial pathology identified.  There is linear hyperattenuation along the left cerebral convexity  adjacent to the prior craniotomy defect favored to represent residual  thickened dura rather than hemorrhage. Consider follow-up imaging on MRI  which may provide additional evaluation.     2. Multifocal confluent hypoattenuation within the white matter as  described above most pronounced involving the right parietal lobe. Small  infarct/ischemia would be difficult to entirely exclude. Attention on  follow-up more advanced imaging advised.     These findings were discussed with Dr. Velazquez at exam time 11:16 AM on  12/09/2019.     D:  12/09/2019  E:  12/09/2019        This report was finalized on 12/10/2019 8:30 AM by Dr. Nilson Robins MD.               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 fib.    Since the patient indicates that he has not had significant falling he would be a candidate for full anticoagulation if that proves to be the case.    I discussed the patients findings and my recommendations with patient, nursing staff, primary care team and consulting provider      Joey Javier MD  12/10/19  2:02 PM

## 2019-12-10 NOTE — PLAN OF CARE
Problem: Patient Care Overview  Goal: Plan of Care Review  Outcome: Ongoing (interventions implemented as appropriate)  Flowsheets (Taken 12/10/2019 6589)  Outcome Summary: PT eval complete.  Pt required mod A for transfers and gait fro 10 feet all with RW.  Balance deficits noted increasing fall risk.  Pt fatigue quickly.  Motivated to participate and return to assisted.  Recommend DC to IRF when appropriate

## 2019-12-10 NOTE — NURSING NOTE
Pt had requested to keep urinal between legs in case pt had to pee. As of midnight he had not peed. He requested a catheter. At this time staff bladder scanned pt and scan showed 850 ml. Straight catheterization performed and 1875 ml drained from bladder. Spoke to BILL Centeno and updated her and sent UA to lab

## 2019-12-10 NOTE — PLAN OF CARE
Alert and oriented x 4. When laying flat pt has difficulty swallowing even his secretions.   Experienced urinary retention. Order is to bladder scan and straight cath every 6 hours. Pt states he has an over active bladder but symptoms he explains are that of chronic urinary retention. Interventions this shift as previously noted

## 2019-12-10 NOTE — PROGRESS NOTES
Discharge Planning Assessment  [facility]     Patient Name: Tho Kohli  MRN: [id number]  Today's Date: 12/10/2019    Admit Date: 12/9/2019    Discharge Needs Assessment     Row Name 12/10/19 1413       Living Environment    Lives With  facility resident Lives at [facility] for last 4 months    Unique Family Situation  Milagro Guardado (daughter) [phone]    Current Living Arrangements  home/apartment/condo;independent/assisted living facility    Primary Care Provided by  self    Provides Primary Care For  no one    Family Caregiver if Needed  other (see comments);child(gladis), adult Assisted living staff    Family Caregiver Names  Milagro Guardado (daughter) [phone]    Quality of Family Relationships  helpful;involved;supportive    Able to Return to Prior Arrangements  yes    Living Arrangement Comments  Patient lives at [facility] living [facility]       Resource/Environmental Concerns    Resource/Environmental Concerns  none       Transition Planning    Patient/Family Anticipates Transition to  inpatient rehabilitation facility;other (see comments) Assisted living versus inpatient rehab at transfer    Patient/Family Anticipated Services at Transition      Transportation Anticipated  car, drives self;family or friend will provide Patient uses shuttle services at [facility].  Anticipate family will transport home or to next level of care       Discharge Needs Assessment    Readmission Within the Last 30 Days  no previous admission in last 30 days    Concerns to be Addressed  discharge planning    Equipment Currently Used at Home  walker, rolling;cane, straight    Anticipated Changes Related to Illness  none    Equipment Needed After Discharge  none    Outpatient/Agency/Support Group Needs  inpatient rehabilitation facility    Discharge Facility/Level of Care Needs  rehabilitation facility    Provided post acute provider list?  Yes    Post Acute Provider Lists  Inpatient Rehab    Post  Acuite Provider List  Delivered    Delivered To  Patient    Method of Delivery  In person    Patient's Choice of Community Agency(s)  [facility]        Discharge Plan     Row Name 12/10/19 1416       Plan    Plan  Return to Assisted Living versus Inpatient rehab    Patient/Family in Agreement with Plan  yes    Plan Comments  Spoke with patient at bedside.  Patient lives at [facility] in [place].  He states that he is independent at facility and is able to ambulate with minimal assist from cane or rolling walker.  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  Patient states that he does not have any other DME.  Patient is currently receiving PT 2 times a week provided by assisted living facilty.  Patient states that his PCP is Daniel Adams MD and insurance provider is Medicare A and B and AAR.  He states that he has prescription drug coverage and is able to afford his medications.  Patient plans to transfer back to [facility], but states that if rehab is recommended he would like to try [facility]. Will await PT and OT evaluations before disposition determination.  Patient states that family will transport at discharge.  Will continue to follow for discharge planning.     Final Discharge Disposition Code  62 - inpatient rehab facility        Destination      Coordination has not been started for this encounter.      Durable Medical Equipment      Coordination has not been started for this encounter.      Dialysis/Infusion      Coordination has not been started for this encounter.      Home Medical Care      Coordination has not been started for this encounter.      Therapy      Coordination has not been started for this encounter.      Community Resources      Coordination has not been started for this encounter.        Expected Discharge Date and Time     Expected Discharge Date Expected Discharge Time    Dec 13, 2019         Demographic Summary     Row Name  12/10/19 1412       General Information    Admission Type  inpatient    Arrived From  home    Referral Source  admission list    Reason for Consult  discharge planning    Preferred Language  English     Used During This Interaction  no    General Information Comments  PCP:  Daniel Adams MD confirmed with patient        Functional Status     Row Name 12/10/19 1412       Functional Status    Usual Activity Tolerance  good    Current Activity Tolerance  moderate       Functional Status, IADL    Medications  independent    Meal Preparation  assistive person    Housekeeping  assistive person    Laundry  assistive person    Shopping  assistive person        Psychosocial    No documentation.       Abuse/Neglect    No documentation.       Legal    No documentation.       Substance Abuse    No documentation.       Patient Forms    No documentation.           Elvira Pearson RN

## 2019-12-10 NOTE — PROGRESS NOTES
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  MRI confirms multiple bilateral acute infarcts.    12/10 -patient is going for modified barium swallow today at 1 PM.  He is able to communicate and feels weak overall.  Reviewed imaging with the patient and his son including CT scans which were initially negative.  His MRI confirms multiple bilateral acute infarcts.  His work-up is in progress.  His echocardiogram shows EF of 61 to 65% with left ventricular diastolic dysfunction grade 2.  There is no history of atrial fibrillation.  On exam I noted his right leg looks a little bit larger than his left leg he is able to move both legs.  I will get an ultrasound of the legs to rule out DVT.  His carotid duplex show bilateral carotid artery plaque without significant stenosis with vertebral arterial flow antegrade bilaterally.  He was not taking aspirin prior to the stroke.  He is on aspirin 81 mg and Lipitor 80 mg.  Neurology consult is pending.    Review of Systems  General: No fever, chills, positive fatigue  ENT: No sore throat, positive trouble swallowing or changes in vision  Respiratory: No shortness of breath, cough, wheezing or fast breathing  Cardiovascular: No chest pain, palpitations, dyspnea with exertion  Gastrointestinal: No nausea vomiting abdominal pain  Musculoskeletal: Positive difficulty walking, positive weakness  Vascular: No cyanosis or clubbing  Lymphatic: No peripheral edema, no lymphadenopathy  Neurologic: No headache, confusion, dizziness; positive weakness, dysarthria and dysphasia  Psychiatric: No changes in mood.  No depression or anxiety.       Objective   Objective     Vital Signs:   Temp:   [97.6 °F (36.4 °C)-98.6 °F (37 °C)] 97.6 °F (36.4 °C)  Heart Rate:  [65-89] 67  Resp:  [18-20] 18  BP: (122-177)/() 157/97  Total (NIH Stroke Scale): 4     Physical Exam:  Constitutional: No acute distress, awake, alert, nontoxic, thin body habitus  Eyes: Pupils equal, sclerae anicteric, no conjunctival injection  HENT: NCAT, mucous membranes moist  Neck: Supple, no thyromegaly, no lymphadenopathy  Respiratory: Clear to auscultation bilaterally, good effort, nonlabored respirations   Cardiovascular: RRR  Gastrointestinal: Positive bowel sounds, soft, nontender, nondistended  Musculoskeletal: No peripheral edema, normal muscle tone for age  Psychiatric: Appropriate affect, good insight and judgement, cooperative  Neurologic: Oriented x 3, movements symmetric BUE and BLE, Cranial Nerves grossly intact to confrontation, speech clear and fluent  Skin: No rashes, no jaundice, no petechiae, no mottling    Results Reviewed:    Results from last 7 days   Lab Units 12/09/19  1136 12/09/19  1131 12/09/19  1130   WBC 10*3/mm3 8.83  --   --    HEMOGLOBIN g/dL 12.9*  --   --    HEMOGLOBIN, POC g/dL  --  13.3  --    HEMATOCRIT % 39.6  --   --    HEMATOCRIT POC %  --  39  --    PLATELETS 10*3/mm3 197  --   --    INR   --   --  1.0     Results from last 7 days   Lab Units 12/10/19  0513 12/09/19  1419 12/09/19  1136 12/09/19  1131   SODIUM mmol/L 139  --   --   --    POTASSIUM mmol/L 3.6  --   --   --    CHLORIDE mmol/L 102  --   --   --    CO2 mmol/L 25.0  --   --   --    BUN mg/dL 23  --   --   --    CREATININE mg/dL 1.19  --   --  1.60*   GLUCOSE mg/dL 86  --   --   --    CALCIUM mg/dL 9.0  --   --   --    ALT (SGPT) U/L  --   --  16  --    AST (SGOT) U/L  --   --  21  --    TROPONIN T ng/mL  --  0.093* 0.055*  --      Estimated Creatinine Clearance: 40.7 mL/min (by C-G formula based on SCr of 1.19 mg/dL).    Microbiology Results Abnormal     None          Imaging Results (Last 24 Hours)     Procedure Component Value Units  Date/Time    MRI Brain Without Contrast [[id number]] Collected:  12/10/19 0829     Updated:  12/10/19 1125    Narrative:       EXAMINATION: MRI BRAIN WO CONTRAST- 12/10/2019     INDICATION: Stroke; R47.1-Dysarthria and anarthria; R53.1-Weakness     TECHNIQUE: Sagittal and axial images of brain are displayed. No  intravenous contrast was utilized.     COMPARISON: NONE     FINDINGS: In addition to extensive age-related change, there are small  punctate areas of abnormal signal lateral to each lateral ventricle as  well as in the right thalamic region and right lentiform nucleus. There  are associated areas of restricted diffusion consistent with small  bilateral embolic infarcts.       Impression:       There are small bilateral acute infarcts presumably embolic  in nature. Otherwise there are age-related findings.     D:  12/10/2019  E:  12/10/2019         This report was finalized on 12/10/2019 11:22 AM by Dr. Humberto Ramos MD.       XR Chest 1 View [[id number]] Collected:  12/09/19 1205     Updated:  12/10/19 0834    Narrative:       EXAMINATION: XR CHEST 1 VW-12/09/2019:      INDICATION: Acute Stroke Protocol (onset < 12 hrs).      COMPARISON: Chest radiograph 09/30/2007.     FINDINGS: Single portable chest radiograph was submitted for review. The  heart is not particularly enlarged. There is what appears to be a very  large 16 cm transverse measured hiatal hernia with an air-fluid level.  Chronic changes in the lungs are identified without convincing evidence  for active airspace disease. No pleural effusion or pneumothorax. The  visualized upper abdomen is unrevealing. Diffuse osteopenia suggested.            Impression:       1.  What appears to be a large hiatal hernia measuring up to 16 cm in  transverse diameter with an air-fluid level. Consider follow-up CT  imaging of the chest for further evaluation if this is not clinically  known.  2.  Chronic changes in the lungs identified without evidence of  active  airspace disease.     D:  12/09/2019  E:  12/09/2019     This report was finalized on 12/10/2019 8:31 AM by Dr. Nilson Robins MD.       CT Cerebral Perfusion With & Without Contrast [[id number]] Collected:  12/09/19 1152     Updated:  12/10/19 0833    Narrative:       EXAMINATION: CT CEREBRAL PERFUSION W WO CONTRAST- 12/09/2019     INDICATION: TIA, initial screening     TECHNIQUE: Cerebral perfusion analysis was performed using computed  tomography with contrast administration, including post processing of  parametric maps with determination of cerebral blood flow, cerebral  blood volume, and mean transit time.     The radiation dose reduction device was turned on for each scan per the  ALARA (As Low as Reasonably Achievable) protocol.     COMPARISON: CT of the head 12/09/2019     FINDINGS: Dedicated perfusion imaging of the brain demonstrates no focal  increased mean transit time abnormality within a vascular distribution  to suggest large territorial reversible infarcts. Rapid analysis  demonstrates focal increased mismatch volume within the posterior  circulation of the occipital lobes bilaterally with an additional  increased TMAX abnormality within the right parietal lobe measuring 11  mm greater than 6 seconds. There is some misregistration artifact along  the left cerebral convexity at the site of prior surgery/dural  thickening.       Impression:       No convincing perfusion abnormality within a vascular  territory to suggest large ischemic defect. There is focal increased  TMAX abnormality within the right parietal and multifocal regions within  the occipital lobe which could be artifactual/slow flow related however  small vessel infarcts are not excluded. Consider follow-up MRI imaging  of brain which would provide a more detailed evaluation.     D:  12/09/2019  E:  12/09/2019     This report was finalized on 12/10/2019 8:30 AM by Dr. Nilson Robins MD.       CT Angiogram Head [[id number]]  Collected:  12/09/19 1202     Updated:  12/10/19 0833    Narrative:       EXAMINATION: CT ANGIOGRAM HEAD-, CT ANGIOGRAM NECK- 12/09/2019     INDICATION: Stroke     TECHNIQUE: Unenhanced CT imaging of the head and neck was performed  followed by dedicated CTA imaging of the head and neck vasculature with  additional multiplanar 3-D MIP and VRT reconstructed imaging.     Stenosis measurement was performed by the NASCET or similar method.     The radiation dose reduction device was turned on for each scan per the  ALARA (As Low as Reasonably Achievable) protocol.     COMPARISON: CT of the head 12/09/2019     FINDINGS:      UNENHANCED: The visualized lung apex demonstrates an accessory azygos  fissure. Fibronodular scarring is noted. Identified with the left lung  apex is a 1.5 x 0.8 cm nodule which may relate to scarring, although  follow-up as clinically indicated. No hyperdensity identified within the  carotid arteries to suggest intramural hematoma.     CTA IMAGING: Dedicated CTA imaging demonstrates no convincing evidence  of filling defect within the pulmonary arterial vasculature as  visualized. The ascending aorta does not demonstrate acute abnormality.  The ascending aorta measures up to 3.7 cm in maximum dimension. The  bilateral subclavian arteries appear patent. The bilateral common  carotid arteries are tortuous and demonstrates scattered atherosclerotic  calcifications without significant narrowing identified. The carotid  bifurcations are patent with only minimal calcified atherosclerotic  disease. The external carotid systems appear patent bilaterally. The  right common carotid artery is patent without evidence of occlusion or  significant stenosis. The cavernous and clinoid and supraclinoid  portions appear intact. The left common carotid artery appears patent  without significant stenosis. There is focal dilation measuring up to  1.1 cm involving the proximal cervical portion of the left  internal  carotid artery. The remainder of the left internal carotid artery  appears patent without significant occlusion or stenosis identified. The  clinoid and supraclinoid portions appear patent. The right middle  cerebral artery appears patent without significant narrowing or  occlusion identified. The left middle cerebral artery appears patent  without convincing evidence for significant occlusion or narrowing. The  anterior cerebral arteries appear patent without significant stenosis or  aneurysm identified. Persistent right fetal PCA anatomy is identified.  No significant stenosis of the right posterior cerebral artery  identified. The left posterior cerebral artery appears patent. The  basilar artery appears patent. Left dominant vertebral artery system is  identified although the vertebral arteries appear patent throughout  their course in the intervertebral foramen and into the upper chest.       Impression:       1. CTA evaluation of the head and neck demonstrates no convincing  evidence for large territorial vessel occlusion or stenosis.     2. There is focal prominence of the cervical left internal carotid  artery measuring up to 1.1 cm.     3. Incidentally identified 1.5 cm left upper lobe pulmonary nodule which  could relate to scarring although follow-up is clinically indicated when  the patient is able.     D:  12/09/2019  E:  12/09/2019     This report was finalized on 12/10/2019 8:30 AM by Dr. Nilson Robins MD.       CT Angiogram Neck [[id number]] Collected:  12/09/19 1202     Updated:  12/10/19 0833    Narrative:       EXAMINATION: CT ANGIOGRAM HEAD-, CT ANGIOGRAM NECK- 12/09/2019     INDICATION: Stroke     TECHNIQUE: Unenhanced CT imaging of the head and neck was performed  followed by dedicated CTA imaging of the head and neck vasculature with  additional multiplanar 3-D MIP and VRT reconstructed imaging.     Stenosis measurement was performed by the NASCET or similar method.     The  radiation dose reduction device was turned on for each scan per the  ALARA (As Low as Reasonably Achievable) protocol.     COMPARISON: CT of the head 12/09/2019     FINDINGS:      UNENHANCED: The visualized lung apex demonstrates an accessory azygos  fissure. Fibronodular scarring is noted. Identified with the left lung  apex is a 1.5 x 0.8 cm nodule which may relate to scarring, although  follow-up as clinically indicated. No hyperdensity identified within the  carotid arteries to suggest intramural hematoma.     CTA IMAGING: Dedicated CTA imaging demonstrates no convincing evidence  of filling defect within the pulmonary arterial vasculature as  visualized. The ascending aorta does not demonstrate acute abnormality.  The ascending aorta measures up to 3.7 cm in maximum dimension. The  bilateral subclavian arteries appear patent. The bilateral common  carotid arteries are tortuous and demonstrates scattered atherosclerotic  calcifications without significant narrowing identified. The carotid  bifurcations are patent with only minimal calcified atherosclerotic  disease. The external carotid systems appear patent bilaterally. The  right common carotid artery is patent without evidence of occlusion or  significant stenosis. The cavernous and clinoid and supraclinoid  portions appear intact. The left common carotid artery appears patent  without significant stenosis. There is focal dilation measuring up to  1.1 cm involving the proximal cervical portion of the left internal  carotid artery. The remainder of the left internal carotid artery  appears patent without significant occlusion or stenosis identified. The  clinoid and supraclinoid portions appear patent. The right middle  cerebral artery appears patent without significant narrowing or  occlusion identified. The left middle cerebral artery appears patent  without convincing evidence for significant occlusion or narrowing. The  anterior cerebral arteries appear  patent without significant stenosis or  aneurysm identified. Persistent right fetal PCA anatomy is identified.  No significant stenosis of the right posterior cerebral artery  identified. The left posterior cerebral artery appears patent. The  basilar artery appears patent. Left dominant vertebral artery system is  identified although the vertebral arteries appear patent throughout  their course in the intervertebral foramen and into the upper chest.       Impression:       1. CTA evaluation of the head and neck demonstrates no convincing  evidence for large territorial vessel occlusion or stenosis.     2. There is focal prominence of the cervical left internal carotid  artery measuring up to 1.1 cm.     3. Incidentally identified 1.5 cm left upper lobe pulmonary nodule which  could relate to scarring although follow-up is clinically indicated when  the patient is able.     D:  12/09/2019  E:  12/09/2019     This report was finalized on 12/10/2019 8:30 AM by Dr. Nilson Robins MD.       CT Head Without Contrast Stroke Protocol [[id number]] Collected:  12/09/19 1137     Updated:  12/10/19 0833    Narrative:       EXAMINATION: CT HEAD WO CONTRAST STROKE PROTOCOL- 12/09/2019     INDICATION: Stroke     TECHNIQUE: Unenhanced CT imaging of the head was performed during a code  stroke protocol     The radiation dose reduction device was turned on for each scan per the  ALARA (As Low as Reasonably Achievable) protocol.     COMPARISON: CT of the head 12/04/2007     FINDINGS: Unenhanced CT imaging of the brain demonstrates no convincing  evidence for midline shift or mass effect. No evidence of hydrocephalus.  Diffuse cerebral atrophy is identified. There is no convincing evidence  for extra-axial fluid collection. Identified within the left lateral  extra-axial cerebral convexity is a hyperdense linear region adjacent to  a prior craniotomy defect consistent with thickened dura which is  significantly improved from  12/04/2007. There is no convincing evidence  of extra-axial hemorrhage within this region. The gray-white matter  junction demonstrate multifocal regions of low attenuation throughout  the periventricular and subcortical white matter most pronounced  involving the right parietal lobe. This could relate to chronic  microvascular ischemic change however small infarcts cannot be entirely  excluded particularly within the right parietal region. The skull  appears intact with the exception of the prior left craniotomy defect  without evidence for depressed skull fracture. Paranasal sinuses appear  clear. Incidental note of cavum septum pellucidum, an anatomic variant.       Impression:       1.  No convincing evidence for acute intracranial pathology identified.  There is linear hyperattenuation along the left cerebral convexity  adjacent to the prior craniotomy defect favored to represent residual  thickened dura rather than hemorrhage. Consider follow-up imaging on MRI  which may provide additional evaluation.     2. Multifocal confluent hypoattenuation within the white matter as  described above most pronounced involving the right parietal lobe. Small  infarct/ischemia would be difficult to entirely exclude. Attention on  follow-up more advanced imaging advised.     These findings were discussed with Dr. Velazquez at exam time 11:16 AM on  12/09/2019.     D:  12/09/2019  E:  12/09/2019        This report was finalized on 12/10/2019 8:30 AM by Dr. Nilson Robins MD.             Results for orders placed during the hospital encounter of 12/09/19   Adult Transthoracic Echo Complete W/ Cont if Necessary Per Protocol (With Agitated Saline)    Narrative · Left ventricular systolic function is normal.  · Estimated EF appears to be in the range of 61 - 65%.  · Left ventricular diastolic dysfunction (grade II) consistent with   pseudonormalization.          I have reviewed the medications:  Scheduled Meds:  aspirin 81 mg Oral  Daily   Or      aspirin 300 mg Rectal Daily   atorvastatin 80 mg Oral Nightly   sodium chloride 10 mL Intravenous Q12H   tamsulosin 0.4 mg Oral Daily     Continuous Infusions:  sodium chloride 100 mL/hr     PRN Meds:.•  acetaminophen **OR** acetaminophen **OR** acetaminophen  •  ondansetron  •  sodium chloride  •  sodium chloride      Assessment/Plan   Assessment / Plan     Active Hospital Problems    Diagnosis  POA   • **Stroke (CMS/[facility]) [I63.9]  Unknown   • Essential hypertension [I10]  Unknown   • Dysarthria [R47.1]  Yes   • SALVADOR (acute kidney injury) (CMS/[facility]) [N17.9]  Unknown      Resolved Hospital Problems   No resolved problems to display.        Brief Hospital Course to date:  Tho Kohli is a 89 y.o. male admitted with acute bilateral cerebral infarcts.    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  -Continue aspirin 81 mg, Lipitor 80 mg  -Neurology consulted  -Cardiology consulted    Right lower extremity swelling  -Duplex ultrasound bilaterally to rule out DVT    Grade 2 diastolic dysfunction    Elevated troponin     Dysarthria    Acute urinary retention  -Started Flomax  -In and out cath per protocol as needed    DVT Prophylaxis: Heparin    CODE STATUS:   Code Status and Medical Interventions:   Ordered at: 12/09/19 1402     Limited Support to NOT Include:    Antiarrhythmic Drugs    Intubation     Level Of Support Discussed With:    Patient     Code Status:    No CPR     Medical Interventions (Level of Support Prior to Arrest):    Limited         Electronically signed by Siobhan Hall MD, 12/10/19, 12:40 PM.

## 2019-12-10 NOTE — MBS/VFSS/FEES
Acute Care - Speech Language Pathology   Swallow Initial Evaluation  [place]   Modified Barium Swallow Study (MBS)  Cognitive-Communication Evaluation     Patient Name: Tho Kohli  : 10/8/1927  MRN: [id number]  Today's Date: 12/10/2019               Admit Date: 2019    Visit Dx:     ICD-10-CM ICD-9-CM   1. Dysarthria R47.1 784.51   2. Left-sided weakness R53.1 728.87   3. Impaired mobility and ADLs Z74.09 799.89   4. Oropharyngeal dysphagia R13.12 787.22   5. 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)   • Essential hypertension   • Dysarthria   • SALVADOR (acute kidney injury) (CMS/[facility])   • Grade III diastolic dysfunction   • Elevated troponin   • Abnormal EKG     Past Medical History:   Diagnosis Date   • OAB (overactive bladder)      History reviewed. No pertinent surgical history.     SWALLOW EVALUATION (last 72 hours)      SLP Adult Swallow Evaluation     Row Name 12/10/19 1420                Rehab Evaluation    Document Type  evaluation  -RD       Subjective Information  no complaints  -RD       Patient Observations  alert;cooperative;agree to therapy  -RD       Patient/Family Observations  No family present  -RD       Patient Effort  good  -RD          General Information    Patient Profile Reviewed  yes  -RD       Pertinent History Of Current Problem  Adm w/ Bilateral CVAs-embolic. Per stroke protocol. Failed RN dys screen. HTN, DH 2007, SALVADOR, OAB. Lives @ assisted living facility, but independent w/ ADLs.   -RD       Current Method of Nutrition  mechanical soft, no mixed consistencies;nectar/syrup-thick liquids  -RD       Precautions/Limitations, Vision  corrective lenses needed for reading;vision impairment, bilaterally  -RD       Precautions/Limitations, Hearing  WFL;for purposes of eval  -RD       Prior Level of Function-Communication  WFL  -RD       Prior Level of Function-Swallowing  no diet consistency restrictions  -RD        Plans/Goals Discussed with  patient;agreed upon  -RD       Barriers to Rehab  none identified  -RD       Patient's Goals for Discharge  eat/drink without coughing/choking  -RD          Pain Assessment    Additional Documentation  Pain Scale: Numbers Pre/Post-Treatment (Group)  -RD          Pain Scale: Numbers Pre/Post-Treatment    Pain Scale: Numbers, Pretreatment  0/10 - no pain  -RD       Pain Scale: Numbers, Post-Treatment  0/10 - no pain  -RD          Pain Scale: FACES Pre/Post-Treatment    Pain: FACES Scale, Pretreatment  --       Pain: FACES Scale, Post-Treatment  --          Oral Motor and Function    Dentition Assessment  --       Secretion Management  --       Mucosal Quality  --          Oral Musculature and Cranial Nerve Assessment    Oral Motor General Assessment  --       Oral Labial or Buccal Impairment, Detail, Cranial Nerve VII (Facial):  --       Oral Motor, Comment  --          General Eating/Swallowing Observations    Eating/Swallowing Skills  --       Positioning During Eating  --       Utensils Used  --       Consistencies Trialed  --          Clinical Swallow Eval    Oral Prep Phase  --       Oral Transit  --       Oral Residue  --       Pharyngeal Phase  --       Clinical Swallow Evaluation Summary  --          Oral Prep Concerns    Oral Prep Concerns  --       Prolonged Mastication  --          Pharyngeal Phase Concerns    Pharyngeal Phase Concerns  --       Cough  --          MBS/VFSS    Utensils Used  spoon;cup;straw  -RD       Consistencies Trialed  thin liquids;nectar/syrup-thick liquids;honey-thick liquids;pudding thick;regular textures  -RD          MBS/VFSS Interpretation    Oral Prep Phase  impaired oral phase of swallowing  -RD       Oral Transit Phase  impaired  -RD       Oral Residue  impaired  -RD          Oral Preparatory Phase    Oral Preparatory Phase  anterior loss;prolonged manipulation  -RD       Anterior Loss  all consistencies tested;secondary to reduced labial  seal;other (see comments) somewhat at baseline 2' hx of lingual resection per pt  -RD       Prolonged Manipulation  pudding/puree;regular textures;secondary to reduced lingual strength;secondary to reduced lingual range of motion  -RD          Oral Transit Phase    Impaired Oral Transit Phase  increased A-P transit time;premature spillage of liquids into pharynx  -RD       Increased A-P Transit Time  pudding/puree;regular textures;secondary to reduced lingual control  -RD       Premature Spillage of Liquids into Pharynx  thin liquids;nectar-thick liquids;honey-thick liquids;secondary to reduced lingual control  -RD          Oral Residue    Impaired Oral Residue  lingual residue;lateral sulci residue;diffuse residue throughout oral cavity  -RD       Lingual Residue  all consistencies tested;secondary to reduced lingual strength;secondary to reduced lingual range of motion  -RD       Lateral Sulci Residue  regular textures;secondary to reduced lingual strength;secondary to reduced lingual range of motion;other (see comments) able to mostly clear w/ cued lingual sweep- L lateral sulci  -RD       Diffuse Residue throughout Oral Cavity  all consistencies tested;secondary to reduced lingual strength;secondary to reduced lingual range of motion  -RD       Response to Oral Residue  with compensatory maneuver (see comments);other (see comments) mostly cleared w/ cued lingual sweep + re-swallow  -RD       Oral Residue, Comment  moderate oral residue (> w/ solids in L lateral sulci- mostly cleared w/ cued lingual sweep + re-swallow). Was losing residue anteriorly on L after the swallow. Best recs for Level 3-pureed w/ some mashed + L lingual sweep.   -RD          Initiation of Pharyngeal Swallow    Initiation of Pharyngeal Swallow  bolus in pyriform sinuses  -RD       Pharyngeal Phase  impaired pharyngeal phase of swallowing  -RD       Penetration During the Swallow  thin liquids;nectar-thick liquids;honey-thick  liquids;pudding/puree;secondary to delayed swallow initiation or mistiming;secondary to reduced vestibular closure  -RD       Aspiration During the Swallow  thin liquids;nectar-thick liquids;honey-thick liquids;secondary to delayed swallow initiation or mistiming;secondary to reduced vestibular closure  -RD       Penetration After the Swallow  thin liquids;nectar-thick liquids;secondary to residue;in valleculae  -RD       Aspiration After the Swallow  thin liquids;nectar-thick liquids;honey-thick liquids;secondary to residue;in laryngeal vestibule  -RD       Response to Penetration  deep;no response  -RD       Response to Aspiration  no response, silent aspiration;response with cue only;could not clear subglottic material  -RD       Rosenbek's Scale  thin:;nectar:;honey:;8--->level 8  -RD       Pharyngeal Residue  all consistencies tested;base of tongue;valleculae;pyriform sinuses;posterior pharyngeal wall;laryngeal vestibule;diffuse within pharynx;secondary to reduced base of tongue retraction;secondary to reduced posterior pharyngeal wall stripping;other (see comments) reduced PES opening impacting  -RD       Response to Residue  cleared residue with compensatory maneuver (see comments);other (see comments) mostly  -RD       Attempted Compensatory Maneuvers  bolus size;bolus presentation style;chin tuck;multiple swallows;throat clear after swallow  -RD       Response to Attempted Compensatory Maneuvers  prevented aspiration;reduced residue  -RD       Pharyngeal Phase, Comment  Moderate-severe pharyngeal dysphagia. Penetration/aspiration during w/ continued aspiration of vestibular residue upon consecutive swallows w/ thins, nectar, and honey-thick liquids. Aspiration was silent. Pt was unable to clear aspiration even w/ cued cough. Penetration occurred x1 w/ pudding. Pre-spill+ delayed initiation + poor vestibular closure contributing to penetration/aspiration. Moderate-severe diffuse pharyngeal residue (> in  valleculae and pooling to the pyriform sinuses) w/ all consistencies 2' reduced base of tongue retraction and decr'd pharyngeal stripping (suspect prominent inward curvature of cervical spine somewhat impacting). Multiple compensations attempted. Pt able to prevent aspiration and significantly reduce residue w/ small bites/single sips of honey-thick liquids via tsp/cup and w/ pudding/solids. Chin tuck attempted w/ nectar-thick, but still had deep penetration posing higher aspiration risk given mistiming. Pt would benefit from modified diet w/ compensations + oropharyngeal strengthening during treatment. Pt agreeable to recs. Pt able to teach back compensations w/ min cues x1, but given new memory deficits, will likely need supervision to ensure use of compensations during meals at this time.   -RD          Esophageal Phase    Esophageal Phase  esophageal retention with retrograde flow through PES  -RD       Esophageal Phase, Comment  Prominent cricopharyngeus and reduced PES opening impacting. Pt would benefit from reflux precautions  -RD          Clinical Impression    SLP Swallowing Diagnosis  moderate;oral dysfunction;mod-severe;pharyngeal dysfunction  -RD       Functional Impact  risk of aspiration/pneumonia  -RD       Rehab Potential/Prognosis, Swallowing  good, to achieve stated therapy goals  -RD       Swallow Criteria for Skilled Therapeutic Interventions Met  demonstrates skilled criteria  -RD          Recommendations    Therapy Frequency (Swallow)  5 days per week  -RD       Predicted Duration Therapy Intervention (Days)  until discharge  -RD       SLP Diet Recommendation  puree with some mashed;honey thick liquids;other (see comments) supervision w/ meals to ensure compensations  -RD       Recommended Diagnostics  --       Recommended Precautions and Strategies  chin Tuck;upright posture during/after eating;small bites of food and sips of liquid;no straw;check mouth frequently for oral  residue/pocketing;other (see comments) aspiration/reflux precautions; oral care; L lingual sweep  -RD       SLP Rec. for Method of Medication Administration  meds crushed;with pudding or applesauce + chin tuck  -RD       Monitor for Signs of Aspiration  yes;notify SLP if any concerns  -RD       Anticipated Dischage Disposition  inpatient rehabilitation facility;anticipate therapy at next level of care  -RD         User Key  (r) = Recorded By, (t) = Taken By, (c) = Cosigned By    Initials Name Effective Dates    RD Winnie Durbin, MS CCC-SLP 04/03/18 -     MP Stephan Michelle, MS CCC-SLP 06/19/19 -           EDUCATION  The patient has been educated in the following areas:   Dysphagia (Swallowing Impairment) Oral Care/Hydration Modified Diet Instruction.    SLP Recommendation and Plan  SLP Swallowing Diagnosis: moderate, oral dysfunction, mod-severe, pharyngeal dysfunction  SLP Diet Recommendation: puree with some mashed, honey thick liquids, other (see comments)(supervision w/ meals to ensure compensations)  Recommended Precautions and Strategies: chin Tuck, upright posture during/after eating, small bites of food and sips of liquid, no straw, check mouth frequently for oral residue/pocketing, other (see comments)(aspiration/reflux precautions; oral care; L lingual sweep)  SLP Rec. for Method of Medication Administration: meds crushed, with pudding or applesauce(+ chin tuck)     Monitor for Signs of Aspiration: yes, notify SLP if any concerns     Swallow Criteria for Skilled Therapeutic Interventions Met: demonstrates skilled criteria  Anticipated Dischage Disposition: inpatient rehabilitation facility, anticipate therapy at next level of care  Rehab Potential/Prognosis, Swallowing: good, to achieve stated therapy goals  Therapy Frequency (Swallow): 5 days per week  Predicted Duration Therapy Intervention (Days): until discharge       Plan of Care Reviewed With: patient    SLP GOALS     Row Name 12/10/19 4751              Oral Nutrition/Hydration Goal 1 (SLP)    Oral Nutrition/Hydration Goal 1, SLP  LTG: Pt will return to regular diet and thin liquids w/ 100% accuracy w/o cues  -RD      Time Frame (Oral Nutrition/Hydration Goal 1, SLP)  by discharge  -RD         Oral Nutrition/Hydration Goal 2 (SLP)    Oral Nutrition/Hydration Goal 2, SLP  Pt will tolerate level 3-pureed w/ some mashed and honey-thick liquids w/ compensations w/ no overt s/s of aspiration w/ 100% accuracy w/o cues  -RD      Time Frame (Oral Nutrition/Hydration Goal 2, SLP)  short term goal (STG);by discharge  -RD         Lingual Strengthening Goal 1 (SLP)    Activity (Lingual Strengthening Goal 1, SLP)  increase lingual tone/sensation/control/coordination/movement;increase tongue back strength  -RD      Increase Lingual Tone/Sensation/Control/Coordination/Movement  lingual movement exercises  -RD      Increase Tongue Back Strength  lingual resistance exercises  -RD      [place]/Accuracy (Lingual Strengthening Goal 1, SLP)  with minimal cues (75-90% accuracy)  -RD      Time Frame (Lingual Strengthening Goal 1, SLP)  short term goal (STG);by discharge  -RD         Pharyngeal Strengthening Exercise Goal 1 (SLP)    Activity (Pharyngeal Strengthening Goal 1, SLP)  increase timing;increase closure at entrance to airway/closure of airway at glottis;increase squeeze/positive pressure generation;increase tongue base retraction;increase PES opening  -RD      Increase Timing  prepping - 3 second prep or suck swallow or 3-step swallow  -RD      Increase Closure at Entrance to Airway/Closure of Airway at Glottis  super-supraglottic swallow  -RD      Increase Squeeze/Positive Pressure Generation  effortful pitch glide (falsetto + pharyngeal squeeze)  -RD      Increase Tongue Base Retraction  hard effortful swallow;williams  -RD      Increase PES Opening  chin tuck against resistance (CTAR)  -RD      [place]/Accuracy (Pharyngeal Strengthening Goal 1, SLP)  with  minimal cues (75-90% accuracy)  -RD      Time Frame (Pharyngeal Strengthening Goal 1, SLP)  short term goal (STG);by discharge  -RD         Swallow Management Recall Goal 1 (SLP)    Activity (Swallow Management Recall Goal 1, SLP)  recall of;safe diet level/texture;safe liquid viscosity;compensatory swallow strategies/techniques;postural techniques;rationale for use of strategies/techniques  -RD      [place]/Accuracy (Swallow Management Recall Goal 1, SLP)  with minimal cues (75-90% accuracy)  -RD      Time Frame (Swallow Management Recall Goal 1, SLP)  short term goal (STG);by discharge  -RD         Swallow Compensatory Strategies Goal 1 (SLP)    Activity (Swallow Compensatory Strategies/Techniques Goal 1, SLP)  compensatory strategies;postural techniques;during meal intake;small bites;small cup sips;chin tuck posture;other (see comments) L lingual sweep w/ solids  -RD      [place]/Accuracy (Swallow Compensatory Strategies/Techniques Goal 1, SLP)  independently (over 90% accuracy)  -RD      Time Frame (Swallow Compensatory Strategies/Techniques Goal 1, SLP)  short term goal (STG);by discharge  -RD         Phonation Goal 1 (SLP)    Improve Phonation By Goal 1 (SLP)  using loud speech;80%;with minimal cues (75-90%)  -RD      Time Frame (Phonation Goal 1, SLP)  short term goal (STG);by discharge  -RD         Reduce Perception of Hypernasality Goal 1 (SLP)    Reduce Perception of Hypernasality By Goal 1 (SLP)  opening mouth wider for speech;80%;with minimal cues (75-90%)  -RD      Time Frame (Perception of Hypernasality Goal 1, SLP)  short term goal (STG);by discharge  -RD         Articulation Goal 1 (SLP)    Improve Articulation Goal 1 (SLP)  of specific sounds in connected speech;by over-articulating in connected speech;80%;with minimal cues (75-90%)  -RD      Time Frame (Articulation Goal 1, SLP)  short term goal (STG);by discharge  -RD         Memory Skills Goal 1 (SLP)    Improve Memory Skills Through  Goal 1 (SLP)  recalling unrelated word lists with an imposed delay;80%;with minimal cues (75-90%)  -RD      Time Frame (Memory Skills Goal 1, SLP)  short term goal (STG);by discharge  -RD         Executive Functional Skills Goal 1 (SLP)    Improve Executive Function Skills Goal 1 (SLP)  demonstrate awareness of deficit;identify strategies, strengths, limitations;identify anticipated needs;80%;with minimal cues (75-90%)  -RD      Time Frame (Executive Function Skills Goal 1, SLP)  short term goal (STG);by discharge  -RD         Additional Goal 1 (SLP)    Additional Goal 1, SLP  LTG: Pt will improve cognitive-communication skills to actively participate in care w/ 100% accuracy w/o cues.   -RD      Time Frame (Additional Goal 1, SLP)  by discharge  -RD        User Key  (r) = Recorded By, (t) = Taken By, (c) = Cosigned By    Initials Name Provider Type    Winnie Kaur MS CCC-SLP Speech and Language Pathologist             Time Calculation:   Time Calculation- SLP     Row Name 12/10/19 1718             Time Calculation- SLP    SLP Start Time  1420  -RD      SLP Received On  12/10/19  -RD        User Key  (r) = Recorded By, (t) = Taken By, (c) = Cosigned By    Initials Name Provider Type    Winnie Kaur MS CCC-SLP Speech and Language Pathologist          Therapy Charges for Today     Code Description Service Date Service Provider Modifiers Qty    23691688516 HC ST MOTION FLUORO EVAL SWALLOW 4 12/10/2019 Winnie Durbin MS CCC-SLP GN 1    64213049425 HC ST EVAL SPEECH AND PROD W LANG  3 12/10/2019 Winnie Durbin MS CCC-SLP GN 1        Acute Care - Speech Language Pathology Initial Evaluation  [facility]     Patient Name: Tho Kohli  : 10/8/1927  MRN: [id number]  Today's Date: 12/10/2019               Admit Date: 2019     Visit Dx:    ICD-10-CM ICD-9-CM   1. Dysarthria R47.1 784.51   2. Left-sided weakness R53.1 728.87   3. Impaired mobility and ADLs Z74.09 799.89   4.  Oropharyngeal dysphagia R13.12 787.22   5. 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 No pertinent surgical history.     SLP EVALUATION (last 72 hours)      SLP SLC Evaluation     Row Name 12/10/19 2189                   General Information    Prior Level of Function-Communication  WFL  -RD        Patient's Goals for Discharge  return to all previous roles/activities  -RD           Comprehension Assessment/Intervention    Comprehension Assessment/Intervention  Auditory Comprehension;Reading Comprehension  -RD           Auditory Comprehension Assessment/Intervention    Auditory Comprehension (Communication)  WFL  -RD           Reading Comprehension Assessment/Intervention    Reading Comprehension (Communication)  unable/difficult to assess;other (see comments) 2' visual deficits  -RD        Scanning (Reading)  WFL;sentences;other (see comments) larg print  -RD        Reading Comprehension, Comment  Difficult to assess 2' visual deficits. Required large print. Dx/tx w/ larger print at paragraph level, suspect no difficulty  -RD           Expression Assessment/Intervention    Expression Assessment/Intervention  verbal expression;graphic expression  -RD           Verbal Expression Assessment/Intervention    Verbal Expression  WFL  -RD        Automatic Speech (Communication)  WFL  -RD        Repetition  WFL;sentences  -RD        Phrase Completion  WFL;unpredictable  -RD        Responsive Naming  WFL  -RD        Spontaneous/Functional Words  WFL  -RD        Sentence Formulation  WFL  -RD           Graphic Expression Assessment/Intervention    Graphic Expression  WFL  -RD        Graphic Expression, Comment   Pt able to write a sentence using two key words w/o difficulty.   -RD           Oral Musculature and Cranial Nerve Assessment    Oral Motor General Assessment  oral labial or buccal impairment;lingual impairment;velar impairment;vocal impairment  -RD        Oral Labial or Buccal Impairment, Detail, Cranial Nerve VII (Facial):  left labial droop  -RD        Lingual Impairment, Detail. Cranial Nerves IX, XII (Glossopharyngeal and Hypoglossal)  reduced strength left;reduced lingual ROM  -RD        Velar Impairment, Detail, Cranial Nerves X, XI (Vagus and Accessory)  CN10/11: Motor Impairment;reduced velar strength  -RD        Vocal Impairment, Detail. Cranial Nerve X (Vagus)  other (see comments) weak vocal quality  -RD           Motor Speech Assessment/Intervention    Motor Speech Function  moderate impairment  -RD        Characteristics Consistent with Dysarthria  slow rate;decreased intensity;decreased articulation;hypernasality;slurred speech  -RD           Cognitive Assessment Intervention- SLP    Cognitive Function (Cognition)  mild impairment;moderate impairment  -RD        Orientation Status (Cognition)  WFL  -RD        Memory (Cognitive)  mild impairment;delayed;WFL;simple;immediate  -RD        Attention (Cognitive)  WFL  -RD        Thought Organization (Cognitive)  mild impairment;mental manipulation  -RD        Reasoning (Cognitive)  WFL;analogies;deductive  -RD        Problem Solving (Cognitive)  WFL;temporal  -RD        Functional Math (Cognitive)  WFL;word problems  -RD        Executive Function (Cognition)  mild impairment;moderate impairment;deficit awareness;self-monitoring/correction  -RD        Pragmatics (Communication)  WFL  -RD        Right Hemisphere Function  deficit awareness;safety awareness  -RD           SLP Clinical Impressions    SLP Diagnosis  Moderate dysarthria and mild-moderate cognitive-linguistic deficit primarily impacting delayed memory recall, thought organization, and executive  function. Will address during treatment.   -RD        Rehab Potential/Prognosis  good  -RD        SLC Criteria for Skilled Therapy Interventions Met  yes  -RD        Functional Impact  difficulty in expressing complex messages;difficulty completing home management task;functional impact in ADLs  -RD           Recommendations    Therapy Frequency (SLP SLC)  5 days per week  -RD          User Key  (r) = Recorded By, (t) = Taken By, (c) = Cosigned By    Initials Name Effective Dates    RD Winnie Durbin MS CCC-SLP 04/03/18 -              EDUCATION  The patient has been educated in the following areas:     Cognitive Impairment Communication Impairment.    SLP Recommendation and Plan  SLP Diagnosis: Moderate dysarthria and mild-moderate cognitive-linguistic deficit primarily impacting delayed memory recall, thought organization, and executive function. Will address during treatment.      Swallow Criteria for Skilled Therapeutic Interventions Met: demonstrates skilled criteria  SLC Criteria for Skilled Therapy Interventions Met: yes  Anticipated Dischage Disposition: inpatient rehabilitation facility, anticipate therapy at next level of care  Therapy Frequency (Swallow): 5 days per week  Predicted Duration Therapy Intervention (Days): until discharge    Plan of Care Reviewed With: patient      SLP GOALS     Row Name 12/10/19 7423             Oral Nutrition/Hydration Goal 1 (SLP)    Oral Nutrition/Hydration Goal 1, SLP  LTG: Pt will return to regular diet and thin liquids w/ 100% accuracy w/o cues  -RD      Time Frame (Oral Nutrition/Hydration Goal 1, SLP)  by discharge  -RD         Oral Nutrition/Hydration Goal 2 (SLP)    Oral Nutrition/Hydration Goal 2, SLP  Pt will tolerate level 3-pureed w/ some mashed and honey-thick liquids w/ compensations w/ no overt s/s of aspiration w/ 100% accuracy w/o cues  -RD      Time Frame (Oral Nutrition/Hydration Goal 2, SLP)  short term goal (STG);by discharge  -RD         Lingual  Strengthening Goal 1 (SLP)    Activity (Lingual Strengthening Goal 1, SLP)  increase lingual tone/sensation/control/coordination/movement;increase tongue back strength  -RD      Increase Lingual Tone/Sensation/Control/Coordination/Movement  lingual movement exercises  -RD      Increase Tongue Back Strength  lingual resistance exercises  -RD      [place]/Accuracy (Lingual Strengthening Goal 1, SLP)  with minimal cues (75-90% accuracy)  -RD      Time Frame (Lingual Strengthening Goal 1, SLP)  short term goal (STG);by discharge  -RD         Pharyngeal Strengthening Exercise Goal 1 (SLP)    Activity (Pharyngeal Strengthening Goal 1, SLP)  increase timing;increase closure at entrance to airway/closure of airway at glottis;increase squeeze/positive pressure generation;increase tongue base retraction;increase PES opening  -RD      Increase Timing  prepping - 3 second prep or suck swallow or 3-step swallow  -RD      Increase Closure at Entrance to Airway/Closure of Airway at Glottis  super-supraglottic swallow  -RD      Increase Squeeze/Positive Pressure Generation  effortful pitch glide (falsetto + pharyngeal squeeze)  -RD      Increase Tongue Base Retraction  hard effortful swallow;williams  -RD      Increase PES Opening  chin tuck against resistance (CTAR)  -RD      Lee/Accuracy (Pharyngeal Strengthening Goal 1, SLP)  with minimal cues (75-90% accuracy)  -RD      Time Frame (Pharyngeal Strengthening Goal 1, SLP)  short term goal (STG);by discharge  -RD         Swallow Management Recall Goal 1 (SLP)    Activity (Swallow Management Recall Goal 1, SLP)  recall of;safe diet level/texture;safe liquid viscosity;compensatory swallow strategies/techniques;postural techniques;rationale for use of strategies/techniques  -RD      Lee/Accuracy (Swallow Management Recall Goal 1, SLP)  with minimal cues (75-90% accuracy)  -RD      Time Frame (Swallow Management Recall Goal 1, SLP)  short term goal (STG);by  discharge  -RD         Swallow Compensatory Strategies Goal 1 (SLP)    Activity (Swallow Compensatory Strategies/Techniques Goal 1, SLP)  compensatory strategies;postural techniques;during meal intake;small bites;small cup sips;chin tuck posture;other (see comments) L lingual sweep w/ solids  -RD      [place]/Accuracy (Swallow Compensatory Strategies/Techniques Goal 1, SLP)  independently (over 90% accuracy)  -RD      Time Frame (Swallow Compensatory Strategies/Techniques Goal 1, SLP)  short term goal (STG);by discharge  -RD         Phonation Goal 1 (SLP)    Improve Phonation By Goal 1 (SLP)  using loud speech;80%;with minimal cues (75-90%)  -RD      Time Frame (Phonation Goal 1, SLP)  short term goal (STG);by discharge  -RD         Reduce Perception of Hypernasality Goal 1 (SLP)    Reduce Perception of Hypernasality By Goal 1 (SLP)  opening mouth wider for speech;80%;with minimal cues (75-90%)  -RD      Time Frame (Perception of Hypernasality Goal 1, SLP)  short term goal (STG);by discharge  -RD         Articulation Goal 1 (SLP)    Improve Articulation Goal 1 (SLP)  of specific sounds in connected speech;by over-articulating in connected speech;80%;with minimal cues (75-90%)  -RD      Time Frame (Articulation Goal 1, SLP)  short term goal (STG);by discharge  -RD         Memory Skills Goal 1 (SLP)    Improve Memory Skills Through Goal 1 (SLP)  recalling unrelated word lists with an imposed delay;80%;with minimal cues (75-90%)  -RD      Time Frame (Memory Skills Goal 1, SLP)  short term goal (STG);by discharge  -RD         Executive Functional Skills Goal 1 (SLP)    Improve Executive Function Skills Goal 1 (SLP)  demonstrate awareness of deficit;identify strategies, strengths, limitations;identify anticipated needs;80%;with minimal cues (75-90%)  -RD      Time Frame (Executive Function Skills Goal 1, SLP)  short term goal (STG);by discharge  -RD         Additional Goal 1 (SLP)    Additional Goal 1, SLP  LTG: Pt  will improve cognitive-communication skills to actively participate in care w/ 100% accuracy w/o cues.   -RD      Time Frame (Additional Goal 1, SLP)  by discharge  -RD        User Key  (r) = Recorded By, (t) = Taken By, (c) = Cosigned By    Initials Name Provider Type    Winnie Kaur MS CCC-SLP Speech and Language Pathologist                  Time Calculation:     Time Calculation- SLP     Row Name 12/10/19 1717             Time Calculation- SLP    SLP Start Time  1420  -RD      SLP Received On  12/10/19  -RD        User Key  (r) = Recorded By, (t) = Taken By, (c) = Cosigned By    Initials Name Provider Type    Winnie Kaur MS CCC-SLP Speech and Language Pathologist          Therapy Charges for Today     Code Description Service Date Service Provider Modifiers Qty    15950886869 HC ST MOTION FLUORO EVAL SWALLOW 4 12/10/2019 Winnie Durbin MS CCC-SLP GN 1    97233260557 HC ST EVAL SPEECH AND PROD W LANG  3 12/10/2019 Winnie Durbin MS CCC-SLP GN 1                     Winnie Durbin MS CCC-SLP  12/10/2019       Winnie Durbin MS CCC-SLP  12/10/2019

## 2019-12-10 NOTE — THERAPY EVALUATION
Acute Care - Occupational Therapy Initial Evaluation  [facility]     Patient Name: Tho Kohli  : 10/8/1927  MRN: [id number]  Today's Date: 12/10/2019     Date of Referral to OT: 12/10/19       Admit Date: 2019       ICD-10-CM ICD-9-CM   1. Dysarthria R47.1 784.51   2. Left-sided weakness R53.1 728.87   3. Impaired mobility and ADLs Z74.09 799.89     Patient Active Problem List   Diagnosis   • Stroke (CMS/[facility])   • Essential hypertension   • Dysarthria   • SALVADOR (acute kidney injury) (CMS/[facility])     Past Medical History:   Diagnosis Date   • OAB (overactive bladder)      History reviewed. No pertinent surgical history.       OT ASSESSMENT FLOWSHEET (last 12 hours)      Occupational Therapy Evaluation     Row Name 12/10/19 0815                   OT Evaluation Time/Intention    Subjective Information  no complaints  -KA        Document Type  evaluation  -KA        Mode of Treatment  occupational therapy  -KA        Patient Effort  excellent  -KA           General Information    Patient Profile Reviewed?  yes  -KA        Patient/Family Observations  son present  -KA        Prior Level of Function  independent:;all household mobility;ADL's  -KA        Equipment Currently Used at Home  walker, rolling  -KA        Existing Precautions/Restrictions  fall  -KA        Equipment Issued to Patient  feeding equipment;adaptive cup  -KA        Risks Reviewed  patient and family:;LOB;nausea/vomiting;dizziness;increased discomfort;change in vital signs;increased drainage;lines disloged  -KA        Benefits Reviewed  patient and family:;improve function;increase independence;increase strength;increase balance;decrease pain;decrease risk of DVT;improve skin integrity;increase knowledge  -KA        Barriers to Rehab  none identified  -KA           Relationship/Environment    Primary Source of Support/Comfort  child(gladis)  -KA        Lives With  child(gladis), adult  -KA        Family Caregiver if Needed  child(gladis), adult  -KA            Resource/Environmental Concerns    Current Living Arrangements  independent/assisted living facility  -        Resource/Environmental Concerns  none  -           Cognitive Assessment/Interventions    Additional Documentation  Cognitive Assessment/Intervention (Group)  -           Cognitive Assessment/Intervention- PT/OT    Affect/Mental Status (Cognitive)  WFL  -        Orientation Status (Cognition)  oriented x 4  -KA        Follows Commands (Cognition)  follows two step commands;over 90% accuracy  -        Cognitive Function (Cognitive)  safety deficit  -        Safety Deficit (Cognitive)  mild deficit;insight into deficits/self awareness;judgment;problem solving;safety precautions awareness  -           Bed Mobility Assessment/Treatment    Bed Mobility Assessment/Treatment  supine-sit  -        Supine-Sit Raymond (Bed Mobility)  moderate assist (50% patient effort)  -        Assistive Device (Bed Mobility)  bed rails;head of bed elevated  -           Transfer Assessment/Treatment    Transfer Assessment/Treatment  sit-stand transfer;stand-sit transfer;bed-chair transfer  -        Comment (Transfers)  unable to reach full upright posture  -           Bed-Chair Transfer    Bed-Chair Raymond (Transfers)  moderate assist (50% patient effort)  -KA           Sit-Stand Transfer    Sit-Stand Raymond (Transfers)  moderate assist (50% patient effort)  -           Stand-Sit Transfer    Stand-Sit Raymond (Transfers)  moderate assist (50% patient effort)  -           ADL Assessment/Intervention    13999 - OT Self Care/Mgmt Minutes  15  -KA        BADL Assessment/Intervention  lower body dressing;feeding  -           Lower Body Dressing Assessment/Training    Lower Body Dressing Raymond Level  doff;don;socks;pants/bottoms;maximum assist (25% patient effort)  -        Lower Body Dressing Position  edge of bed sitting  -           Self-Feeding Assessment/Training     [place] Level (Feeding)  scoop food and bring to mouth;liquids to mouth;minimum assist (75% patient effort)  -KA        Assistive Devices (Feeding)  built-up handle utensils  -KA        Position (Self-Feeding)  supported sitting  -KA           BADL Safety/Performance    Impairments, BADL Safety/Performance  balance;endurance/activity tolerance;cognition;coordination;strength;trunk/postural control;motor control  -KA           General ROM    GENERAL ROM COMMENTS  B UE WFL; previous deficits in B shoulders  -KA           MMT (Manual Muscle Testing)    General MMT Comments  B UE grossly 4-/5  -KA           Motor Assessment/Interventions    Additional Documentation  Fine Motor Testing & Training (Group)  -KA           Fine Motor Testing & Training    Fine Motor Tests  other (see comments) thumb opposition and finger to nose; L UE grossly impaired.   -KA           Sensory Assessment/Intervention    Sensory General Assessment  no sensation deficits identified  -KA           Positioning and Restraints    Pre-Treatment Position  in bed  -KA        Post Treatment Position  chair  -KA        In Chair  call light within reach;encouraged to call for assist;exit alarm on;with family/caregiver;with nsg  -KA           Pain Scale: FACES Pre/Post-Treatment    Pain: FACES Scale, Pretreatment  0-->no hurt  -KA        Pain: FACES Scale, Post-Treatment  0-->no hurt  -KA           Coping    Observed Emotional State  accepting;calm;cooperative  -        Verbalized Emotional State  acceptance  -KA           Plan of Care Review    Plan of Care Reviewed With  patient;son  -           Clinical Impression (OT)    Date of Referral to OT  12/10/19  -        OT Diagnosis  decreased ADL/IADL independence  -KA        Patient/Family Goals Statement (OT Eval)  return to [facility]  -        Criteria for Skilled Therapeutic Interventions Met (OT Eval)  yes;treatment indicated  -KA        Rehab Potential (OT Eval)  good, to achieve stated  therapy goals  -KA        Therapy Frequency (OT Eval)  daily  -KA        Predicted Duration of Therapy Intervention (Therapy Eval)  10  -KA        Care Plan Review (OT)  evaluation/treatment results reviewed;care plan/treatment goals reviewed;risks/benefits reviewed;current/potential barriers reviewed;patient/other agree to care plan  -KA        Care Plan Review, Other Participant (OT Eval)  son  -KA        Anticipated Discharge Disposition (OT)  inpatient rehabilitation facility  -KA           Vital Signs    Pre Systolic BP Rehab  177  -KA        Pre Treatment Diastolic BP  95  -KA        Post Systolic BP Rehab  172  -KA        Post Treatment Diastolic BP  107  -KA        Pretreatment Heart Rate (beats/min)  83  -KA        Posttreatment Heart Rate (beats/min)  85  -KA        Pre SpO2 (%)  95  -KA        Post SpO2 (%)  97  -KA        Pre Patient Position  Supine  -KA        Intra Patient Position  Standing  -KA        Post Patient Position  Sitting  -KA           OT Goals    Transfer Goal Selection (OT)  transfer, OT goal 1  -KA        Dressing Goal Selection (OT)  dressing, OT goal 1  -KA        Toileting Goal Selection (OT)  toileting, OT goal 1  -KA        Strength Goal Selection (OT)  strength, OT goal 1  -KA        Additional Documentation  Strength Goal Selection (OT) (Row)  -KA           Transfer Goal 1 (OT)    Activity/Assistive Device (Transfer Goal 1, OT)  toilet;walker, rolling  -KA        [place] Level/Cues Needed (Transfer Goal 1, OT)  contact guard assist  -KA        Time Frame (Transfer Goal 1, OT)  long term goal (LTG);by discharge  -KA        Progress/Outcome (Transfer Goal 1, OT)  goal ongoing  -KA           Dressing Goal 1 (OT)    Activity/Assistive Device (Dressing Goal 1, OT)  lower body dressing socks  -KA        [place]/Cues Needed (Dressing Goal 1, OT)  supervision required  -KA        Time Frame (Dressing Goal 1, OT)  long term goal (LTG);by discharge  -KA        Progress/Outcome  (Dressing Goal 1, OT)  goal ongoing  -KA           Toileting Goal 1 (OT)    Activity/Device (Toileting Goal 1, OT)  adjust/manage clothing;perform perineal hygiene;commode;grab bar/safety frame  -        [place] Level/Cues Needed (Toileting Goal 1, OT)  supervision required  -KA        Time Frame (Toileting Goal 1, OT)  long term goal (LTG);by discharge  -        Progress/Outcome (Toileting Goal 1, OT)  goal ongoing  -KA           Strength Goal 1 (OT)    Strength Goal 1 (OT)  Pt will improve B UE from 4-/5 to 4+/5 and demo I w/HEP.   -KA        Time Frame (Strength Goal 1, OT)  long term goal (LTG);by discharge  -KA        Progress/Outcome (Strength Goal 1, OT)  goal ongoing  -          User Key  (r) = Recorded By, (t) = Taken By, (c) = Cosigned By    Initials Name Effective Dates    Taty Hargrove, KAY 07/17/19 -          Occupational Therapy Education                 Title: PT OT SLP Therapies (In Progress)     Topic: Occupational Therapy (In Progress)     Point: ADL training (Done)     Description:   Instruct learner(s) on proper safety adaptation and remediation techniques during self care or transfers.   Instruct in proper use of assistive devices.              Learning Progress Summary           Patient Acceptance, D,E, VU,DU,NR by LYDIA at 12/10/2019 0815    Comment:  Pt educated on role of OT, safety, fall prevention, ADLs, transfers, and POC.                   Point: Precautions (Done)     Description:   Instruct learner(s) on prescribed precautions during self-care and functional transfers.              Learning Progress Summary           Patient Acceptance, D,E, VU,DU,NR by LYDIA at 12/10/2019 0815    Comment:  Pt educated on role of OT, safety, fall prevention, ADLs, transfers, and POC.                   Point: Body mechanics (Done)     Description:   Instruct learner(s) on proper positioning and spine alignment during self-care, functional mobility activities and/or exercises.               Learning Progress Summary           Patient Acceptance, D,E, VU,DU,NR by LYDIA at 12/10/2019 0815    Comment:  Pt educated on role of OT, safety, fall prevention, ADLs, transfers, and POC.                               User Key     Initials Effective Dates Name Provider Type Discipline     07/17/19 -  Taty Magaña OT Occupational Therapist OT                  OT Recommendation and Plan  Outcome Summary/Treatment Plan (OT)  Anticipated Discharge Disposition (OT): inpatient rehabilitation facility  Therapy Frequency (OT Eval): daily  Plan of Care Review  Plan of Care Reviewed With: patient, son  Plan of Care Reviewed With: patient, son  Outcome Summary: OT completed brief chart review.  ADLs: modA.  Functional Transfer (stand-step): modA, facilitated weight shifting; unable to reach full upright posture.  Limiting Factors: strength, endurance, fine/gross motor skills of L side, coordination, balance, medical.  Recommended D/C: IRF.  Will cont to observe and address ADL/IADL deficitsd as needed.    Outcome Measures     Row Name 12/10/19 0815             How much help from another is currently needed...    Putting on and taking off regular lower body clothing?  2  -KA      Bathing (including washing, rinsing, and drying)  2  -KA      Toileting (which includes using toilet bed pan or urinal)  2  -KA      Putting on and taking off regular upper body clothing  2  -KA      Taking care of personal grooming (such as brushing teeth)  3  -KA      Eating meals  3  -KA      AM-PAC 6 Clicks Score (OT)  14  -KA         Modified Kerrick Scale    Pre-Stroke Modified Eliz Scale  2 - Slight disability.  Unable to carry out all previous activities but able to look after own affairs without assistance.  -KA      Modified Kerrick Scale  4 - Moderately severe disability.  Unable to walk without assistance, and unable to attend to own bodily needs without assistance.  -         Functional Assessment    Outcome Measure Options  AM-PAC  6 Clicks Daily Activity (OT);Modified Eliz  -LYDIA        User Key  (r) = Recorded By, (t) = Taken By, (c) = Cosigned By    Initials Name Provider Type    Taty Hargrove OT Occupational Therapist          Time Calculation:   Time Calculation- OT     Row Name 12/10/19 0815             Time Calculation- OT    OT Start Time  0815  -KA      OT Received On  12/10/19  -LYDIA      OT Goal Re-Cert Due Date  12/20/19  -LYDIA         Timed Charges    86093 - OT Self Care/Mgmt Minutes  15  -KA        User Key  (r) = Recorded By, (t) = Taken By, (c) = Cosigned By    Initials Name Provider Type    Taty Hargrove OT Occupational Therapist        Therapy Charges for Today     Code Description Service Date Service Provider Modifiers Qty    50826304900 HC OT SELF CARE/MGMT/TRAIN EA 15 MIN 12/10/2019 Taty Magaña OT GO 1    95626716366 HC OT EVAL LOW COMPLEXITY 4 12/10/2019 Taty Magaña OT GO 1               Taty Magaña OT  12/10/2019

## 2019-12-10 NOTE — PLAN OF CARE
Problem: Patient Care Overview  Goal: Plan of Care Review  Flowsheets (Taken 12/10/2019 0815)  Outcome Summary: OT completed brief chart review.  ADLs: maxA.  Functional Transfer (stand-step): modA, facilitated weight shifting; unable to reach full upright posture.  Limiting Factors: strength, endurance, fine/gross motor skills of L side, coordination, balance, medical.  Recommended D/C: IRF.  Will cont to observe and address ADL/IADL deficitsd as needed.

## 2019-12-10 NOTE — DISCHARGE INSTR - DIET
MBS/VFSS 12/10/19  Reason for Referral  Patient was referred for a MBS to assess the efficiency of his/her swallow function, rule out aspiration and make recommendations regarding safe dietary consistencies, effective compensatory strategies, and safe eating environment.             Recommendations/Treatment  SLP Swallowing Diagnosis: moderate, oral dysfunction, mod-severe, pharyngeal dysfunction  Functional Impact: risk of aspiration/pneumonia  Rehab Potential/Prognosis, Swallowing: good, to achieve stated therapy goals  Swallow Criteria for Skilled Therapeutic Interventions Met: demonstrates skilled criteria  Therapy Frequency (Swallow): 5 days per week  Predicted Duration Therapy Intervention (Days): until discharge  SLP Diet Recommendation: puree with some mashed, honey thick liquids, other (see comments)(supervision w/ meals to ensure compensations)  Recommended Precautions and Strategies: chin Tuck, upright posture during/after eating, small bites of food and sips of liquid, no straw, check mouth frequently for oral residue/pocketing, other (see comments)(aspiration/reflux precautions; oral care; L lingual sweep)  SLP Rec. for Method of Medication Administration: meds crushed, with pudding or applesauce(+ chin tuck)  Monitor for Signs of Aspiration: yes, notify SLP if any concerns  Anticipated Dischage Disposition: inpatient rehabilitation facility, anticipate therapy at next level of care    Instrumental Set-up  Utensils Used: spoon, cup, straw  Consistencies Trialed: thin liquids, nectar/syrup-thick liquids, honey-thick liquids, pudding thick, regular textures    Oral Preparation/ Oral Phase  Oral Prep Phase: impaired oral phase of swallowing  Oral Transit Phase: impaired  Oral Residue: impaired  Oral Preparatory Phase: anterior loss, prolonged manipulation  Anterior Loss: all consistencies tested, secondary to reduced labial seal, other (see comments)(somewhat at baseline 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)  Prolonged Manipulation: pudding/puree, regular textures, secondary to reduced lingual strength, secondary to reduced lingual range of motion  Impaired Oral Transit Phase: increased A-P transit time, premature spillage of liquids into pharynx  Premature Spillage of Liquids into Pharynx: thin liquids, nectar-thick liquids, honey-thick liquids, secondary to reduced lingual control  Impaired Oral Residue: lingual residue, lateral sulci residue, diffuse residue throughout oral cavity  Lingual Residue: all consistencies tested, secondary to reduced lingual strength, secondary to reduced lingual range of motion  Lateral Sulci Residue: regular textures, secondary to reduced lingual strength, secondary to reduced lingual range of motion, other (see comments)(able to mostly clear w/ cued lingual sweep- L lateral sulci)  Diffuse Residue throughout Oral Cavity: all consistencies tested, secondary to reduced lingual strength, secondary to reduced lingual range of motion  Response to Oral Residue: with compensatory maneuver (see comments), other (see comments)(mostly cleared w/ cued lingual sweep + re-swallow)  Oral Residue, Comment: moderate oral residue (> w/ solids in L lateral sulci- mostly cleared w/ cued lingual sweep + re-swallow). Was losing residue anteriorly on L after the swallow. Best recs for Level 3-pureed w/ some mashed + L lingual sweep.     Pharyngeal Phase  Initiation of Pharyngeal Swallow: bolus in pyriform sinuses  Pharyngeal Phase: impaired pharyngeal phase of swallowing  Penetration During the Swallow: thin liquids, nectar-thick liquids, honey-thick liquids, pudding/puree, secondary to delayed swallow initiation or mistiming, secondary to reduced vestibular closure  Aspiration During the Swallow: thin liquids, nectar-thick liquids, honey-thick liquids, secondary to delayed swallow initiation or mistiming, secondary to reduced vestibular closure  Penetration After the Swallow: thin liquids, nectar-thick  liquids, secondary to residue, in valleculae  Aspiration After the Swallow: thin liquids, nectar-thick liquids, honey-thick liquids, secondary to residue, in laryngeal vestibule  Response to Penetration: deep, no response  Response to Aspiration: no response, silent aspiration, response with cue only, could not clear subglottic material  Pharyngeal Residue: all consistencies tested, base of tongue, valleculae, pyriform sinuses, posterior pharyngeal wall, laryngeal vestibule, diffuse within pharynx, secondary to reduced base of tongue retraction, secondary to reduced posterior pharyngeal wall stripping, other (see comments)(reduced PES opening impacting)  Response to Residue: cleared residue with compensatory maneuver (see comments), other (see comments)(mostly)  Attempted Compensatory Maneuvers: bolus size, bolus presentation style, chin tuck, multiple swallows, throat clear after swallow  Response to Attempted Compensatory Maneuvers: prevented aspiration, reduced residue  Pharyngeal Phase, Comment: Moderate-severe pharyngeal dysphagia. Penetration/aspiration during w/ continued aspiration of vestibular residue upon consecutive swallows w/ thins, nectar, and honey-thick liquids. Aspiration was silent. Pt was unable to clear aspiration even w/ cued cough. Penetration occurred x1 w/ pudding. Pre-spill+ delayed initiation + poor vestibular closure contributing to penetration/aspiration. Moderate-severe diffuse pharyngeal residue (> in valleculae and pooling to the pyriform sinuses) w/ all consistencies 2' reduced base of tongue retraction and decr'd pharyngeal stripping (suspect prominent inward curvature of cervical spine somewhat impacting). Multiple compensations attempted. Pt able to prevent aspiration and significantly reduce residue w/ small bites/single sips of honey-thick liquids via tsp/cup and w/ pudding/solids. Chin tuck attempted w/ nectar-thick, but still had deep penetration posing higher aspiration risk  given mistiming. Pt would benefit from modified diet w/ compensations + oropharyngeal strengthening during treatment. Pt agreeable to recs. Pt able to teach back compensations w/ min cues x1, but given new memory deficits, will likely need supervision to ensure use of compensations during meals at this time.     Cervical Esophageal Phase  Esophageal Phase: esophageal retention with retrograde flow through PES  Esophageal Phase, Comment: Prominent cricopharyngeus and reduced PES opening impacting. Pt would benefit from reflux precautions             FEES Reason for Referral 12/13/19  Patient was referred for a FEES to assess the efficiency of his/her swallow function, rule out aspiration and make recommendations regarding safe dietary consistencies, effective compensatory strategies, and safe eating environment.         Recommendations/Treatment  SLP Swallowing Diagnosis: (P) mild, oral dysfunction, mild-moderate, pharyngeal dysfunction  Functional Impact: (P) risk of aspiration/pneumonia, risk of malnutrition, risk of dehydration  Rehab Potential/Prognosis, Swallowing: (P) good, to achieve stated therapy goals  Swallow Criteria for Skilled Therapeutic Interventions Met: (P) demonstrates skilled criteria  Therapy Frequency (Swallow): (P) 5 days per week  Predicted Duration Therapy Intervention (Days): (P) until discharge  SLP Diet Recommendation: (P) mechanical soft with no mixed consistencies, nectar thick liquids  Recommended Precautions and Strategies: (P) upright posture during/after eating, small bites of food and sips of liquid, no straw, other (see comments)(General aspiration precautions )  SLP Rec. for Method of Medication Administration: (P) meds whole, meds crushed, with pudding or applesauce, as tolerated  Monitor for Signs of Aspiration: (P) yes, notify SLP if any concerns  Anticipated Dischage Disposition: (P) unknown, anticipate therapy at next level of care    Instrumental Set-up  Risks/Benefits  Reviewed: (P) risks/benefits explained, patient, agreed to eval  Nasal Entry: (P) left:  Anatomic Considerations: (P) anatomic deviation observed (see comments)  Comment: (P) Prominent posterior pharyngeal wall  Utensils Used: (P) Spoon, Cup, Straw  Consistencies Trialed: (P) thin liquids, nectar-thick liquids, pudding/puree, Regular textures    Oral Preparation/ Oral Phase  Oral Phase: (P) anterior loss, prespill of liquids into pharynx, oral residue present    Pharyngeal Phase  Initiation of Pharyngeal Swallow: (P) bolus in pyriform sinuses  Pharyngeal Phase: (P) impaired pharyngeal phase of swallowing  Penetration During the Swallow: (P) thin liquids, nectar-thick liquids, secondary to delayed swallow initiation or mistiming, secondary to reduced vestibular closure  Penetration After the Swallow: (P) pudding/puree, other (see comments)(During subsequent swallows )  Aspiration After the Swallow: (P) thin liquids, secondary to residue, in laryngeal vestibule  Response to Aspiration: (P) no response, silent aspiration, response with cue only  Rosenbek's Scale: (P) thin:, 8-->Level 8, nectar:, pudding/puree:, 2-->Level 2  Residue: (P) valleculae, secondary to reduced base of tongue retraction, secondary to reduced posterior pharyngeal wall stripping  Response to Residue: (P) other (see comments)(Reduced w/ multiple swallows )  Attempted Compensatory Maneuvers: (P) bolus size, bolus presentation style  Response to Attempted Compensatory Maneuvers: (P) prevented penetration  FEES Summary: (P) Mild oral and mild-moderate pharyngeal dysphagia. Prominent posterior pharyngeal wall upon entry into the larynx. Mild anterior loss noted w liquids, pt reports nerve damage 2' previous procedures. Intermittent premature spillage noted with thin and nectar thick liquids 2' reduced lingual control. Mild diffuse oral residue noted solids 2' reduced lingual strength. Residue reduced with liquid wash and subsequent swallows.  Penetration of thin liquids via cup and straw and nectar thick liquids via straw during the swallow 2' prespill, delay, and reduced vestibular closure. Penetration did not clear unless cued to cough. Thin liquid vestibular residue was subsequently aspirated during a spontaneous subsequent swallow. Aspiration was silent.  Penetration of pudding after the swallow after pudding mixed w/ thin liquid residue, no penetration or aspiration appreciated with any other trials of pudding. No aspiration or penetration appreciated w/ regular solids. Chin tuck compensatory strategy was not attempted 2' pt's inability to use consistently. Mild-moderate vallecular residue which would pool to the pyriform sinuses at times 2' reduced base of tongue retraction and decreased pharyngeal wall stripping. Residue would reduce w/ spontaneous multiple swallows. Recommend: Nectar thick liquids, no straws, and dysphagia level IV (mechanical soft, no mixed consistencies). SLP will follow-up to continue dysphagia tx.

## 2019-12-10 NOTE — PLAN OF CARE
Problem: Patient Care Overview  Goal: Plan of Care Review  Outcome: Ongoing (interventions implemented as appropriate)  Flowsheets (Taken 12/10/2019 1420)  Plan of Care Reviewed With: patient  Note:   SLP MBS & cognitive-communication evaluations completed. Will address moderate oral and moderate-severe pharyngeal dysphagia during treatment. Will also address moderate dysarthria and cognitive-communication deficit during treatment. Please see note for further details and recommendations.

## 2019-12-11 ENCOUNTER — APPOINTMENT (OUTPATIENT)
Dept: CARDIOLOGY | Facility: HOSPITAL | Age: 84
End: 2019-12-11

## 2019-12-11 LAB
BACTERIA UR QL AUTO: ABNORMAL /HPF
BH CV ECHO MEAS - AO ROOT DIAM: 4 CM
BH CV LOWER VASCULAR LEFT COMMON FEMORAL AUGMENT: NORMAL
BH CV LOWER VASCULAR LEFT COMMON FEMORAL COMPRESS: NORMAL
BH CV LOWER VASCULAR LEFT COMMON FEMORAL PHASIC: NORMAL
BH CV LOWER VASCULAR LEFT COMMON FEMORAL SPONT: NORMAL
BH CV LOWER VASCULAR LEFT DISTAL FEMORAL AUGMENT: NORMAL
BH CV LOWER VASCULAR LEFT DISTAL FEMORAL COMPRESS: NORMAL
BH CV LOWER VASCULAR LEFT DISTAL FEMORAL PHASIC: NORMAL
BH CV LOWER VASCULAR LEFT DISTAL FEMORAL SPONT: NORMAL
BH CV LOWER VASCULAR LEFT GASTRONEMIUS COMPRESS: NORMAL
BH CV LOWER VASCULAR LEFT GREATER SAPH AK COMPRESS: NORMAL
BH CV LOWER VASCULAR LEFT GREATER SAPH BK COMPRESS: NORMAL
BH CV LOWER VASCULAR LEFT LESSER SAPH COMPRESS: NORMAL
BH CV LOWER VASCULAR LEFT MID FEMORAL AUGMENT: NORMAL
BH CV LOWER VASCULAR LEFT MID FEMORAL COMPRESS: NORMAL
BH CV LOWER VASCULAR LEFT MID FEMORAL PHASIC: NORMAL
BH CV LOWER VASCULAR LEFT MID FEMORAL SPONT: NORMAL
BH CV LOWER VASCULAR LEFT PERONEAL AUGMENT: NORMAL
BH CV LOWER VASCULAR LEFT PERONEAL COMPRESS: NORMAL
BH CV LOWER VASCULAR LEFT POPLITEAL AUGMENT: NORMAL
BH CV LOWER VASCULAR LEFT POPLITEAL COMPRESS: NORMAL
BH CV LOWER VASCULAR LEFT POPLITEAL PHASIC: NORMAL
BH CV LOWER VASCULAR LEFT POPLITEAL SPONT: NORMAL
BH CV LOWER VASCULAR LEFT POSTERIOR TIBIAL AUGMENT: NORMAL
BH CV LOWER VASCULAR LEFT POSTERIOR TIBIAL COMPRESS: NORMAL
BH CV LOWER VASCULAR LEFT PROFUNDA FEMORAL AUGMENT: NORMAL
BH CV LOWER VASCULAR LEFT PROFUNDA FEMORAL PHASIC: NORMAL
BH CV LOWER VASCULAR LEFT PROFUNDA FEMORAL SPONT: NORMAL
BH CV LOWER VASCULAR LEFT PROXIMAL FEMORAL AUGMENT: NORMAL
BH CV LOWER VASCULAR LEFT PROXIMAL FEMORAL COMPRESS: NORMAL
BH CV LOWER VASCULAR LEFT PROXIMAL FEMORAL PHASIC: NORMAL
BH CV LOWER VASCULAR LEFT PROXIMAL FEMORAL SPONT: NORMAL
BH CV LOWER VASCULAR LEFT SAPHENOFEMORAL JUNCTION AUGMENT: NORMAL
BH CV LOWER VASCULAR LEFT SAPHENOFEMORAL JUNCTION COMPRESS: NORMAL
BH CV LOWER VASCULAR LEFT SAPHENOFEMORAL JUNCTION PHASIC: NORMAL
BH CV LOWER VASCULAR LEFT SAPHENOFEMORAL JUNCTION SPONT: NORMAL
BH CV LOWER VASCULAR RIGHT COMMON FEMORAL AUGMENT: NORMAL
BH CV LOWER VASCULAR RIGHT COMMON FEMORAL COMPRESS: NORMAL
BH CV LOWER VASCULAR RIGHT COMMON FEMORAL PHASIC: NORMAL
BH CV LOWER VASCULAR RIGHT COMMON FEMORAL SPONT: NORMAL
BH CV LOWER VASCULAR RIGHT DISTAL FEMORAL AUGMENT: NORMAL
BH CV LOWER VASCULAR RIGHT DISTAL FEMORAL COMPRESS: NORMAL
BH CV LOWER VASCULAR RIGHT DISTAL FEMORAL PHASIC: NORMAL
BH CV LOWER VASCULAR RIGHT DISTAL FEMORAL SPONT: NORMAL
BH CV LOWER VASCULAR RIGHT GASTRONEMIUS COMPRESS: NORMAL
BH CV LOWER VASCULAR RIGHT GREATER SAPH AK COMPRESS: NORMAL
BH CV LOWER VASCULAR RIGHT GREATER SAPH BK COMPRESS: NORMAL
BH CV LOWER VASCULAR RIGHT LESSER SAPH COMPRESS: NORMAL
BH CV LOWER VASCULAR RIGHT MID FEMORAL AUGMENT: NORMAL
BH CV LOWER VASCULAR RIGHT MID FEMORAL COMPRESS: NORMAL
BH CV LOWER VASCULAR RIGHT MID FEMORAL PHASIC: NORMAL
BH CV LOWER VASCULAR RIGHT MID FEMORAL SPONT: NORMAL
BH CV LOWER VASCULAR RIGHT PERONEAL AUGMENT: NORMAL
BH CV LOWER VASCULAR RIGHT PERONEAL COMPRESS: NORMAL
BH CV LOWER VASCULAR RIGHT POPLITEAL AUGMENT: NORMAL
BH CV LOWER VASCULAR RIGHT POPLITEAL COMPRESS: NORMAL
BH CV LOWER VASCULAR RIGHT POPLITEAL PHASIC: NORMAL
BH CV LOWER VASCULAR RIGHT POPLITEAL SPONT: NORMAL
BH CV LOWER VASCULAR RIGHT POSTERIOR TIBIAL AUGMENT: NORMAL
BH CV LOWER VASCULAR RIGHT POSTERIOR TIBIAL COMPRESS: NORMAL
BH CV LOWER VASCULAR RIGHT PROFUNDA FEMORAL AUGMENT: NORMAL
BH CV LOWER VASCULAR RIGHT PROFUNDA FEMORAL PHASIC: NORMAL
BH CV LOWER VASCULAR RIGHT PROFUNDA FEMORAL SPONT: NORMAL
BH CV LOWER VASCULAR RIGHT PROXIMAL FEMORAL AUGMENT: NORMAL
BH CV LOWER VASCULAR RIGHT PROXIMAL FEMORAL COMPRESS: NORMAL
BH CV LOWER VASCULAR RIGHT PROXIMAL FEMORAL PHASIC: NORMAL
BH CV LOWER VASCULAR RIGHT PROXIMAL FEMORAL SPONT: NORMAL
BH CV LOWER VASCULAR RIGHT SAPHENOFEMORAL JUNCTION AUGMENT: NORMAL
BH CV LOWER VASCULAR RIGHT SAPHENOFEMORAL JUNCTION COMPRESS: NORMAL
BH CV LOWER VASCULAR RIGHT SAPHENOFEMORAL JUNCTION PHASIC: NORMAL
BH CV LOWER VASCULAR RIGHT SAPHENOFEMORAL JUNCTION SPONT: NORMAL
BILIRUB UR QL STRIP: NEGATIVE
CLARITY UR: CLEAR
COLOR UR: YELLOW
GLUCOSE UR STRIP-MCNC: NEGATIVE MG/DL
HGB UR QL STRIP.AUTO: ABNORMAL
HYALINE CASTS UR QL AUTO: ABNORMAL /LPF
KETONES UR QL STRIP: NEGATIVE
LEUKOCYTE ESTERASE UR QL STRIP.AUTO: ABNORMAL
NITRITE UR QL STRIP: NEGATIVE
PH UR STRIP.AUTO: 8 [PH] (ref 5–8)
PROT UR QL STRIP: ABNORMAL
RBC # UR: ABNORMAL /HPF
REF LAB TEST METHOD: ABNORMAL
SP GR UR STRIP: 1.02 (ref 1–1.03)
SQUAMOUS #/AREA URNS HPF: ABNORMAL /HPF
UROBILINOGEN UR QL STRIP: ABNORMAL
WBC UR QL AUTO: ABNORMAL /HPF

## 2019-12-11 PROCEDURE — 87186 SC STD MICRODIL/AGAR DIL: CPT | Performed by: NURSE PRACTITIONER

## 2019-12-11 PROCEDURE — 93970 EXTREMITY STUDY: CPT | Performed by: INTERNAL MEDICINE

## 2019-12-11 PROCEDURE — 93321 DOPPLER ECHO F-UP/LMTD STD: CPT

## 2019-12-11 PROCEDURE — 93312 ECHO TRANSESOPHAGEAL: CPT

## 2019-12-11 PROCEDURE — 93970 EXTREMITY STUDY: CPT

## 2019-12-11 PROCEDURE — 81001 URINALYSIS AUTO W/SCOPE: CPT | Performed by: NURSE PRACTITIONER

## 2019-12-11 PROCEDURE — 87088 URINE BACTERIA CULTURE: CPT | Performed by: NURSE PRACTITIONER

## 2019-12-11 PROCEDURE — 25010000002 HEPARIN (PORCINE) PER 1000 UNITS: Performed by: FAMILY MEDICINE

## 2019-12-11 PROCEDURE — 25010000002 MIDAZOLAM PER 1 MG: Performed by: INTERNAL MEDICINE

## 2019-12-11 PROCEDURE — 93325 DOPPLER ECHO COLOR FLOW MAPG: CPT | Performed by: INTERNAL MEDICINE

## 2019-12-11 PROCEDURE — B24BZZ4 ULTRASONOGRAPHY OF HEART WITH AORTA, TRANSESOPHAGEAL: ICD-10-PCS | Performed by: INTERNAL MEDICINE

## 2019-12-11 PROCEDURE — 93325 DOPPLER ECHO COLOR FLOW MAPG: CPT

## 2019-12-11 PROCEDURE — 92610 EVALUATE SWALLOWING FUNCTION: CPT

## 2019-12-11 PROCEDURE — 93321 DOPPLER ECHO F-UP/LMTD STD: CPT | Performed by: INTERNAL MEDICINE

## 2019-12-11 PROCEDURE — 87086 URINE CULTURE/COLONY COUNT: CPT | Performed by: NURSE PRACTITIONER

## 2019-12-11 PROCEDURE — 99231 SBSQ HOSP IP/OBS SF/LOW 25: CPT | Performed by: PSYCHIATRY & NEUROLOGY

## 2019-12-11 PROCEDURE — 99232 SBSQ HOSP IP/OBS MODERATE 35: CPT | Performed by: FAMILY MEDICINE

## 2019-12-11 PROCEDURE — 93312 ECHO TRANSESOPHAGEAL: CPT | Performed by: INTERNAL MEDICINE

## 2019-12-11 RX ORDER — MIDAZOLAM HYDROCHLORIDE 1 MG/ML
INJECTION INTRAMUSCULAR; INTRAVENOUS
Status: COMPLETED | OUTPATIENT
Start: 2019-12-11 | End: 2019-12-11

## 2019-12-11 RX ADMIN — MIDAZOLAM HYDROCHLORIDE 1 MG: 1 INJECTION, SOLUTION INTRAMUSCULAR; INTRAVENOUS at 09:57

## 2019-12-11 RX ADMIN — METHOHEXITAL SODIUM 20 MG: 500 INJECTION, POWDER, LYOPHILIZED, FOR SOLUTION INTRAMUSCULAR; INTRAVENOUS; RECTAL at 09:57

## 2019-12-11 RX ADMIN — HEPARIN SODIUM 5000 UNITS: 5000 INJECTION, SOLUTION INTRAVENOUS; SUBCUTANEOUS at 20:47

## 2019-12-11 RX ADMIN — SODIUM CHLORIDE, PRESERVATIVE FREE 10 ML: 5 INJECTION INTRAVENOUS at 20:49

## 2019-12-11 RX ADMIN — ATORVASTATIN CALCIUM 80 MG: 40 TABLET, FILM COATED ORAL at 20:46

## 2019-12-11 RX ADMIN — HEPARIN SODIUM 5000 UNITS: 5000 INJECTION, SOLUTION INTRAVENOUS; SUBCUTANEOUS at 14:10

## 2019-12-11 RX ADMIN — ASPIRIN 81 MG 81 MG: 81 TABLET ORAL at 10:53

## 2019-12-11 RX ADMIN — HEPARIN SODIUM 5000 UNITS: 5000 INJECTION, SOLUTION INTRAVENOUS; SUBCUTANEOUS at 05:01

## 2019-12-11 RX ADMIN — TAMSULOSIN HYDROCHLORIDE 0.4 MG: 0.4 CAPSULE ORAL at 10:54

## 2019-12-11 NOTE — PLAN OF CARE
Problem: Patient Care Overview  Goal: Plan of Care Review  Outcome: Ongoing (interventions implemented as appropriate)  Flowsheets (Taken 12/11/2019 0988)  Progress: no change  Plan of Care Reviewed With: patient; son  Note:   SLP re-evaluation completed. Will continue to address dysphagia. Please see note for further details and recommendations.

## 2019-12-11 NOTE — PLAN OF CARE
Problem: Patient Care Overview  Goal: Plan of Care Review  Outcome: Ongoing (interventions implemented as appropriate)  Flowsheets (Taken 12/11/2019 1652)  Progress: no change  Plan of Care Reviewed With: patient  Outcome Summary: BP remains elevated today. NIHSS score today is 3 r/t dysarthria, facial droop and ataxia. BLADIMIR this AM. SLP reevaluated pt at bedside today r/t concern for aspiration. Modified diet reordered and pt will remain on HTL. Pt refuses to be turned q2h, but is otherwise pleasant and compliant with care.

## 2019-12-11 NOTE — PROGRESS NOTES
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  MRI confirms multiple bilateral acute infarcts.    12/10 -patient is going for modified barium swallow today at 1 PM.  He is able to communicate and feels weak overall.  Reviewed imaging with the patient and his son including CT scans which were initially negative.  His MRI confirms multiple bilateral acute infarcts.  His work-up is in progress.  His echocardiogram shows EF of 61 to 65% with left ventricular diastolic dysfunction grade 2.  There is no history of atrial fibrillation.  On exam I noted his right leg looks a little bit larger than his left leg he is able to move both legs.  I will get an ultrasound of the legs to rule out DVT.  His carotid duplex show bilateral carotid artery plaque without significant stenosis with vertebral arterial flow antegrade bilaterally.  He was not taking aspirin prior to the stroke.  He is on aspirin 81 mg and Lipitor 80 mg.  Neurology consult is pending.     - Pt had BLADIMIR today that did not reveal any thrombus. His US of the lower ext is pending. He continues to require I/o cath. He did have more trouble swallowing today  And has increasing weakness. Could be partly related to the sedation he got during BLADIMIR. His appetite is decreased .  Discussed possibility of sedation effect versus worsening stroke symptoms with patient son.  Only time will tell how he improves or not tomorrow.    Review of Systems  General: No fever, chills, positive fatigue  ENT: No sore throat, positive trouble swallowing or changes in vision  Respiratory: No shortness of breath, cough, wheezing or fast breathing  Cardiovascular: No chest pain,  palpitations, dyspnea with exertion  Gastrointestinal: No nausea vomiting abdominal pain, positive decreased appetite and dysphasia  Musculoskeletal: Positive difficulty walking, positive weakness  Vascular: No cyanosis or clubbing  Lymphatic: No peripheral edema, no lymphadenopathy  Neurologic: No headache, confusion, dizziness; positive weakness, dysarthria and dysphasia  Psychiatric: No changes in mood.  No depression or anxiety.       Objective   Objective     Vital Signs:   Temp:  [97.2 °F (36.2 °C)-98.1 °F (36.7 °C)] 97.2 °F (36.2 °C)  Heart Rate:  [] 98  Resp:  [18-26] 24  BP: (145-188)/() 156/109  Total (NIH Stroke Scale): 3     Physical Exam:  Constitutional: No acute distress, awake, alert, nontoxic, thin body habitus  Eyes: Pupils equal, sclerae anicteric, no conjunctival injection  HENT: NCAT, mucous membranes moist  Neck: Supple, no thyromegaly, no lymphadenopathy  Respiratory: Clear to auscultation bilaterally, good effort, nonlabored respirations   Cardiovascular: RRR  Gastrointestinal: Positive bowel sounds, soft, nontender, nondistended  Musculoskeletal: No peripheral edema, normal muscle tone for age  Psychiatric: Appropriate affect, good insight and judgement, cooperative  Skin: No rashes, no jaundice, no petechiae, no mottling    Results Reviewed:    Results from last 7 days   Lab Units 12/09/19  1136 12/09/19  1131 12/09/19  1130   WBC 10*3/mm3 8.83  --   --    HEMOGLOBIN g/dL 12.9*  --   --    HEMOGLOBIN, POC g/dL  --  13.3  --    HEMATOCRIT % 39.6  --   --    HEMATOCRIT POC %  --  39  --    PLATELETS 10*3/mm3 197  --   --    INR   --   --  1.0     Results from last 7 days   Lab Units 12/10/19  0513 12/09/19  1419 12/09/19  1136 12/09/19  1131   SODIUM mmol/L 139  --   --   --    POTASSIUM mmol/L 3.6  --   --   --    CHLORIDE mmol/L 102  --   --   --    CO2 mmol/L 25.0  --   --   --    BUN mg/dL 23  --   --   --    CREATININE mg/dL 1.19  --   --  1.60*   GLUCOSE mg/dL 86  --   --    --    CALCIUM mg/dL 9.0  --   --   --    ALT (SGPT) U/L  --   --  16  --    AST (SGOT) U/L  --   --  21  --    TROPONIN T ng/mL  --  0.093* 0.055*  --      Estimated Creatinine Clearance: 40.7 mL/min (by C-G formula based on SCr of 1.19 mg/dL).    Microbiology Results Abnormal     None          Imaging Results (Last 24 Hours)     Procedure Component Value Units Date/Time    FL Video Swallow With Speech [[id number]] Collected:  12/10/19 1453     Updated:  12/10/19 1626    Narrative:       EXAMINATION: FL VIDEO SWALLOW W SPEECH-     INDICATION: dysphagia; R47.1-Dysarthria and anarthria; R53.1-Weakness;  Z74.09-Other reduced mobility     TECHNIQUE: 1 minute and 42 seconds of fluoroscopic time was used for  this exam. 1 associated image was saved. The patient was evaluated in  the seated lateral position while taking a variety of consistencies of  barium by mouth under the direction of speech pathology.     COMPARISON: NONE     FINDINGS: There was aspiration with sips of thin barium. There was no  penetration and no aspiration with nectar, or any of the barium with a  modified chin tuck. There was no penetration and no aspiration with  pudding, or solid consistency barium.          Impression:       Fluoroscopy provided for a modified barium swallow. Please  see speech therapy report for full details and recommendations.         This report was finalized on 12/10/2019 4:23 PM by Dr. Fernando Contreras.             Results for orders placed during the hospital encounter of 12/09/19   Adult Transesophageal Echo (BLADIMIR) W/ Cont if Necessary Per Protocol    Narrative · Technically difficult and limited study due to presence of hiatal   hernia.  · No evidence of left atrial appendage thrombus  · Bubble study was negative for right to left interatrial shunt. No   evidence PFO  · Trileaflet aortic valve without significant abnormality  · Mild grade 1 plaque in descending aorta and aortic arch  · No cardiac source of embolus identified  on this limited study          I have reviewed the medications:  Scheduled Meds:    aspirin 81 mg Oral Daily   Or      aspirin 300 mg Rectal Daily   atorvastatin 80 mg Oral Nightly   heparin (porcine) 5,000 Units Subcutaneous Q8H   sodium chloride 10 mL Intravenous Q12H   tamsulosin 0.4 mg Oral Daily     Continuous Infusions:   PRN Meds:.•  acetaminophen **OR** acetaminophen **OR** acetaminophen  •  ondansetron  •  sodium chloride  •  sodium chloride      Assessment/Plan   Assessment / Plan     Active Hospital Problems    Diagnosis  POA   • **Stroke (CMS/[facility]) [I63.9]  Yes   • Grade III diastolic dysfunction [I51.9]  Yes   • Elevated troponin [R79.89]  Yes   • Abnormal EKG [R94.31]  Yes   • Essential hypertension [I10]  Yes   • Dysarthria [R47.1]  Yes   • SALVADOR (acute kidney injury) (CMS/[facility]) [N17.9]  Yes      Resolved Hospital Problems   No resolved problems to display.        Brief Hospital Course to date:  Tho Kohli is a 89 y.o. male admitted with acute bilateral cerebral infarcts.    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  -Continue aspirin 81 mg, Lipitor 80 mg  -Neurology consulted  -Cardiology consulted  -Had BLADIMIR on 12/11/2019 without thrombus  -Worsening symptoms on 12/11/2019 possibly related to sedation from BLADIMIR    Right lower extremity swelling  -Duplex ultrasound bilaterally to rule out DVT is pending    Grade 2 diastolic dysfunction heart failure    Elevated troponin     Dysarthria and worsening dysphagia  -Thickened liquids and puréed diet    Acute urinary retention  -Started Flomax  -In and out cath per protocol as needed    DVT Prophylaxis: Heparin    CODE STATUS:   Code Status and Medical Interventions:   Ordered at: 12/09/19 1402     Limited Support to NOT Include:    Antiarrhythmic Drugs    Intubation     Level Of Support Discussed With:    Patient     Code Status:    No CPR     Medical Interventions (Level of Support Prior to Arrest):    Limited         Electronically signed by Siobhan SAENZ  MD Rafael, 12/11/19, 3:10 PM.

## 2019-12-11 NOTE — THERAPY RE-EVALUATION
Acute Care - Speech Language Pathology   Swallow Re-Evaluation [facility]     Patient Name: Tho Kohli  : 10/8/1927  MRN: [id number]  Today's Date: 2019               Admit Date: 2019    Visit Dx:     ICD-10-CM ICD-9-CM   1. Dysarthria R47.1 784.51   2. Left-sided weakness R53.1 728.87   3. Impaired mobility and ADLs Z74.09 799.89   4. Oropharyngeal dysphagia R13.12 787.22   5. 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 Acute CVA (cerebrovascular accident) (CMS/HCC) I63.9 434.91     Patient Active Problem List   Diagnosis   • Stroke (CMS/[facility])   • Essential hypertension   • Dysarthria   • SALVADOR (acute kidney injury) (CMS/[facility])   • Grade III diastolic dysfunction   • Elevated troponin   • Abnormal EKG     Past Medical History:   Diagnosis Date   • OAB (overactive bladder)      History reviewed. No pertinent surgical history.     SWALLOW EVALUATION (last 72 hours)      SLP Adult Swallow Evaluation     Row Name 19 1300 12/10/19 1420 19 1550             Rehab Evaluation    Document Type  re-evaluation  -AW  evaluation  -RD  evaluation  -MP      Subjective Information  fatigue  -AW  no complaints  -RD  no complaints  -MP      Patient Observations  lethargic  -AW  alert;cooperative;agree to therapy  -RD  alert;cooperative  -MP      Patient/Family Observations  son present  -AW  No family present  -RD  No family present  -MP      Patient Effort  fair  -AW  good  -RD  good  -MP         General Information    Patient Profile Reviewed  yes  -AW  yes  -RD  yes  -MP      Pertinent History Of Current Problem  --  Adm w/ Bilateral CVAs-embolic. Per stroke protocol. Failed RN dys screen. HTN, DH , SALVADOR, OAB. Lives @ assisted living facility, but independent w/ ADLs.   -RD  Adm  w/ suspected stroke. Imaging negative so far but MRI pending. Hx HTN, SDH 2007, OAB. Lives @ assisted living facility but independent w/ ADLs. Failed RN dysphagia screen 2' L facial droop.  -MP      Current  Method of Nutrition  pureed with some mashed;honey-thick liquids RN said MD wanted re-eval following EGD this am  -AW  mechanical soft, no mixed consistencies;nectar/syrup-thick liquids  -RD  NPO  -MP      Precautions/Limitations, Vision  --  corrective lenses needed for reading;vision impairment, bilaterally  -RD  WFL with corrective lenses  -MP      Precautions/Limitations, Hearing  --  WFL;for purposes of eval  -RD  WFL;for purposes of eval  -MP      Prior Level of Function-Communication  --  WFL  -RD  WFL  -MP      Prior Level of Function-Swallowing  --  no diet consistency restrictions  -RD  no diet consistency restrictions  -MP      Plans/Goals Discussed with  patient and family  -AW  patient;agreed upon  -RD  patient;agreed upon  -MP      Barriers to Rehab  medically complex  -AW  none identified  -RD  none identified  -MP      Patient's Goals for Discharge  --  eat/drink without coughing/choking  -RD  patient did not state  -MP      Family Goals for Discharge  other (see comments);declined (alternate means of nutrition/hydration)  maintain PO diet  -AW  --  --         Pain Assessment    Additional Documentation  --  Pain Scale: Numbers Pre/Post-Treatment (Group)  -RD  Pain Scale: FACES Pre/Post-Treatment (Group)  -MP         Pain Scale: Numbers Pre/Post-Treatment    Pain Scale: Numbers, Pretreatment  --  0/10 - no pain  -RD  --      Pain Scale: Numbers, Post-Treatment  --  0/10 - no pain  -RD  --         Pain Scale: FACES Pre/Post-Treatment    Pain: FACES Scale, Pretreatment  0-->no hurt  -AW  --  0-->no hurt  -MP      Pain: FACES Scale, Post-Treatment  0-->no hurt  -AW  --  0-->no hurt  -MP         Oral Motor and Function    Dentition Assessment  upper dentures/partial in place;lower dentures/partial in place  -AW  --  upper dentures/partial in place;lower dentures/partial in place  -MP      Secretion Management  wet vocal quality  -AW  --  WNL/WFL  -MP      Mucosal Quality  --  --  moist, healthy  -MP       Volitional Swallow  weak;delayed  -AW  --  --         Oral Musculature and Cranial Nerve Assessment    Oral Motor General Assessment  --  --  oral labial or buccal impairment  -MP      Oral Labial or Buccal Impairment, Detail, Cranial Nerve VII (Facial):  --  --  left labial droop  -MP      Oral Motor, Comment  --  --  Pt reports had surgical removal of skin cancer on L side of face resulting in damage of nerves and pt frequently has anterior loss on L side if not using straw  -MP         General Eating/Swallowing Observations    Eating/Swallowing Skills  fed by SLP  -AW  --  fed by SLP  -MP      Positioning During Eating  upright in bed  -AW  --  upright in bed  -MP      Utensils Used  spoon  -AW  --  spoon;cup;straw  -MP      Consistencies Trialed  honey-thick liquids;pureed  -AW  --  thin liquids;nectar/syrup-thick liquids;pureed;regular textures  -MP         Clinical Swallow Eval    Oral Prep Phase  WFL  -AW  --  impaired  -MP      Oral Transit  WFL  -AW  --  WFL  -MP      Oral Residue  WFL  -AW  --  WFL  -MP      Pharyngeal Phase  suspected pharyngeal impairment  -AW  --  suspected pharyngeal impairment  -MP      Esophageal Phase  -- known large hiatal hernia per son  -AW  --  --      Clinical Swallow Evaluation Summary  Re-eval completed. Pt with wet vocal quality and multiple swallows, which is c/w residue seen on MBS yestrday. Pt using chin tuck with all consistencies. Son understands that pt is at risk with all textures, but that if test is repeated it could show the same or worse results. He said they would defintely not want a feeding tube. Will keep on Honey thick, downgrade to full puree L2. We will monitor closely.   -AW  --  Clinical swallow evaluation completed. Pt given trials of ice chipx1, thin via tsp, NTL via tsp/cup/straw, puree, & regular solid. Immediate cough w/ ice & thin. No overt s/sxs w/ NTL, puree, or solid. Rec dysphagia level IV diet w/ NTL, meds in pudding/puree. Standard aspiration  precautions. SLP will proceed w/ MBS tomorrow 12/10 to further assess swallow function.  -MP         Oral Prep Concerns    Oral Prep Concerns  --  --  prolonged mastication  -MP      Prolonged Mastication  --  --  regular consistencies  -MP         Pharyngeal Phase Concerns    Pharyngeal Phase Concerns  wet vocal quality;multiple swallows  -AW  --  cough  -MP      Wet Vocal Quality  honey  -AW  --  --      Multiple Swallows  honey;pudding  -AW  --  --      Cough  --  --  thin  -MP         MBS/VFSS    Utensils Used  --  spoon;cup;straw  -RD  --      Consistencies Trialed  --  thin liquids;nectar/syrup-thick liquids;honey-thick liquids;pudding thick;regular textures  -RD  --         MBS/VFSS Interpretation    Oral Prep Phase  --  impaired oral phase of swallowing  -RD  --      Oral Transit Phase  --  impaired  -RD  --      Oral Residue  --  impaired  -RD  --         Oral Preparatory Phase    Oral Preparatory Phase  --  anterior loss;prolonged manipulation  -RD  --      Anterior Loss  --  all consistencies tested;secondary to reduced labial seal;other (see comments) somewhat at baseline 2' hx of lingual resection per pt  -RD  --      Prolonged Manipulation  --  pudding/puree;regular textures;secondary to reduced lingual strength;secondary to reduced lingual range of motion  -RD  --         Oral Transit Phase    Impaired Oral Transit Phase  --  increased A-P transit time;premature spillage of liquids into pharynx  -RD  --      Increased A-P Transit Time  --  pudding/puree;regular textures;secondary to reduced lingual control  -RD  --      Premature Spillage of Liquids into Pharynx  --  thin liquids;nectar-thick liquids;honey-thick liquids;secondary to reduced lingual control  -RD  --         Oral Residue    Impaired Oral Residue  --  lingual residue;lateral sulci residue;diffuse residue throughout oral cavity  -RD  --      Lingual Residue  --  all consistencies tested;secondary to reduced lingual strength;secondary to  reduced lingual range of motion  -RD  --      Lateral Sulci Residue  --  regular textures;secondary to reduced lingual strength;secondary to reduced lingual range of motion;other (see comments) able to mostly clear w/ cued lingual sweep- L lateral sulci  -RD  --      Diffuse Residue throughout Oral Cavity  --  all consistencies tested;secondary to reduced lingual strength;secondary to reduced lingual range of motion  -RD  --      Response to Oral Residue  --  with compensatory maneuver (see comments);other (see comments) mostly cleared w/ cued lingual sweep + re-swallow  -RD  --      Oral Residue, Comment  --  moderate oral residue (> w/ solids in L lateral sulci- mostly cleared w/ cued lingual sweep + re-swallow). Was losing residue anteriorly on L after the swallow. Best recs for Level 3-pureed w/ some mashed + L lingual sweep.   -RD  --         Initiation of Pharyngeal Swallow    Initiation of Pharyngeal Swallow  --  bolus in pyriform sinuses  -RD  --      Pharyngeal Phase  --  impaired pharyngeal phase of swallowing  -RD  --      Penetration During the Swallow  --  thin liquids;nectar-thick liquids;honey-thick liquids;pudding/puree;secondary to delayed swallow initiation or mistiming;secondary to reduced vestibular closure  -RD  --      Aspiration During the Swallow  --  thin liquids;nectar-thick liquids;honey-thick liquids;secondary to delayed swallow initiation or mistiming;secondary to reduced vestibular closure  -RD  --      Penetration After the Swallow  --  thin liquids;nectar-thick liquids;secondary to residue;in valleculae  -RD  --      Aspiration After the Swallow  --  thin liquids;nectar-thick liquids;honey-thick liquids;secondary to residue;in laryngeal vestibule  -RD  --      Response to Penetration  --  deep;no response  -RD  --      Response to Aspiration  --  no response, silent aspiration;response with cue only;could not clear subglottic material  -RD  --      Rosenbek's Scale  --   thin:;nectar:;honey:;8--->level 8  -RD  --      Pharyngeal Residue  --  all consistencies tested;base of tongue;valleculae;pyriform sinuses;posterior pharyngeal wall;laryngeal vestibule;diffuse within pharynx;secondary to reduced base of tongue retraction;secondary to reduced posterior pharyngeal wall stripping;other (see comments) reduced PES opening impacting  -RD  --      Response to Residue  --  cleared residue with compensatory maneuver (see comments);other (see comments) mostly  -RD  --      Attempted Compensatory Maneuvers  --  bolus size;bolus presentation style;chin tuck;multiple swallows;throat clear after swallow  -RD  --      Response to Attempted Compensatory Maneuvers  --  prevented aspiration;reduced residue  -RD  --      Pharyngeal Phase, Comment  --  Moderate-severe pharyngeal dysphagia. Penetration/aspiration during w/ continued aspiration of vestibular residue upon consecutive swallows w/ thins, nectar, and honey-thick liquids. Aspiration was silent. Pt was unable to clear aspiration even w/ cued cough. Penetration occurred x1 w/ pudding. Pre-spill+ delayed initiation + poor vestibular closure contributing to penetration/aspiration. Moderate-severe diffuse pharyngeal residue (> in valleculae and pooling to the pyriform sinuses) w/ all consistencies 2' reduced base of tongue retraction and decr'd pharyngeal stripping (suspect prominent inward curvature of cervical spine somewhat impacting). Multiple compensations attempted. Pt able to prevent aspiration and significantly reduce residue w/ small bites/single sips of honey-thick liquids via tsp/cup and w/ pudding/solids. Chin tuck attempted w/ nectar-thick, but still had deep penetration posing higher aspiration risk given mistiming. Pt would benefit from modified diet w/ compensations + oropharyngeal strengthening during treatment. Pt agreeable to recs. Pt able to teach back compensations w/ min cues x1, but given new memory deficits, will likely  need supervision to ensure use of compensations during meals at this time.   -RD  --         Esophageal Phase    Esophageal Phase  --  esophageal retention with retrograde flow through PES  -RD  --      Esophageal Phase, Comment  --  Prominent cricopharyngeus and reduced PES opening impacting. Pt would benefit from reflux precautions  -RD  --         Clinical Impression    SLP Swallowing Diagnosis  mild;oral dysfunction;pharyngeal dysfunction  -AW  moderate;oral dysfunction;mod-severe;pharyngeal dysfunction  -RD  suspected pharyngeal dysfunction  -MP      Functional Impact  risk of aspiration/pneumonia;risk of malnutrition;risk of dehydration  -AW  risk of aspiration/pneumonia  -RD  risk of aspiration/pneumonia  -MP      Rehab Potential/Prognosis, Swallowing  re-evaluate goals as necessary  -AW  good, to achieve stated therapy goals  -RD  good, to achieve stated therapy goals  -MP      Swallow Criteria for Skilled Therapeutic Interventions Met  demonstrates skilled criteria  -AW  demonstrates skilled criteria  -RD  demonstrates skilled criteria  -MP         Recommendations    Therapy Frequency (Swallow)  5 days per week  -AW  5 days per week  -RD  --      Predicted Duration Therapy Intervention (Days)  until discharge  -AW  until discharge  -RD  --      SLP Diet Recommendation  puree;honey thick liquids  -AW  puree with some mashed;honey thick liquids;other (see comments) supervision w/ meals to ensure compensations  -RD  mechanical soft with no mixed consistencies;nectar thick liquids  -MP      Recommended Diagnostics  --  --  VFSS (MBS)  -MP      Recommended Precautions and Strategies  chin Tuck  -AW  chin Tuck;upright posture during/after eating;small bites of food and sips of liquid;no straw;check mouth frequently for oral residue/pocketing;other (see comments) aspiration/reflux precautions; oral care; L lingual sweep  -RD  upright posture during/after eating;small bites of food and sips of liquid  -MP      SLP  Rec. for Method of Medication Administration  meds crushed;with pudding or applesauce  -AW  meds crushed;with pudding or applesauce + chin isamar  -RD  meds whole;meds crushed;with pudding or applesauce  -MP      Monitor for Signs of Aspiration  notify SLP if any concerns;yes  -AW  yes;notify SLP if any concerns  -RD  yes;notify SLP if any concerns  -MP      Anticipated Dischage Disposition  inpatient rehabilitation facility  -AW  inpatient rehabilitation facility;anticipate therapy at next level of care  -RD  unknown  -MP        User Key  (r) = Recorded By, (t) = Taken By, (c) = Cosigned By    Initials Name Effective Dates    AW Danielle Velazquez, MS CCC-SLP 06/22/15 -     Winnie Kaur, MS CCC-SLP 04/03/18 -     Stephan Boucher MS CCC-SLP 06/19/19 -           EDUCATION  The patient has been educated in the following areas:   Dysphagia (Swallowing Impairment).    SLP Recommendation and Plan  SLP Swallowing Diagnosis: mild, oral dysfunction, pharyngeal dysfunction  SLP Diet Recommendation: puree, honey thick liquids  Recommended Precautions and Strategies: chin Tuck  SLP Rec. for Method of Medication Administration: meds crushed, with pudding or applesauce     Monitor for Signs of Aspiration: notify SLP if any concerns, yes     Swallow Criteria for Skilled Therapeutic Interventions Met: demonstrates skilled criteria  Anticipated Dischage Disposition: inpatient rehabilitation facility  Rehab Potential/Prognosis, Swallowing: re-evaluate goals as necessary  Therapy Frequency (Swallow): 5 days per week  Predicted Duration Therapy Intervention (Days): until discharge       Plan of Care Reviewed With: patient, son  Progress: no change    SLP GOALS     Row Name 12/10/19 1903             Oral Nutrition/Hydration Goal 1 (SLP)    Oral Nutrition/Hydration Goal 1, SLP  LTG: Pt will return to regular diet and thin liquids w/ 100% accuracy w/o cues  -RD      Time Frame (Oral Nutrition/Hydration Goal 1, SLP)  by  discharge  -RD         Oral Nutrition/Hydration Goal 2 (SLP)    Oral Nutrition/Hydration Goal 2, SLP  Pt will tolerate level 3-pureed w/ some mashed and honey-thick liquids w/ compensations w/ no overt s/s of aspiration w/ 100% accuracy w/o cues  -RD      Time Frame (Oral Nutrition/Hydration Goal 2, SLP)  short term goal (STG);by discharge  -RD         Lingual Strengthening Goal 1 (SLP)    Activity (Lingual Strengthening Goal 1, SLP)  increase lingual tone/sensation/control/coordination/movement;increase tongue back strength  -RD      Increase Lingual Tone/Sensation/Control/Coordination/Movement  lingual movement exercises  -RD      Increase Tongue Back Strength  lingual resistance exercises  -RD      [place]/Accuracy (Lingual Strengthening Goal 1, SLP)  with minimal cues (75-90% accuracy)  -RD      Time Frame (Lingual Strengthening Goal 1, SLP)  short term goal (STG);by discharge  -RD         Pharyngeal Strengthening Exercise Goal 1 (SLP)    Activity (Pharyngeal Strengthening Goal 1, SLP)  increase timing;increase closure at entrance to airway/closure of airway at glottis;increase squeeze/positive pressure generation;increase tongue base retraction;increase PES opening  -RD      Increase Timing  prepping - 3 second prep or suck swallow or 3-step swallow  -RD      Increase Closure at Entrance to Airway/Closure of Airway at Glottis  super-supraglottic swallow  -RD      Increase Squeeze/Positive Pressure Generation  effortful pitch glide (falsetto + pharyngeal squeeze)  -RD      Increase Tongue Base Retraction  hard effortful swallow;williams  -RD      Increase PES Opening  chin tuck against resistance (CTAR)  -RD      [place]/Accuracy (Pharyngeal Strengthening Goal 1, SLP)  with minimal cues (75-90% accuracy)  -RD      Time Frame (Pharyngeal Strengthening Goal 1, SLP)  short term goal (STG);by discharge  -RD         Swallow Management Recall Goal 1 (SLP)    Activity (Swallow Management Recall Goal 1, SLP)   recall of;safe diet level/texture;safe liquid viscosity;compensatory swallow strategies/techniques;postural techniques;rationale for use of strategies/techniques  -RD      [place]/Accuracy (Swallow Management Recall Goal 1, SLP)  with minimal cues (75-90% accuracy)  -RD      Time Frame (Swallow Management Recall Goal 1, SLP)  short term goal (STG);by discharge  -RD         Swallow Compensatory Strategies Goal 1 (SLP)    Activity (Swallow Compensatory Strategies/Techniques Goal 1, SLP)  compensatory strategies;postural techniques;during meal intake;small bites;small cup sips;chin tuck posture;other (see comments) L lingual sweep w/ solids  -RD      [place]/Accuracy (Swallow Compensatory Strategies/Techniques Goal 1, SLP)  independently (over 90% accuracy)  -RD      Time Frame (Swallow Compensatory Strategies/Techniques Goal 1, SLP)  short term goal (STG);by discharge  -RD         Phonation Goal 1 (SLP)    Improve Phonation By Goal 1 (SLP)  using loud speech;80%;with minimal cues (75-90%)  -RD      Time Frame (Phonation Goal 1, SLP)  short term goal (STG);by discharge  -RD         Reduce Perception of Hypernasality Goal 1 (SLP)    Reduce Perception of Hypernasality By Goal 1 (SLP)  opening mouth wider for speech;80%;with minimal cues (75-90%)  -RD      Time Frame (Perception of Hypernasality Goal 1, SLP)  short term goal (STG);by discharge  -RD         Articulation Goal 1 (SLP)    Improve Articulation Goal 1 (SLP)  of specific sounds in connected speech;by over-articulating in connected speech;80%;with minimal cues (75-90%)  -RD      Time Frame (Articulation Goal 1, SLP)  short term goal (STG);by discharge  -RD         Memory Skills Goal 1 (SLP)    Improve Memory Skills Through Goal 1 (SLP)  recalling unrelated word lists with an imposed delay;80%;with minimal cues (75-90%)  -RD      Time Frame (Memory Skills Goal 1, SLP)  short term goal (STG);by discharge  -RD         Executive Functional Skills Goal 1  (SLP)    Improve Executive Function Skills Goal 1 (SLP)  demonstrate awareness of deficit;identify strategies, strengths, limitations;identify anticipated needs;80%;with minimal cues (75-90%)  -RD      Time Frame (Executive Function Skills Goal 1, SLP)  short term goal (STG);by discharge  -RD         Additional Goal 1 (SLP)    Additional Goal 1, SLP  LTG: Pt will improve cognitive-communication skills to actively participate in care w/ 100% accuracy w/o cues.   -RD      Time Frame (Additional Goal 1, SLP)  by discharge  -RD        User Key  (r) = Recorded By, (t) = Taken By, (c) = Cosigned By    Initials Name Provider Type    Winnie Kaur MS CCC-SLP Speech and Language Pathologist             Time Calculation:   Time Calculation- SLP     Row Name 12/11/19 1333             Time Calculation- SLP    SLP Start Time  1230  -AW      SLP Received On  12/11/19  -AW        User Key  (r) = Recorded By, (t) = Taken By, (c) = Cosigned By    Initials Name Provider Type    AW Danielle Velazquez MS CCC-SLP Speech and Language Pathologist          Therapy Charges for Today     Code Description Service Date Service Provider Modifiers Qty    [id number] HC ST EVAL ORAL PHARYNG SWALLOW 3 12/11/2019 Danielle Velazquez MS CCC-SLP GN 1               Danielle Velazquez MS CCC-SLP  12/11/2019

## 2019-12-11 NOTE — PLAN OF CARE
Alert and oriented, pleasant and cooperative , NIH 4 dysarthric, generalized weakness r/t advanced age, hematuria noted, permissive hypertension

## 2019-12-11 NOTE — PROGRESS NOTES
Cardiology update:    BLADIMIR performed: Limited study secondary to patient's hiatal hernia.  Limited views obtained    No evidence of left atrial appendage thrombus, no significant aortic atheroma, bubble study negative.  Aortic valve without significant abnormality.  Mitral valve was not well visualized    No immediate complications noted

## 2019-12-11 NOTE — PROGRESS NOTES
Continued Stay Note  [facility]     Patient Name: Tho Kohli  MRN: [id number]  Today's Date: 12/11/2019    Admit Date: 12/9/2019    Discharge Plan     Row Name 12/11/19 1546       Plan    Plan  Inpatient rehab    Provided post acute provider list?  Yes    Plan Comments  Spoke with patient and son at bedside.  Patient has PT/OT recs. for inpatient rehab, and he is agreeable to [facility].  Spoke with Latrice at [facility], and she will begin reviewing patient for acute rehab.  Attempted to call Ken (liason at [facility]) to update on patient status today at [phone].  No answer.  Will attempt again at later date.  Information provided to son for [facility] for application process and long term planning.  Family is aware that facility has waiting list, but understands that they can start application online with information provided.  Case management will continue to follow for discharge planning.      Final Discharge Disposition Code  62 - inpatient rehab facility        Discharge Codes    No documentation.       Expected Discharge Date and Time     Expected Discharge Date Expected Discharge Time    Dec 13, 2019             Elvira Pearson RN

## 2019-12-11 NOTE — PROGRESS NOTES
"Neurology       Patient Care Team:  Daniel Adams MD as PCP - General (Family Medicine)    Chief complaint: Stroke    History: Patient is sedated post that BLADIMIR.      Past Medical History:   Diagnosis Date   • OAB (overactive bladder)        Vital Signs   Vitals:    12/11/19 1029 12/11/19 1035 12/11/19 1038 12/11/19 1208   BP: (!) 169/118 (!) 163/110 (!) 156/109    BP Location:       Patient Position:       Pulse: 96 96 98    Resp: 22 22 24    Temp:       TempSrc:       SpO2: 96% 95% 97%    Weight:    72.6 kg (160 lb)   Height:    180.3 cm (71\")       Physical Exam:   General: Sleepy but easily aroused.    Speech is still slurred.    His face is more mobile than yesterday.    Skin is stronger  on the left than previously.                  Neuro: Sleepy secondary to premeds for TPA    Results Review:  BLADIMIR shows no evidence of thrombus and negative bubble study  Results from last 7 days   Lab Units 12/09/19  1136   WBC 10*3/mm3 8.83   HEMOGLOBIN g/dL 12.9*   HEMATOCRIT % 39.6   PLATELETS 10*3/mm3 197     Results from last 7 days   Lab Units 12/10/19  0513 12/09/19  1136 12/09/19  1131   SODIUM mmol/L 139  --   --    POTASSIUM mmol/L 3.6  --   --    CHLORIDE mmol/L 102  --   --    CO2 mmol/L 25.0  --   --    BUN mg/dL 23  --   --    CREATININE mg/dL 1.19  --  1.60*   CALCIUM mg/dL 9.0  --   --    ALT (SGPT) U/L  --  16  --    AST (SGOT) U/L  --  21  --    GLUCOSE mg/dL 86  --   --        Imaging Results (Last 24 Hours)     Procedure Component Value Units Date/Time    FL Video Swallow With Speech [[id number]] Collected:  12/10/19 1453     Updated:  12/10/19 1626    Narrative:       EXAMINATION: FL VIDEO SWALLOW W SPEECH-     INDICATION: dysphagia; R47.1-Dysarthria and anarthria; R53.1-Weakness;  Z74.09-Other reduced mobility     TECHNIQUE: 1 minute and 42 seconds of fluoroscopic time was used for  this exam. 1 associated image was saved. The patient was evaluated in  the seated lateral position while taking a " variety of consistencies of  barium by mouth under the direction of speech pathology.     COMPARISON: NONE     FINDINGS: There was aspiration with sips of thin barium. There was no  penetration and no aspiration with nectar, or any of the barium with a  modified chin tuck. There was no penetration and no aspiration with  pudding, or solid consistency barium.          Impression:       Fluoroscopy provided for a modified barium swallow. Please  see speech therapy report for full details and recommendations.         This report was finalized on 12/10/2019 4:23 PM by Dr. Fernando Contreras.             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 Calle.  Certainly would be reasonable to have him on aspirin and do an outpatient monitor or start anticoagulation if he feels that is appropriate in this context    Comment:  Guarded prognosis         I discussed the patients findings and my recommendations with patient    Joey Javier MD  12/11/19  1:06 PM

## 2019-12-11 NOTE — PROGRESS NOTES
"                  Clinical Nutrition     Reason for Visit:   Identified at risk by screening criteria, Difficulty chewing/swallowing, MST score 2+    Patient Name: Tho Kohli  YOB: 1927  MRN: [id number]  Date of Encounter: 12/11/19 1:14 PM  Admission date: 12/9/2019    Nutrition Assessment   Assessment     Admission diagnosis  CVA    Additional diagnosis/conditions/procedures this admission  RLE swelling, ? DVT  Acute urinary retention    Additional PMH/procedures:  HTN  Overactive bladder  SDH    Reported/Observed/Food/Nutrition Related History:      Patient off of floor at initial visit. Follow up, MD in with patient following BLADIMIR procedure. RD notes per weight history, pt weight ~165 lbs for the past couple months per Care Everywhere. Patient with swallowing difficulties as modified diet/liquids recommend per SLP.      Anthropometrics     Height: 180.3 cm (71\")  Last filed wt: Weight: 72.6 kg (160 lb) (12/11/19 1208)  Weight Method: Bed scale    BMI: BMI (Calculated): 22.3  Normal: 18.5-24.9kg/m2    Ideal Body Weight (IBW) (kg): 79.27  Admission wt: 160 lbs  Method obtained: bed scale weight per charting 12/9    Weight Change   UBW: 160 - 164 lbs per EMR  Weight change:   % wt change:   Time frame of weight loss:     12/02/2019 09:20AM SICK VISIT  Height Weight BMI   71 in 165 lbs 2 oz 23 kg/m2      11/13/2019 01:30PM SICK VISIT  Height Weight BMI   71 in 164 lbs 6 oz 22.9 kg/m2      10/23/2019 04:15PM SICK VISIT  Height Weight BMI   71 in 165 lbs 2 oz 23 kg/m2      09/20/2019 01:15PM NEW PATIENT  Height Weight BMI   71 in 160 lbs 16 oz 22.5 kg/m2      Labs reviewed     Results from last 7 days   Lab Units 12/10/19  0513 12/09/19  1136 12/09/19  1131   GLUCOSE mg/dL 86  --   --    BUN mg/dL 23  --   --    CREATININE mg/dL 1.19  --  1.60*   SODIUM mmol/L 139  --   --    CHLORIDE mmol/L 102  --   --    POTASSIUM mmol/L 3.6  --   --    ALT (SGPT) U/L  --  16  --      Results from last 7 days "   Lab Units 12/10/19  0513   CHOLESTEROL mg/dL 133   TRIGLYCERIDES mg/dL 46       Results from last 7 days   Lab Units 12/10/19  0553 12/09/19  2353 12/09/19  1949 12/09/19  1449 12/09/19  1131   GLUCOSE mg/dL 89 102 173* 100 100     Lab Results   Lab Value Date/Time    HGBA1C 5.30 12/10/2019 0513       Medications reviewed   Pertinent: aspirin      Current Nutrition Prescription     PO: Diet Pureed; Honey Thick; Cardiac  Intake: insuff data    Nutrition Diagnosis     12/11  Problem Predicted suboptimal energy intake   Etiology Dysphagia / modified diet   Signs/Symptoms Minimal PO documentation on modified diet       12/11  Problem Swallowing difficulty   Etiology Dysphagia   Signs/Symptoms SLP 12/10 - puree some mashed       Nutrition Intervention   1.  Follow treatment progress, Care plan reviewed  2.  Supplement provided - Boost Pudding x2/d  3. RD to continue to clarify weight / intake history with patient as available    Goal:   General: Nutrition support treatment  PO: Establish PO      Monitoring/Evaluation:   Per protocol, PO intake, Supplement intake, Weight, POC/GOC, Swallow function    Will Continue to follow per protocol    Mercedes Mon RDN, LD  Time Spent: 30 minutes

## 2019-12-12 PROCEDURE — 25010000002 CEFTRIAXONE PER 250 MG: Performed by: INTERNAL MEDICINE

## 2019-12-12 PROCEDURE — 92526 ORAL FUNCTION THERAPY: CPT

## 2019-12-12 PROCEDURE — 92507 TX SP LANG VOICE COMM INDIV: CPT

## 2019-12-12 PROCEDURE — 97116 GAIT TRAINING THERAPY: CPT

## 2019-12-12 PROCEDURE — 97110 THERAPEUTIC EXERCISES: CPT

## 2019-12-12 PROCEDURE — 97530 THERAPEUTIC ACTIVITIES: CPT

## 2019-12-12 PROCEDURE — 99232 SBSQ HOSP IP/OBS MODERATE 35: CPT | Performed by: PSYCHIATRY & NEUROLOGY

## 2019-12-12 PROCEDURE — 25010000002 HEPARIN (PORCINE) PER 1000 UNITS: Performed by: FAMILY MEDICINE

## 2019-12-12 PROCEDURE — 99233 SBSQ HOSP IP/OBS HIGH 50: CPT | Performed by: INTERNAL MEDICINE

## 2019-12-12 RX ADMIN — HEPARIN SODIUM 5000 UNITS: 5000 INJECTION, SOLUTION INTRAVENOUS; SUBCUTANEOUS at 14:09

## 2019-12-12 RX ADMIN — APIXABAN 2.5 MG: 2.5 TABLET, FILM COATED ORAL at 20:31

## 2019-12-12 RX ADMIN — SODIUM CHLORIDE, PRESERVATIVE FREE 10 ML: 5 INJECTION INTRAVENOUS at 08:36

## 2019-12-12 RX ADMIN — CEFTRIAXONE 1 G: 1 INJECTION, POWDER, FOR SOLUTION INTRAMUSCULAR; INTRAVENOUS at 11:56

## 2019-12-12 RX ADMIN — HEPARIN SODIUM 5000 UNITS: 5000 INJECTION, SOLUTION INTRAVENOUS; SUBCUTANEOUS at 06:16

## 2019-12-12 RX ADMIN — TAMSULOSIN HYDROCHLORIDE 0.4 MG: 0.4 CAPSULE ORAL at 08:35

## 2019-12-12 RX ADMIN — ASPIRIN 300 MG: 300 SUPPOSITORY RECTAL at 08:36

## 2019-12-12 RX ADMIN — SODIUM CHLORIDE, PRESERVATIVE FREE 10 ML: 5 INJECTION INTRAVENOUS at 20:31

## 2019-12-12 RX ADMIN — ATORVASTATIN CALCIUM 80 MG: 40 TABLET, FILM COATED ORAL at 20:30

## 2019-12-12 NOTE — PLAN OF CARE
Problem: Patient Care Overview  Goal: Plan of Care Review  Outcome: Ongoing (interventions implemented as appropriate)  Flowsheets (Taken 12/12/2019 2012)  Plan of Care Reviewed With: patient  Note:   SLP treatment completed. Will continue to address dysphagia and cognition/communication in treatment. Please see note for further details and recommendations.

## 2019-12-12 NOTE — PLAN OF CARE
Problem: Patient Care Overview  Goal: Plan of Care Review  Outcome: Ongoing (interventions implemented as appropriate)  Flowsheets (Taken 12/12/2019 1510)  Plan of Care Reviewed With: patient  Outcome Summary: PT GIVES GOOD EFFORT AND IS MOTIVATED TO WORK WITH P.T. AMBULATED 38 FEET WITH MOD ASSIST AND R WALKER. PERFORMED STS X 4 REPS. NEEDS CUES FOR POSTURE AND INCREASED STEP LENGTH DURING GAIT. RECOMMEND IRF AT D/C.

## 2019-12-12 NOTE — PROGRESS NOTES
Continued Stay Note  [facility]     Patient Name: Tho Kohli  MRN: [id number]  Today's Date: 12/12/2019    Admit Date: 12/9/2019    Discharge Plan     Row Name 12/12/19 1533       Plan    Plan  Inpatient rehab at [facility]    Patient/Family in Agreement with Plan  yes    Plan Comments  Spoke with patient and family at bedside.  Patient nearing medical readiness for transfer to rehab.  Continuing to monitor urine for hematuria.  Plans for transfer to [facility] on Saturday 12/14 with family to transport.  Per Latrice at [facility], she will call on 12/13 with bed assignment for patient.  Will continue to follow for discharge planning.     Final Discharge Disposition Code  62 - inpatient rehab facility        Discharge Codes    No documentation.       Expected Discharge Date and Time     Expected Discharge Date Expected Discharge Time    Dec 13, 2019             Elvira Pearson RN

## 2019-12-12 NOTE — PLAN OF CARE
Medication compliance is the most important factor along with diet and exercise in prevention  of strokes, chin tucks well, very pleasant A&Ox4, NIH 3, limited mobility, hematuria continues, as well as urinary retention

## 2019-12-12 NOTE — PLAN OF CARE
Problem: Patient Care Overview  Goal: Plan of Care Review  Flowsheets  Taken 12/12/2019 1839 by Cori Casas RN  Progress: no change  Outcome Summary: VSS. Pt denies pain. NIHSS score today was 3. Heparin discontinued and eliquis ordered. Rocephin ordered for UTI. Pt is pleasant and compliant with care.  Taken 12/12/2019 1628 by Catalina Nichols MS CCC-SLP  Plan of Care Reviewed With: patient

## 2019-12-12 NOTE — THERAPY TREATMENT NOTE
Patient Name: Tho Kohli  : 10/8/1927    MRN: [id number]                              Today's Date: 2019       Admit Date: 2019    Visit Dx:     ICD-10-CM ICD-9-CM   1. Dysarthria R47.1 784.51   2. Left-sided weakness R53.1 728.87   3. Impaired mobility and ADLs Z74.09 799.89   4. Oropharyngeal dysphagia R13.12 787.22   5. 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 Acute CVA (cerebrovascular accident) (CMS/Formerly [facility]) I63.9 434.91     Patient Active Problem List   Diagnosis   • Stroke (CMS/[facility])   • Essential hypertension   • Dysarthria   • SALVADOR (acute kidney injury) (CMS/[facility]ly [facility])   • Grade III diastolic dysfunction   • Elevated troponin   • Abnormal EKG     Past Medical History:   Diagnosis Date   • OAB (overactive bladder)      History reviewed. No pertinent surgical history.  General Information     Row Name 19 1502          PT Evaluation Time/Intention    Document Type  therapy note (daily note)  -CD     Mode of Treatment  physical therapy  -CD     Row Name 19 1502          General Information    Patient Profile Reviewed?  yes  -CD     Existing Precautions/Restrictions  fall  -CD     Barriers to Rehab  none identified  -CD     Row Name 19 1502          Safety Issues, Functional Mobility    Safety Issues Affecting Function (Mobility)  insight into deficits/self awareness;safety precaution awareness;safety precautions follow-through/compliance  -CD     Impairments Affecting Function (Mobility)  balance;endurance/activity tolerance;strength;coordination  -CD       User Key  (r) = Recorded By, (t) = Taken By, (c) = Cosigned By    Initials Name Provider Type    CD Loly Corea, PT Physical Therapist        Mobility     Row Name 19 1508          Bed Mobility Assessment/Treatment    Bed Mobility Assessment/Treatment  rolling left;sidelying-sit  -CD     Rolling Left [place] (Bed Mobility)  verbal cues;minimum assist (75% patient effort)  -CD     Sidelying-Sit [place]  (Bed Mobility)  moderate assist (50% patient effort);2 person assist;verbal cues  -CD     Comment (Bed Mobility)  CUES FOR SEQUENCING.   -CD     Row Name 12/12/19 1503          Transfer Assessment/Treatment    Comment (Transfers)  CUES FOR HAND PLACEMENT, SEQUENCING WITH STS TRANSFERS. PT HAD BM UPON STANDING 1ST REP. STOOD 3 MORE TIMES (2X FROM EOB TO COMPLETE HYGIENE AFTER BM/DUNIA DEPENDS AND 1X FROM RECLINER AFTER REST BREAK DURING GAIT IN [place].   -CD     Row Name 12/12/19 1503          Sit-Stand Transfer    Sit-Stand [place] (Transfers)  moderate assist (50% patient effort);verbal cues  -CD     Assistive Device (Sit-Stand Transfers)  walker, front-wheeled  -CD     Row Name 12/12/19 1503          Gait/Stairs Assessment/Training    Gait/Stairs Assessment/Training  gait/ambulation independence  -CD     [place] Level (Gait)  moderate assist (50% patient effort);verbal cues  -CD     Assistive Device (Gait)  walker, front-wheeled  -CD     Distance in Feet (Gait)  38 FEET WITH ONE SITTING REST BREAK.   -CD     Pattern (Gait)  step-to;step-through  -CD     Deviations/Abnormal Patterns (Gait)  bilateral deviations;stride length decreased;gait speed decreased;nori decreased;ataxic  -CD     Bilateral Gait Deviations  forward flexed posture;heel strike decreased;weight shift ability decreased  -CD     Comment (Gait/Stairs)  CUES TO INCREASE STEP LENGTH AND FOR UPRIGHT POSTURE. MANUAL ASSIST FOR WT SHIFT, LEANS TO THE LEFT. FOLLOWED CLOSELY WITH RECLINER.   -CD       User Key  (r) = Recorded By, (t) = Taken By, (c) = Cosigned By    Initials Name Provider Type    CD Loly Corea PT Physical Therapist        Obj/Interventions     Row Name 12/12/19 1508          Therapeutic Exercise    Lower Extremity (Therapeutic Exercise)  marching while seated;LAQ (long arc quad), bilateral  -CD     Lower Extremity Range of Motion (Therapeutic Exercise)  ankle dorsiflexion/plantar flexion, bilateral  -CD     Row Name 12/12/19  1508          Static Sitting Balance    Level of [place] (Unsupported Sitting, Static Balance)  contact guard assist  -CD     Sitting Position (Unsupported Sitting, Static Balance)  sitting on edge of bed  -CD     Time Able to Maintain Position (Unsupported Sitting, Static Balance)  more than 5 minutes  -CD     Row Name 12/12/19 1508          Dynamic Sitting Balance    Level of [place], Reaches Outside Midline (Sitting, Dynamic Balance)  minimal assist, 75% patient effort  -CD     Sitting Position, Reaches Outside Midline (Sitting, Dynamic Balance)  sitting on edge of bed  -CD     Comment, Reaches Outside Midline (Sitting, Dynamic Balance)  SCOOTING AT EOB PRIOR TO STANDING.   -CD     Row Name 12/12/19 1508          Static Standing Balance    Level of [place] (Supported Standing, Static Balance)  minimal assist, 75% patient effort  -CD     Assistive Device Utilized (Supported Standing, Static Balance)  walker, rolling  -CD     Row Name 12/12/19 1508          Dynamic Standing Balance    Level of [place], Reaches Outside Midline (Standing, Dynamic Balance)  moderate assist, 50 to 74% patient effort;2 person assist  -CD     Assistive Device Utilized (Supported Standing, Dynamic Balance)  walker, rolling  -CD     Comment, Reaches Outside Midline (Standing, Dynamic Balance)  GAIT IN [place].   -CD       User Key  (r) = Recorded By, (t) = Taken By, (c) = Cosigned By    Initials Name Provider Type    Loly Baldwin PT Physical Therapist        Goals/Plan    No documentation.       Clinical Impression     Row Name 12/12/19 1510          Pain Scale: Numbers Pre/Post-Treatment    Pain Scale: Numbers, Pretreatment  0/10 - no pain  -CD     Pain Scale: Numbers, Post-Treatment  0/10 - no pain  -CD     Row Name 12/12/19 1513          Plan of Care Review    Plan of Care Reviewed With  patient  -CD     Outcome Summary  PT GIVES GOOD EFFORT AND IS MOTIVATED TO WORK WITH P.T. AMBULATED 38 FEET WITH MOD ASSIST AND  DANY PORTER. PERFORMED STS X 4 REPS. NEEDS CUES FOR POSTURE AND INCREASED STEP LENGTH DURING GAIT. RECOMMEND IRF AT D/C.   -CD     Row Name 12/12/19 1510          Physical Therapy Clinical Impression    Patient/Family Goals Statement (PT Clinical Impression)  DID NOT STATE.   -CD     Criteria for Skilled Interventions Met (PT Clinical Impression)  yes;treatment indicated  -CD     Rehab Potential (PT Clinical Summary)  good, to achieve stated therapy goals  -CD     Row Name 12/12/19 1510          Vital Signs    Pre Systolic BP Rehab  -- VSS. NSG CLEARED FOR TREATMENT.   -CD     Pre Patient Position  Supine  -CD     Intra Patient Position  Standing  -CD     Post Patient Position  Sitting  -CD     Row Name 12/12/19 1510          Positioning and Restraints    Pre-Treatment Position  in bed  -CD     In Chair  notified nsg;reclined;call light within reach;encouraged to call for assist;legs elevated;RUE elevated;LUE elevated;exit alarm on NSG NOTIFIED OF MOBILITY STATUS AND PT WEARING DEPENDS DUE TO INCONTINENT OF BM DURING MOBILITY. NSG OK'D PT TO HAVE THICKENED PUDDING AND THICKENED MILK UNSUPERVISED.   -CD       User Key  (r) = Recorded By, (t) = Taken By, (c) = Cosigned By    Initials Name Provider Type    CD Loly Corea, PT Physical Therapist        Outcome Measures     Row Name 12/12/19 4668          How much help from another person do you currently need...    Turning from your back to your side while in flat bed without using bedrails?  2  -CD     Moving from lying on back to sitting on the side of a flat bed without bedrails?  2  -CD     Moving to and from a bed to a chair (including a wheelchair)?  2  -CD     Standing up from a chair using your arms (e.g., wheelchair, bedside chair)?  2  -CD     Climbing 3-5 steps with a railing?  1  -CD     To walk in hospital room?  2  -CD     AM-PAC 6 Clicks Score (PT)  11  -CD     Row Name 12/12/19 9858          Modified [place] Scale    Modified [place] Scale  4 - Moderately  severe disability.  Unable to walk without assistance, and unable to attend to own bodily needs without assistance.  -CD     Row Name 12/12/19 1516          Functional Assessment    Outcome Measure Options  AM-PAC 6 Clicks Basic Mobility (PT);Modified [place]  -CD       User Key  (r) = Recorded By, (t) = Taken By, (c) = Cosigned By    Initials Name Provider Type    Loly Baldwin PT Physical Therapist          PT Recommendation and Plan     Outcome Summary/Treatment Plan (PT)  Anticipated Discharge Disposition (PT): inpatient rehabilitation facility  Plan of Care Reviewed With: patient  Outcome Summary: PT GIVES GOOD EFFORT AND IS MOTIVATED TO WORK WITH P.T. AMBULATED 38 FEET WITH MOD ASSIST AND R WALKER. PERFORMED STS X 4 REPS. NEEDS CUES FOR POSTURE AND INCREASED STEP LENGTH DURING GAIT. RECOMMEND IRF AT D/C.      Time Calculation:   PT Charges     Row Name 12/12/19 1517             Time Calculation    Start Time  1400  -CD      PT Received On  12/12/19  -CD      PT Goal Re-Cert Due Date  12/20/19  -CD         Time Calculation- PT    Total Timed Code Minutes- PT  50 minute(s)  -CD         Timed Charges    57018 - PT Therapeutic Exercise Minutes  15  -CD      24760 - Gait Training Minutes   15  -CD      61547 - PT Therapeutic Activity Minutes  20  -CD        User Key  (r) = Recorded By, (t) = Taken By, (c) = Cosigned By    Initials Name Provider Type    Loly Baldwin PT Physical Therapist        Therapy Charges for Today     Code Description Service Date Service Provider Modifiers Qty    93945465564 HC PT THER PROC EA 15 MIN 12/12/2019 Loly Corea, PT GP 1    34064910117 HC GAIT TRAINING EA 15 MIN 12/12/2019 Loly Corea, PT GP 1    37212852650 HC PT THERAPEUTIC ACT EA 15 MIN 12/12/2019 Loly Corea, PT GP 1    69491509801 HC PT THER SUPP EA 15 MIN 12/12/2019 Loly Corea, PT GP 4          PT G-Codes  Outcome Measure Options: AM-PAC 6 Clicks Basic Mobility (PT), Modified [place]  AM-PAC 6 Clicks  Score (PT): 11  AM-PAC 6 Clicks Score (OT): 14  Modified [place] Scale: 4 - Moderately severe disability.  Unable to walk without assistance, and unable to attend to own bodily needs without assistance.    Loly Corea, PT  12/12/2019

## 2019-12-12 NOTE — PROGRESS NOTES
Neurology       Patient Care Team:  Daniel Adams MD as PCP - General (Family Medicine)    Chief complaint: Stroke    History: Patient continues to improve.    He still is somewhat fatigued but is talking better and swallowing with less difficulty.  He is also pulling himself over in the bed with his left arm.      Past Medical History:   Diagnosis Date   • OAB (overactive bladder)        Vital Signs   Vitals:    12/12/19 0300 12/12/19 0326 12/12/19 0816 12/12/19 1148   BP: (!) 135/109 136/96 157/93 131/86   BP Location: Right arm  Right arm Right arm   Patient Position: Lying  Lying Lying   Pulse: 92  95 88   Resp: 20  20 20   Temp: 98.5 °F (36.9 °C)  98.3 °F (36.8 °C) 97.7 °F (36.5 °C)   TempSrc: Oral  Oral Oral   SpO2: 96%  94% 97%   Weight:       Height:           Physical Exam:   General: Awake and alert but falls back to sleep in the middle of the interview              Neuro: Oriented to person place and time.    Speech is dysarthric but less so than previously.    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    Upper extremity on the left seems to have much better  and is antigravity with only minimal pronator drift.        Results Review:  Reviewed  Results from last 7 days   Lab Units 12/09/19  1136   WBC 10*3/mm3 8.83   HEMOGLOBIN g/dL 12.9*   HEMATOCRIT % 39.6   PLATELETS 10*3/mm3 197     Results from last 7 days   Lab Units 12/10/19  0513 12/09/19  1136 12/09/19  1131   SODIUM mmol/L 139  --   --    POTASSIUM mmol/L 3.6  --   --    CHLORIDE mmol/L 102  --   --    CO2 mmol/L 25.0  --   --    BUN mg/dL 23  --   --    CREATININE mg/dL 1.19  --  1.60*   CALCIUM mg/dL 9.0  --   --    ALT (SGPT) U/L  --  16  --    AST (SGOT) U/L  --  21  --    GLUCOSE mg/dL 86  --   --        Imaging Results (Last 24 Hours)     ** No results found for the last 24 hours. **          Assessment:  Embolic strokes, likely paroxysmal atrial fibrillation    Plan:  Plan to discuss with Dr. Genesis em  anticoagulation.    Spoke to the patient and his son regarding the risk-benefit of that given his previous history of subdural hematoma.    Comment:  Although there are risks with the full anticoagulation in elderly man such as this, the danger of not given the severity of the strokes that potentially can occur. seem to make the wrist justified         I discussed the patients findings and my recommendations with patient, family and primary care team    Joey Javier MD  12/12/19  1:58 PM

## 2019-12-12 NOTE — THERAPY TREATMENT NOTE
Acute Care - Speech Language Pathology Treatment Note  Psychiatric     Patient Name: Tho Kohli  : 10/8/1927  MRN: [id number]  Today's Date: 2019         Admit Date: 2019    Visit Dx:      ICD-10-CM ICD-9-CM   1. Dysarthria R47.1 784.51   2. Left-sided weakness R53.1 728.87   3. Impaired mobility and ADLs Z74.09 799.89   4. Oropharyngeal dysphagia R13.12 787.22   5. 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 Acute CVA (cerebrovascular accident) (CMS/Formerly [facility]) I63.9 434.91     Patient Active Problem List   Diagnosis   • Stroke (CMS/[facility]ly [facility])   • Essential hypertension   • Dysarthria   • SALVADOR (acute kidney injury) (CMS/Formerly [facility])   • Grade III diastolic dysfunction   • Elevated troponin   • Abnormal EKG        Therapy Treatment  Rehabilitation Treatment Summary     Row Name 19 1515             Treatment Time/Intention    Discipline  speech language pathologist  -      Document Type  therapy note (daily note)  -      Subjective Information  no complaints  -      Mode of Treatment  speech-language pathology  -      Patient/Family Observations  No family present  -      Therapy Frequency (Swallow)  5 days per week  -      Therapy Frequency (SLP SLC)  5 days per week  -      Patient Effort  good  -      Recorded by [] Catalina Nichols MS CCC-SLP 19 1617      Row Name 19 1515             Outcome Summary/Treatment Plan (SLP)    Daily Summary of Progress (SLP)  progress toward functional goals as expected  -      Plan for Continued Treatment (SLP)  Continue to follow to address dysphagia,cognition and communication  -      Anticipated Dischage Disposition  unknown;anticipate therapy at next level of care  -      Recorded by [CH] Catalina Nichols MS CCC-SLP 19 1617        User Key  (r) = Recorded By, (t) = Taken By, (c) = Cosigned By    Initials Name Effective Dates Discipline     Catalina Nichols MS CCC-SLP 19 -  SLP          EDUCATION  The patient  has been educated in the following areas:   Cognitive Impairment Communication Impairment.    SLP Recommendation and Plan  Daily Summary of Progress (SLP): progress toward functional goals as expected     Plan for Continued Treatment (SLP): Continue to follow to address dysphagia,cognition and communication  Anticipated Dischage Disposition: unknown, anticipate therapy at next level of care             SLP GOALS     Row Name 12/12/19 1515 12/10/19 1420          Oral Nutrition/Hydration Goal 1 (SLP)    Oral Nutrition/Hydration Goal 1, SLP  LTG: Pt will return to regular diet and thin liquids w/ 100% accuracy w/o cues  -CH  LTG: Pt will return to regular diet and thin liquids w/ 100% accuracy w/o cues  -RD     Time Frame (Oral Nutrition/Hydration Goal 1, SLP)  by discharge  -CH  by discharge  -RD     Progress/Outcomes (Oral Nutrition/Hydration Goal 1, SLP)  continuing progress toward goal  -CH  --        Oral Nutrition/Hydration Goal 2 (SLP)    Oral Nutrition/Hydration Goal 2, SLP  Pt will tolerate level 3-pureed w/ some mashed and honey-thick liquids w/ compensations w/ no overt s/s of aspiration w/ 100% accuracy w/o cues  -CH  Pt will tolerate level 3-pureed w/ some mashed and honey-thick liquids w/ compensations w/ no overt s/s of aspiration w/ 100% accuracy w/o cues  -RD     Time Frame (Oral Nutrition/Hydration Goal 2, SLP)  short term goal (STG);by discharge  -CH  short term goal (STG);by discharge  -RD     Barriers (Oral Nutrition/Hydration Goal 2, SLP)  Patient tolerating pureed, some mashed and HT liquids. Wet vocals noted x 2 with patient requiring verbal cueing for throat clear/reswallow.   -CH  --     Progress/Outcomes (Oral Nutrition/Hydration Goal 2, SLP)  continuing progress toward goal  -CH  --        Lingual Strengthening Goal 1 (SLP)    Activity (Lingual Strengthening Goal 1, SLP)  increase lingual tone/sensation/control/coordination/movement;increase tongue back strength  -CH  increase lingual  tone/sensation/control/coordination/movement;increase tongue back strength  -RD     Increase Lingual Tone/Sensation/Control/Coordination/Movement  lingual movement exercises  -CH  lingual movement exercises  -RD     Increase Tongue Back Strength  lingual resistance exercises  -CH  lingual resistance exercises  -RD     [place]/Accuracy (Lingual Strengthening Goal 1, SLP)  with minimal cues (75-90% accuracy)  -CH  with minimal cues (75-90% accuracy)  -RD     Time Frame (Lingual Strengthening Goal 1, SLP)  short term goal (STG);by discharge  -CH  short term goal (STG);by discharge  -RD     Barriers (Lingual Strengthening Goal 1, SLP)  Patient completed exercises with mod cues and ST models x 3 each  -CH  --     Progress/Outcomes (Lingual Strengthening Goal 1, SLP)  continuing progress toward goal  -CH  --        Pharyngeal Strengthening Exercise Goal 1 (SLP)    Activity (Pharyngeal Strengthening Goal 1, SLP)  increase timing;increase closure at entrance to airway/closure of airway at glottis;increase squeeze/positive pressure generation;increase tongue base retraction;increase PES opening  -CH  increase timing;increase closure at entrance to airway/closure of airway at glottis;increase squeeze/positive pressure generation;increase tongue base retraction;increase PES opening  -RD     Increase Timing  prepping - 3 second prep or suck swallow or 3-step swallow  -CH  prepping - 3 second prep or suck swallow or 3-step swallow  -RD     Increase Closure at Entrance to Airway/Closure of Airway at Glottis  super-supraglottic swallow  -CH  super-supraglottic swallow  -RD     Increase Squeeze/Positive Pressure Generation  effortful pitch glide (falsetto + pharyngeal squeeze)  -CH  effortful pitch glide (falsetto + pharyngeal squeeze)  -RD     Increase Tongue Base Retraction  hard effortful swallow;williams  -CH  hard effortful swallow;williams  -RD     Increase PES Opening  chin tuck against resistance (CTAR)  -  chin tuck  against resistance (CTAR)  -RD     [place]/Accuracy (Pharyngeal Strengthening Goal 1, SLP)  with minimal cues (75-90% accuracy)  -CH  with minimal cues (75-90% accuracy)  -RD     Time Frame (Pharyngeal Strengthening Goal 1, SLP)  short term goal (STG);by discharge  -CH  short term goal (STG);by discharge  -RD     Barriers (Pharyngeal Strengthening Goal 1, SLP)  Patient completed exercises with mod cues and ST models x 3 each  -CH  --     Progress/Outcomes (Pharyngeal Strengthening Goal 1, SLP)  continuing progress toward goal  -CH  --        Swallow Management Recall Goal 1 (SLP)    Activity (Swallow Management Recall Goal 1, SLP)  recall of;safe diet level/texture;safe liquid viscosity;compensatory swallow strategies/techniques;postural techniques;rationale for use of strategies/techniques  -CH  recall of;safe diet level/texture;safe liquid viscosity;compensatory swallow strategies/techniques;postural techniques;rationale for use of strategies/techniques  -RD     [place]/Accuracy (Swallow Management Recall Goal 1, SLP)  with minimal cues (75-90% accuracy)  -CH  with minimal cues (75-90% accuracy)  -RD     Time Frame (Swallow Management Recall Goal 1, SLP)  short term goal (STG);by discharge  -CH  short term goal (STG);by discharge  -RD     Barriers (Swallow Management Recall Goal 1, SLP)  Patient required min cues for recall of diet consistency/liquid consistency  -  --     Progress/Outcomes (Swallow Management Recall Goal 1, SLP)  continuing progress toward goal  -CH  --        Swallow Compensatory Strategies Goal 1 (SLP)    Activity (Swallow Compensatory Strategies/Techniques Goal 1, SLP)  compensatory strategies;postural techniques;during meal intake;small bites;small cup sips;chin tuck posture;other (see comments)  -CH  compensatory strategies;postural techniques;during meal intake;small bites;small cup sips;chin tuck posture;other (see comments) L lingual sweep w/ solids  -RD      [place]/Accuracy (Swallow Compensatory Strategies/Techniques Goal 1, SLP)  independently (over 90% accuracy)  -CH  independently (over 90% accuracy)  -RD     Time Frame (Swallow Compensatory Strategies/Techniques Goal 1, SLP)  short term goal (STG);by discharge  -CH  short term goal (STG);by discharge  -RD     Barriers (Swallow Compensatory Strategies/Techniques Goal 1, SLP)  Patient required min cues for recall of need for chin tuck and sm single bites and sips.  -CH  --     Progress/Outcomes (Swallow Compensatory Strategies/Techniques Goal 1, SLP)  continuing progress toward goal  -CH  --        Phonation Goal 1 (SLP)    Improve Phonation By Goal 1 (SLP)  using loud speech;80%;with minimal cues (75-90%)  -CH  using loud speech;80%;with minimal cues (75-90%)  -RD     Time Frame (Phonation Goal 1, SLP)  short term goal (STG);by discharge  -CH  short term goal (STG);by discharge  -RD     Progress (Phonation Goal 1, SLP)  50%;with moderate cues (50-74%)  -CH  --     Progress/Outcomes (Phonation Goal 1, SLP)  continuing progress toward goal  -CH  --        Reduce Perception of Hypernasality Goal 1 (SLP)    Reduce Perception of Hypernasality By Goal 1 (SLP)  opening mouth wider for speech;80%;with minimal cues (75-90%)  -CH  opening mouth wider for speech;80%;with minimal cues (75-90%)  -RD     Time Frame (Perception of Hypernasality Goal 1, SLP)  short term goal (STG);by discharge  -CH  short term goal (STG);by discharge  -RD     Progress (Perception of Hypernasality Goal 1, SLP)  50%;with moderate cues (50-74%)  -CH  --     Progress/Outcomes (Perception of Hypernasality Goal 1, SLP)  continuing progress toward goal  -CH  --        Articulation Goal 1 (SLP)    Improve Articulation Goal 1 (SLP)  of specific sounds in connected speech;by over-articulating in connected speech;80%;with minimal cues (75-90%)  -CH  of specific sounds in connected speech;by over-articulating in connected speech;80%;with minimal cues  (75-90%)  -RD     Time Frame (Articulation Goal 1, SLP)  short term goal (STG);by discharge  -CH  short term goal (STG);by discharge  -RD     Progress/Outcomes (Articulation Goal 1, SLP)  goal ongoing  -  --        Memory Skills Goal 1 (SLP)    Improve Memory Skills Through Goal 1 (SLP)  recalling unrelated word lists with an imposed delay;80%;with minimal cues (75-90%)  -  recalling unrelated word lists with an imposed delay;80%;with minimal cues (75-90%)  -RD     Time Frame (Memory Skills Goal 1, SLP)  short term goal (STG);by discharge  -CH  short term goal (STG);by discharge  -RD     Progress/Outcomes (Memory Skills Goal 1, SLP)  goal ongoing  -  --        Executive Functional Skills Goal 1 (SLP)    Improve Executive Function Skills Goal 1 (SLP)  demonstrate awareness of deficit;identify strategies, strengths, limitations;identify anticipated needs;80%;with minimal cues (75-90%)  -  demonstrate awareness of deficit;identify strategies, strengths, limitations;identify anticipated needs;80%;with minimal cues (75-90%)  -RD     Time Frame (Executive Function Skills Goal 1, SLP)  short term goal (STG);by discharge  -CH  short term goal (STG);by discharge  -RD     Progress (Executive Function Skills Goal 1, SLP)  70%;with moderate cues (50-74%)  -  --     Progress/Outcomes (Executive Function Skills Goal 1, SLP)  continuing progress toward goal  -CH  --        Additional Goal 1 (SLP)    Additional Goal 1, SLP  LTG: Pt will improve cognitive-communication skills to actively participate in care w/ 100% accuracy w/o cues.   -  LTG: Pt will improve cognitive-communication skills to actively participate in care w/ 100% accuracy w/o cues.   -RD     Time Frame (Additional Goal 1, SLP)  by discharge  -CH  by discharge  -RD     Progress/Outcomes (Additional Goal 1, SLP)  continuing progress toward goal  -CH  --       User Key  (r) = Recorded By, (t) = Taken By, (c) = Cosigned By    Initials Name Provider Type    RD  Winnie Durbin, MS CCC-SLP Speech and Language Pathologist     Catalina Nichols, MS CCC-SLP Speech and Language Pathologist              Time Calculation:     Time Calculation- SLP     Row Name 19 1627             Time Calculation- SLP    SLP Start Time  1515  -      SLP Received On  19  -        User Key  (r) = Recorded By, (t) = Taken By, (c) = Cosigned By    Initials Name Provider Type     Catalina Nichols MS CCC-SLP Speech and Language Pathologist          Therapy Charges for Today     Code Description Service Date Service Provider Modifiers Qty    00296653680 HC ST TREATMENT SWALLOW 3 2019 Simone Catalina, MS CCC-SLP GN 1    [id number] HC ST TREATMENT SPEECH 2 2019 Catalina Nichols, MS CCC-SLP GN 1                     Catalina Nichols MS CCC-SLP  2019   and [facility] - Speech Language Pathology   Swallow Treatment Note  [place]     Patient Name: Tho Kohli  : 10/8/1927  MRN: [id number]  Today's Date: 2019               Admit Date: 2019    Visit Dx:      ICD-10-CM ICD-9-CM   1. Dysarthria R47.1 784.51   2. Left-sided weakness R53.1 728.87   3. Impaired mobility and ADLs Z74.09 799.89   4. Oropharyngeal dysphagia R13.12 787.22   5. 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 Acute CVA (cerebrovascular accident) (CMS/[facility]) I63.9 434.91     Patient Active Problem List   Diagnosis   • Stroke (CMS/[facility])   • Essential hypertension   • Dysarthria   • SALVADOR (acute kidney injury) (CMS/[facility])   • Grade III diastolic dysfunction   • Elevated troponin   • Abnormal EKG       Therapy Treatment  Rehabilitation Treatment Summary     Row Name 19 1515             Treatment Time/Intention    Discipline  speech language pathologist  -      Document Type  therapy note (daily note)  -      Subjective Information  no complaints  -      Mode of Treatment  speech-language pathology  -      Patient/Family Observations  No family present  -       Therapy Frequency (Swallow)  5 days per week  -CH      Therapy Frequency (SLP SLC)  5 days per week  -CH      Patient Effort  good  -CH      Recorded by [CH] Catalina Nichols MS CCC-SLP 12/12/19 1617      Row Name 12/12/19 1515             Outcome Summary/Treatment Plan (SLP)    Daily Summary of Progress (SLP)  progress toward functional goals as expected  -CH      Plan for Continued Treatment (SLP)  Continue to follow to address dysphagia,cognition and communication  -CH      Anticipated Dischage Disposition  unknown;anticipate therapy at next level of care  -CH      Recorded by [CH] Catalina Nichols MS CCC-SLP 12/12/19 1617        User Key  (r) = Recorded By, (t) = Taken By, (c) = Cosigned By    Initials Name Effective Dates Discipline    CH Catalina Nichols MS CCC-SLP 02/14/19 -  SLP          Outcome Summary  Outcome Summary/Treatment Plan (SLP)  Daily Summary of Progress (SLP): progress toward functional goals as expected (12/12/19 1515 : Catalina Nichols MS CCC-SLP)  Plan for Continued Treatment (SLP): Continue to follow to address dysphagia,cognition and communication (12/12/19 1515 : Catalina Nichols, MS CCC-SLP)  Anticipated Dischage Disposition: unknown, anticipate therapy at next level of care (12/12/19 1515 : Catalina Nichols [facility])      SLP GOALS     Row Name 12/12/19 1515 12/10/19 1420          Oral Nutrition/Hydration Goal 1 (SLP)    Oral Nutrition/Hydration Goal 1, SLP  LTG: Pt will return to regular diet and thin liquids w/ 100% accuracy w/o cues  -CH  LTG: Pt will return to regular diet and thin liquids w/ 100% accuracy w/o cues  -RD     Time Frame (Oral Nutrition/Hydration Goal 1, SLP)  by discharge  -CH  by discharge  -RD     Progress/Outcomes (Oral Nutrition/Hydration Goal 1, SLP)  continuing progress toward goal  -CH  --        Oral Nutrition/Hydration Goal 2 (SLP)    Oral Nutrition/Hydration Goal 2, SLP  Pt will tolerate level 3-pureed w/ some mashed and honey-thick liquids w/  compensations w/ no overt s/s of aspiration w/ 100% accuracy w/o cues  -CH  Pt will tolerate level 3-pureed w/ some mashed and honey-thick liquids w/ compensations w/ no overt s/s of aspiration w/ 100% accuracy w/o cues  -RD     Time Frame (Oral Nutrition/Hydration Goal 2, SLP)  short term goal (STG);by discharge  -CH  short term goal (STG);by discharge  -RD     Barriers (Oral Nutrition/Hydration Goal 2, SLP)  Patient tolerating pureed, some mashed and HT liquids. Wet vocals noted x 2 with patient requiring verbal cueing for throat clear/reswallow.   -CH  --     Progress/Outcomes (Oral Nutrition/Hydration Goal 2, SLP)  continuing progress toward goal  -CH  --        Lingual Strengthening Goal 1 (SLP)    Activity (Lingual Strengthening Goal 1, SLP)  increase lingual tone/sensation/control/coordination/movement;increase tongue back strength  -CH  increase lingual tone/sensation/control/coordination/movement;increase tongue back strength  -RD     Increase Lingual Tone/Sensation/Control/Coordination/Movement  lingual movement exercises  -CH  lingual movement exercises  -RD     Increase Tongue Back Strength  lingual resistance exercises  -CH  lingual resistance exercises  -RD     [place]/Accuracy (Lingual Strengthening Goal 1, SLP)  with minimal cues (75-90% accuracy)  -CH  with minimal cues (75-90% accuracy)  -RD     Time Frame (Lingual Strengthening Goal 1, SLP)  short term goal (STG);by discharge  -CH  short term goal (STG);by discharge  -RD     Barriers (Lingual Strengthening Goal 1, SLP)  Patient completed exercises with mod cues and ST models x 3 each  -CH  --     Progress/Outcomes (Lingual Strengthening Goal 1, SLP)  continuing progress toward goal  -CH  --        Pharyngeal Strengthening Exercise Goal 1 (SLP)    Activity (Pharyngeal Strengthening Goal 1, SLP)  increase timing;increase closure at entrance to airway/closure of airway at glottis;increase squeeze/positive pressure generation;increase tongue base  retraction;increase PES opening  -CH  increase timing;increase closure at entrance to airway/closure of airway at glottis;increase squeeze/positive pressure generation;increase tongue base retraction;increase PES opening  -RD     Increase Timing  prepping - 3 second prep or suck swallow or 3-step swallow  -CH  prepping - 3 second prep or suck swallow or 3-step swallow  -RD     Increase Closure at Entrance to Airway/Closure of Airway at Glottis  super-supraglottic swallow  -CH  super-supraglottic swallow  -RD     Increase Squeeze/Positive Pressure Generation  effortful pitch glide (falsetto + pharyngeal squeeze)  -CH  effortful pitch glide (falsetto + pharyngeal squeeze)  -RD     Increase Tongue Base Retraction  hard effortful swallow;williams  -CH  hard effortful swallow;williams  -RD     Increase PES Opening  chin tuck against resistance (CTAR)  -CH  chin tuck against resistance (CTAR)  -RD     [place]/Accuracy (Pharyngeal Strengthening Goal 1, SLP)  with minimal cues (75-90% accuracy)  -CH  with minimal cues (75-90% accuracy)  -RD     Time Frame (Pharyngeal Strengthening Goal 1, SLP)  short term goal (STG);by discharge  -CH  short term goal (STG);by discharge  -RD     Barriers (Pharyngeal Strengthening Goal 1, SLP)  Patient completed exercises with mod cues and ST models x 3 each  -CH  --     Progress/Outcomes (Pharyngeal Strengthening Goal 1, SLP)  continuing progress toward goal  -CH  --        Swallow Management Recall Goal 1 (SLP)    Activity (Swallow Management Recall Goal 1, SLP)  recall of;safe diet level/texture;safe liquid viscosity;compensatory swallow strategies/techniques;postural techniques;rationale for use of strategies/techniques  -  recall of;safe diet level/texture;safe liquid viscosity;compensatory swallow strategies/techniques;postural techniques;rationale for use of strategies/techniques  -RD     [place]/Accuracy (Swallow Management Recall Goal 1, SLP)  with minimal cues (75-90%  accuracy)  -CH  with minimal cues (75-90% accuracy)  -RD     Time Frame (Swallow Management Recall Goal 1, SLP)  short term goal (STG);by discharge  -CH  short term goal (STG);by discharge  -RD     Barriers (Swallow Management Recall Goal 1, SLP)  Patient required min cues for recall of diet consistency/liquid consistency  -CH  --     Progress/Outcomes (Swallow Management Recall Goal 1, SLP)  continuing progress toward goal  -CH  --        Swallow Compensatory Strategies Goal 1 (SLP)    Activity (Swallow Compensatory Strategies/Techniques Goal 1, SLP)  compensatory strategies;postural techniques;during meal intake;small bites;small cup sips;chin tuck posture;other (see comments)  -CH  compensatory strategies;postural techniques;during meal intake;small bites;small cup sips;chin tuck posture;other (see comments) L lingual sweep w/ solids  -RD     [place]/Accuracy (Swallow Compensatory Strategies/Techniques Goal 1, SLP)  independently (over 90% accuracy)  -CH  independently (over 90% accuracy)  -RD     Time Frame (Swallow Compensatory Strategies/Techniques Goal 1, SLP)  short term goal (STG);by discharge  -CH  short term goal (STG);by discharge  -RD     Barriers (Swallow Compensatory Strategies/Techniques Goal 1, SLP)  Patient required min cues for recall of need for chin tuck and sm single bites and sips.  -CH  --     Progress/Outcomes (Swallow Compensatory Strategies/Techniques Goal 1, SLP)  continuing progress toward goal  -CH  --        Phonation Goal 1 (SLP)    Improve Phonation By Goal 1 (SLP)  using loud speech;80%;with minimal cues (75-90%)  -CH  using loud speech;80%;with minimal cues (75-90%)  -RD     Time Frame (Phonation Goal 1, SLP)  short term goal (STG);by discharge  -CH  short term goal (STG);by discharge  -RD     Progress (Phonation Goal 1, SLP)  50%;with moderate cues (50-74%)  -CH  --     Progress/Outcomes (Phonation Goal 1, SLP)  continuing progress toward goal  -CH  --        Reduce  Perception of Hypernasality Goal 1 (SLP)    Reduce Perception of Hypernasality By Goal 1 (SLP)  opening mouth wider for speech;80%;with minimal cues (75-90%)  -CH  opening mouth wider for speech;80%;with minimal cues (75-90%)  -RD     Time Frame (Perception of Hypernasality Goal 1, SLP)  short term goal (STG);by discharge  -CH  short term goal (STG);by discharge  -RD     Progress (Perception of Hypernasality Goal 1, SLP)  50%;with moderate cues (50-74%)  -  --     Progress/Outcomes (Perception of Hypernasality Goal 1, SLP)  continuing progress toward goal  -CH  --        Articulation Goal 1 (SLP)    Improve Articulation Goal 1 (SLP)  of specific sounds in connected speech;by over-articulating in connected speech;80%;with minimal cues (75-90%)  -  of specific sounds in connected speech;by over-articulating in connected speech;80%;with minimal cues (75-90%)  -RD     Time Frame (Articulation Goal 1, SLP)  short term goal (STG);by discharge  -  short term goal (STG);by discharge  -RD     Progress/Outcomes (Articulation Goal 1, SLP)  goal ongoing  -  --        Memory Skills Goal 1 (SLP)    Improve Memory Skills Through Goal 1 (SLP)  recalling unrelated word lists with an imposed delay;80%;with minimal cues (75-90%)  -  recalling unrelated word lists with an imposed delay;80%;with minimal cues (75-90%)  -RD     Time Frame (Memory Skills Goal 1, SLP)  short term goal (STG);by discharge  -  short term goal (STG);by discharge  -RD     Progress/Outcomes (Memory Skills Goal 1, SLP)  goal ongoing  -  --        Executive Functional Skills Goal 1 (SLP)    Improve Executive Function Skills Goal 1 (SLP)  demonstrate awareness of deficit;identify strategies, strengths, limitations;identify anticipated needs;80%;with minimal cues (75-90%)  -  demonstrate awareness of deficit;identify strategies, strengths, limitations;identify anticipated needs;80%;with minimal cues (75-90%)  -RD     Time Frame (Executive Function  Skills Goal 1, SLP)  short term goal (STG);by discharge  -CH  short term goal (STG);by discharge  -RD     Progress (Executive Function Skills Goal 1, SLP)  70%;with moderate cues (50-74%)  -CH  --     Progress/Outcomes (Executive Function Skills Goal 1, SLP)  continuing progress toward goal  -CH  --        Additional Goal 1 (SLP)    Additional Goal 1, SLP  LTG: Pt will improve cognitive-communication skills to actively participate in care w/ 100% accuracy w/o cues.   -CH  LTG: Pt will improve cognitive-communication skills to actively participate in care w/ 100% accuracy w/o cues.   -RD     Time Frame (Additional Goal 1, SLP)  by discharge  -CH  by discharge  -RD     Progress/Outcomes (Additional Goal 1, SLP)  continuing progress toward goal  -CH  --       User Key  (r) = Recorded By, (t) = Taken By, (c) = Cosigned By    Initials Name Provider Type    Winnie Kaur MS CCC-SLP Speech and Language Pathologist    Catalina Villavicencio MS CCC-SLP Speech and Language Pathologist          EDUCATION  The patient has been educated in the following areas:   Home Exercise Program (HEP) Dysphagia (Swallowing Impairment) Modified Diet Instruction.    SLP Recommendation and Plan  Daily Summary of Progress (SLP): progress toward functional goals as expected     Plan for Continued Treatment (SLP): Continue to follow to address dysphagia,cognition and communication  Anticipated Dischage Disposition: unknown, anticipate therapy at next level of care                    Time Calculation:   Time Calculation- SLP     Row Name 12/12/19 1627             Time Calculation- SLP    SLP Start Time  1515  -      SLP Received On  12/12/19  -        User Key  (r) = Recorded By, (t) = Taken By, (c) = Cosigned By    Initials Name Provider Type    Catalina Villavicencio, MS CCC-SLP Speech and Language Pathologist          Therapy Charges for Today     Code Description Service Date Service Provider Modifiers Qty    [id number] [facility]  TREATMENT SWALLOW 3 12/12/2019 Catalina Nichols, MS CCC-SLP GN 1    [id number] [facility] TREATMENT SPEECH 2 12/12/2019 Catalina Nichols, MS CCC-SLP GN 1                 Catalina Nichols MS CCC-SLP  12/12/2019

## 2019-12-13 ENCOUNTER — ANCILLARY PROCEDURE (OUTPATIENT)
Dept: SPEECH THERAPY | Facility: HOSPITAL | Age: 84
End: 2019-12-13

## 2019-12-13 LAB
ANION GAP SERPL CALCULATED.3IONS-SCNC: 13 MMOL/L (ref 5–15)
BACTERIA SPEC AEROBE CULT: ABNORMAL
BUN BLD-MCNC: 31 MG/DL (ref 8–23)
BUN/CREAT SERPL: 31 (ref 7–25)
CALCIUM SPEC-SCNC: 8.1 MG/DL (ref 8.2–9.6)
CHLORIDE SERPL-SCNC: 103 MMOL/L (ref 98–107)
CO2 SERPL-SCNC: 24 MMOL/L (ref 22–29)
CREAT BLD-MCNC: 1 MG/DL (ref 0.76–1.27)
DEPRECATED RDW RBC AUTO: 53.6 FL (ref 37–54)
ERYTHROCYTE [DISTWIDTH] IN BLOOD BY AUTOMATED COUNT: 13.2 % (ref 12.3–15.4)
GFR SERPL CREATININE-BSD FRML MDRD: 70 ML/MIN/1.73
GLUCOSE BLD-MCNC: 110 MG/DL (ref 65–99)
HCT VFR BLD AUTO: 40.8 % (ref 37.5–51)
HGB BLD-MCNC: 12.6 G/DL (ref 13–17.7)
MCH RBC QN AUTO: 34 PG (ref 26.6–33)
MCHC RBC AUTO-ENTMCNC: 30.9 G/DL (ref 31.5–35.7)
MCV RBC AUTO: 110 FL (ref 79–97)
PLATELET # BLD AUTO: 146 10*3/MM3 (ref 140–450)
PMV BLD AUTO: 11 FL (ref 6–12)
POTASSIUM BLD-SCNC: 3.2 MMOL/L (ref 3.5–5.2)
RBC # BLD AUTO: 3.71 10*6/MM3 (ref 4.14–5.8)
SODIUM BLD-SCNC: 140 MMOL/L (ref 136–145)
WBC NRBC COR # BLD: 13.28 10*3/MM3 (ref 3.4–10.8)

## 2019-12-13 PROCEDURE — 80048 BASIC METABOLIC PNL TOTAL CA: CPT | Performed by: INTERNAL MEDICINE

## 2019-12-13 PROCEDURE — 99232 SBSQ HOSP IP/OBS MODERATE 35: CPT | Performed by: INTERNAL MEDICINE

## 2019-12-13 PROCEDURE — 99231 SBSQ HOSP IP/OBS SF/LOW 25: CPT | Performed by: PSYCHIATRY & NEUROLOGY

## 2019-12-13 PROCEDURE — 85027 COMPLETE CBC AUTOMATED: CPT | Performed by: INTERNAL MEDICINE

## 2019-12-13 PROCEDURE — 25010000002 CEFTRIAXONE PER 250 MG: Performed by: INTERNAL MEDICINE

## 2019-12-13 PROCEDURE — 97530 THERAPEUTIC ACTIVITIES: CPT

## 2019-12-13 PROCEDURE — 92526 ORAL FUNCTION THERAPY: CPT

## 2019-12-13 PROCEDURE — 92612 ENDOSCOPY SWALLOW (FEES) VID: CPT

## 2019-12-13 PROCEDURE — 97116 GAIT TRAINING THERAPY: CPT

## 2019-12-13 PROCEDURE — 92507 TX SP LANG VOICE COMM INDIV: CPT

## 2019-12-13 RX ORDER — POTASSIUM CHLORIDE 750 MG/1
40 CAPSULE, EXTENDED RELEASE ORAL AS NEEDED
Status: DISCONTINUED | OUTPATIENT
Start: 2019-12-13 | End: 2019-12-14 | Stop reason: HOSPADM

## 2019-12-13 RX ORDER — POTASSIUM CHLORIDE 7.45 MG/ML
10 INJECTION INTRAVENOUS
Status: DISCONTINUED | OUTPATIENT
Start: 2019-12-13 | End: 2019-12-14 | Stop reason: HOSPADM

## 2019-12-13 RX ORDER — POTASSIUM CHLORIDE 1.5 G/1.77G
40 POWDER, FOR SOLUTION ORAL AS NEEDED
Status: DISCONTINUED | OUTPATIENT
Start: 2019-12-13 | End: 2019-12-14 | Stop reason: HOSPADM

## 2019-12-13 RX ADMIN — ATORVASTATIN CALCIUM 80 MG: 40 TABLET, FILM COATED ORAL at 21:06

## 2019-12-13 RX ADMIN — CEFTRIAXONE 1 G: 1 INJECTION, POWDER, FOR SOLUTION INTRAMUSCULAR; INTRAVENOUS at 11:18

## 2019-12-13 RX ADMIN — POTASSIUM CHLORIDE 40 MEQ: 1.5 POWDER, FOR SOLUTION ORAL at 18:32

## 2019-12-13 RX ADMIN — POTASSIUM CHLORIDE 40 MEQ: 750 CAPSULE, EXTENDED RELEASE ORAL at 12:09

## 2019-12-13 RX ADMIN — TAMSULOSIN HYDROCHLORIDE 0.4 MG: 0.4 CAPSULE ORAL at 09:38

## 2019-12-13 RX ADMIN — SODIUM CHLORIDE, PRESERVATIVE FREE 10 ML: 5 INJECTION INTRAVENOUS at 21:07

## 2019-12-13 RX ADMIN — APIXABAN 2.5 MG: 2.5 TABLET, FILM COATED ORAL at 09:38

## 2019-12-13 RX ADMIN — APIXABAN 2.5 MG: 2.5 TABLET, FILM COATED ORAL at 21:07

## 2019-12-13 RX ADMIN — SODIUM CHLORIDE, PRESERVATIVE FREE 10 ML: 5 INJECTION INTRAVENOUS at 09:38

## 2019-12-13 NOTE — PLAN OF CARE
Problem: Patient Care Overview  Goal: Plan of Care Review  Outcome: Ongoing (interventions implemented as appropriate)  Flowsheets (Taken 12/13/2019 1006)  Plan of Care Reviewed With: patient  Note:   SLP treatment completed. Will continue to address dysphagia and cognitive-communication deficits in treatment. Will follow-up w/ FEES for possible diet upgrade. Please see note for further details and recommendations.

## 2019-12-13 NOTE — PROGRESS NOTES
"[facility] Progress Note     LOS: 4 days   Patient Care Team:  Daniel Adams MD as PCP - General (Family Medicine)  PCP:  Daniel Adams MD    Chief Complaint: CVA    SUBJECTIVE: Resting comfortably in bed.  No acute issues.  Telemetry feels sinus rhythm.      Review of Systems:   All systems have been reviewed and are negative with the exception of those mentioned above.      OBJECTIVE:    Vital Sign Min/Max for last 24 hours  Temp  Min: 97.7 °F (36.5 °C)  Max: 98.6 °F (37 °C)   BP  Min: 97/65  Max: 141/89   Pulse  Min: 62  Max: 93   Resp  Min: 18  Max: 20   SpO2  Min: 93 %  Max: 97 %   No data recorded   No data recorded     Flowsheet Rows      First Filed Value   Admission Height  180.3 cm (71\") Documented at 12/09/2019 1129   Admission Weight  75.8 kg (167 lb) Documented at 12/09/2019 1129            Intake/Output Summary (Last 24 hours) at 12/13/2019 1103  Last data filed at 12/13/2019 0030  Gross per 24 hour   Intake --   Output 350 ml   Net -350 ml     Intake & Output (last 3 days)       12/10 0701 - 12/11 0700 12/11 0701 - 12/12 0700 12/12 0701 - 12/13 0700 12/13 0701 - 12/14 0700    I.V. (mL/kg) 1000 (13.8)       IV Piggyback        Total Intake(mL/kg) 1000 (13.8)       Urine (mL/kg/hr) 2300 (1.3) 1630 (0.9) 350 (0.2)     Stool  0      Total Output 2300 1630 350     Net -1300 -1630 -350             Urine Unmeasured Occurrence  1 x 1 x     Stool Unmeasured Occurrence  1 x 2 x            Physical Exam:    General Appearance:    Alert, cooperative, no distress, appears stated age   Neck:   Supple, symmetrical, trachea midline.   Lungs:     Clear to auscultation bilaterally, respirations unlabored   Chest Wall:    No tenderness or deformity    Heart:    Regular rate and rhythm, S1 and S2 normal, no murmur, rub   or gallop, normal carotid impulse bilaterally without bruit.   Extremities:   Extremities normal, atraumatic, no cyanosis or edema   Pulses:   2+ and " symmetric all extremities   Skin:   Skin color, texture, turgor normal, no rashes or lesions      LABS/DIAGNOSTIC DATA:  Results from last 7 days   Lab Units 12/13/19  0600 12/09/19  1136 12/09/19  1131   WBC 10*3/mm3 13.28* 8.83  --    HEMOGLOBIN g/dL 12.6* 12.9*  --    HEMOGLOBIN, POC g/dL  --   --  13.3   HEMATOCRIT % 40.8 39.6  --    HEMATOCRIT POC %  --   --  39   PLATELETS 10*3/mm3 146 197  --      Lab Results   Lab Value Date/Time    TROPONINT 0.093 (C) 12/09/2019 1419    TROPONINT 0.055 (C) 12/09/2019 1136     Results from last 7 days   Lab Units 12/09/19  1136 12/09/19  1130   INR   --  1.0   APTT seconds 29.7  --      Results from last 7 days   Lab Units 12/13/19  0600 12/10/19  0513 12/09/19  1136 12/09/19  1131   SODIUM mmol/L 140 139  --   --    POTASSIUM mmol/L 3.2* 3.6  --   --    CHLORIDE mmol/L 103 102  --   --    CO2 mmol/L 24.0 25.0  --   --    BUN mg/dL 31* 23  --   --    CREATININE mg/dL 1.00 1.19  --  1.60*   CALCIUM mg/dL 8.1* 9.0  --   --    ALT (SGPT) U/L  --   --  16  --    AST (SGOT) U/L  --   --  21  --    GLUCOSE mg/dL 110* 86  --   --      Results from last 7 days   Lab Units 12/10/19  0513   HEMOGLOBIN A1C % 5.30     Results from last 7 days   Lab Units 12/10/19  0513   CHOLESTEROL mg/dL 133   TRIGLYCERIDES mg/dL 46   HDL CHOL mg/dL 57   LDL CHOL mg/dL 67               Medication Review:     apixaban 2.5 mg Oral Q12H   atorvastatin 80 mg Oral Nightly   cefTRIAXone 1 g Intravenous Q24H   sodium chloride 10 mL Intravenous Q12H   tamsulosin 0.4 mg Oral Daily            ASSESSMENT/PLAN:    Stroke (CMS/[facility])    Essential hypertension    Dysarthria    SALVADOR (acute kidney injury) (CMS/[facility])    Grade III diastolic dysfunction    Elevated troponin    Abnormal EKG      CVA: Bilateral acute infarct.  No obvious source of cardio emboli have been identified after transthoracic and transesophageal echo imaging.  Carotid imaging has been unremarkable as well.  Our clinical suspicion remains high for  cardioembolic source such as underlying occult atrial fibrillation as is etiology.  I agree that he is high risk for potential recurrent stroke and that his overall risk to benefit ratio favors at least an initial trial of anticoagulation with Eliquis.  Recommending 2.5 mg p.o. twice daily due to his advanced aged and underlying frailty.  I feel that concomitant antiplatelet therapy would place him at an inordinately high risk for bleeding complications.  We will arrange for 30-day event monitor once he is completed a course of inpatient rehab.          Seymour Calle III, MD   12/13/19  11:03 AM

## 2019-12-13 NOTE — PLAN OF CARE
Problem: Patient Care Overview  Goal: Plan of Care Review  12/13/2019 1319 by Deanne Baker, Speech Therapy Student  Flowsheets (Taken 12/13/2019 1319)  Plan of Care Reviewed With: parent  Note:   SLP FEES evaluation completed. Will continue to address dysphagia in treatment and upgrade diet to nectar thick liquids and mechanical soft w/ no mixed consistencies. Please see note for further details and recommendations.

## 2019-12-13 NOTE — PROGRESS NOTES
[facility] Medicine Services  PROGRESS NOTE    Patient Name: Tho Kohli  : 10/8/1927  MRN: [id number]    Date of Admission: 2019  Primary Care Physician: Daniel Adams MD    Subjective   Subjective     CC: F/U bilateral CVA    HPI:  He is doing OK today.  Speech is a little better.  He is voiding some on his own today and not grossly bloody per tech.    Review of Systems  CV- No chest pain, palpitations  Resp- No cough, dyspnea  GI- No N/V/D, abd pain    Objective   Objective     Vital Signs:   Temp:  [97.7 °F (36.5 °C)-99.5 °F (37.5 °C)] 97.8 °F (36.6 °C)  Heart Rate:  [] 93  Resp:  [20] 20  BP: ()/() 97/65  Total (NIH Stroke Scale): 3     Physical Exam:  Constitutional: No acute distress, awake, alert  HENT: NCAT, mucous membranes moist  Respiratory: Clear to auscultation bilaterally, respiratory effort normal   Cardiovascular: RRR, no murmurs, rubs, or gallops  Gastrointestinal: Positive bowel sounds, soft, nontender, nondistended  Musculoskeletal: No bilateral ankle edema  Psychiatric: Appropriate affect, cooperative  Neurologic: Speech is dysarthric, no facial asymmetry  Skin: No rashes    Results Reviewed:    Results from last 7 days   Lab Units 19  1136 19  1131 19  1130   WBC 10*3/mm3 8.83  --   --    HEMOGLOBIN g/dL 12.9*  --   --    HEMOGLOBIN, POC g/dL  --  13.3  --    HEMATOCRIT % 39.6  --   --    HEMATOCRIT POC %  --  39  --    PLATELETS 10*3/mm3 197  --   --    INR   --   --  1.0     Results from last 7 days   Lab Units 12/10/19  0513 19  1419 19  1136 19  1131   SODIUM mmol/L 139  --   --   --    POTASSIUM mmol/L 3.6  --   --   --    CHLORIDE mmol/L 102  --   --   --    CO2 mmol/L 25.0  --   --   --    BUN mg/dL 23  --   --   --    CREATININE mg/dL 1.19  --   --  1.60*   GLUCOSE mg/dL 86  --   --   --    CALCIUM mg/dL 9.0  --   --   --    ALT (SGPT) U/L  --   --  16  --    AST (SGOT) U/L  --   --  21   --    TROPONIN T ng/mL  --  0.093* 0.055*  --      Estimated Creatinine Clearance: 40.7 mL/min (by C-G formula based on SCr of 1.19 mg/dL).    Microbiology Results Abnormal     Procedure Component Value - Date/Time    Urine Culture - Urine, Urine, Clean Catch [[id number]]  (Abnormal) Collected:  12/11/19 0339    Lab Status:  Preliminary result Specimen:  Urine, Clean Catch Updated:  12/12/19 1201     Urine Culture >100,000 CFU/mL Escherichia coli          Imaging Results (Last 24 Hours)     ** No results found for the last 24 hours. **          Results for orders placed during the hospital encounter of 12/09/19   Adult Transesophageal Echo (BLADIMIR) W/ Cont if Necessary Per Protocol    Narrative · Technically difficult and limited study due to presence of hiatal   hernia.  · No evidence of left atrial appendage thrombus  · Bubble study was negative for right to left interatrial shunt. No   evidence PFO  · Trileaflet aortic valve without significant abnormality  · Mild grade 1 plaque in descending aorta and aortic arch  · No cardiac source of embolus identified on this limited study          I have reviewed the medications:  Scheduled Meds:    apixaban 2.5 mg Oral Q12H   atorvastatin 80 mg Oral Nightly   cefTRIAXone 1 g Intravenous Q24H   sodium chloride 10 mL Intravenous Q12H   tamsulosin 0.4 mg Oral Daily     Continuous Infusions:   PRN Meds:.•  acetaminophen **OR** acetaminophen **OR** acetaminophen  •  ondansetron  •  sodium chloride  •  sodium chloride      Assessment/Plan   Assessment / Plan     Active Hospital Problems    Diagnosis  POA   • **Stroke (CMS/HCC) [I63.9]  Yes   • Grade III diastolic dysfunction [I51.9]  Yes   • Elevated troponin [R79.89]  Yes   • Abnormal EKG [R94.31]  Yes   • Essential hypertension [I10]  Yes   • Dysarthria [R47.1]  Yes   • SALVADOR (acute kidney injury) (CMS/HCC) [N17.9]  Yes      Resolved Hospital Problems   No resolved problems to display.        Brief Hospital Course to date:  Tho  JADIEL Kohli is a 89 y.o. male admitted with acute bilateral cerebral infarcts.    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  -Discussed with Neurology and cardiology-start Eliquis 2.5mg BID per cardiology recs  -Will remain off ASA while on Eliquis  -Continue atorvastatin  -Had BLADIMIR on 12/11/2019 without thrombus  -Cardiac monitor at discharge    Right lower extremity swelling  -Duplex ultrasound bilaterally negative for DVT     Dysarthria and dysphagia  -Thickened liquids and puréed diet    Acute urinary retention  Hematuria  UTI  -Started Flomax  -In and out cath per protocol as needed  -Start ceftraixone for E. Coli UTI    HTN  -Resume home medications as tolerated    DVT Prophylaxis: Eliquis    CODE STATUS:   Code Status and Medical Interventions:   Ordered at: 12/09/19 1402     Limited Support to NOT Include:    Antiarrhythmic Drugs    Intubation     Level Of Support Discussed With:    Patient     Code Status:    No CPR     Medical Interventions (Level of Support Prior to Arrest):    Limited         Electronically signed by Candi Hong MD, 12/12/19, 7:08 PM.

## 2019-12-13 NOTE — PROGRESS NOTES
"                  Clinical Nutrition     Reason for Visit:   Follow-up protocol    Patient Name: Tho Kohli  YOB: 1927  MRN: [id number]  Date of Encounter: 12/13/19 8:49 AM  Admission date: 12/9/2019    Nutrition Assessment   Assessment     Admission diagnosis  CVA    Additional diagnosis/conditions/procedures this admission  RLE swelling, ? DVT  Acute urinary retention    (12/9) SLP eval - mechanical soft with no mixed consistencies, nectar thick liquids; plan for MBS  (12/10) SLP MBS - puree with some mashed, honey thick liquids, other (see comments)(supervision w/ meals to ensure compensations)  (12/13) SLP FEES - pending    Additional PMH/procedures:  HTN  Overactive bladder  SDH    Reported/Observed/Food/Nutrition Related History:      Patient alert and up in bed. No family at bedside. Patient states he is tolerating modified diets ok. Had a little of breakfast this AM but not a lot. Reports he was eating normal, good amounts prior to admission. He denies weight loss in the past couple months. Reports his UBW is 165 lbs. Patient states he likes ice cream / pudding. Will trial Magic Cup to increase calorie/protein intake.    Anthropometrics     Height: 180.3 cm (71\")  Last filed wt: Weight: 72.6 kg (160 lb) (12/11/19 1208)  Weight Method: Bed scale    BMI: BMI (Calculated): 22.3  Normal: 18.5-24.9kg/m2    Ideal Body Weight (IBW) (kg): 79.27  Admission wt: 160 lbs  Method obtained: bed scale weight per charting 12/9    Weight Change   UBW: 165 lbs per pt report  Weight change: ~5 lbs   % wt change: 3%  Time frame of weight loss: ?    12/02/2019 09:20AM SICK VISIT  Height Weight BMI   71 in 165 lbs 2 oz 23 kg/m2      11/13/2019 01:30PM SICK VISIT  Height Weight BMI   71 in 164 lbs 6 oz 22.9 kg/m2      10/23/2019 04:15PM SICK VISIT  Height Weight BMI   71 in 165 lbs 2 oz 23 kg/m2      09/20/2019 01:15PM NEW PATIENT  Height Weight BMI   71 in 160 lbs 16 oz 22.5 kg/m2      Labs reviewed "     Results from last 7 days   Lab Units 12/13/19  0600 12/10/19  0513 12/09/19  1136 12/09/19  1131   GLUCOSE mg/dL 110* 86  --   --    BUN mg/dL 31* 23  --   --    CREATININE mg/dL 1.00 1.19  --  1.60*   SODIUM mmol/L 140 139  --   --    CHLORIDE mmol/L 103 102  --   --    POTASSIUM mmol/L 3.2* 3.6  --   --    ALT (SGPT) U/L  --   --  16  --      Results from last 7 days   Lab Units 12/10/19  0513   CHOLESTEROL mg/dL 133   TRIGLYCERIDES mg/dL 46       Results from last 7 days   Lab Units 12/10/19  0553 12/09/19  2353 12/09/19  1949 12/09/19  1449 12/09/19  1131   GLUCOSE mg/dL 89 102 173* 100 100     Lab Results   Lab Value Date/Time    HGBA1C 5.30 12/10/2019 0513       Medications reviewed   Pertinent: rocephin       Current Nutrition Prescription     PO: Diet Dysphagia; II - Pureed; Honey Thick; No Straws; Cardiac   Oral Nutrition Supplements: Boost Pudding x2/d  Intake: insuff data    Nutrition Diagnosis     12/11 updated 12/13  Problem Predicted suboptimal energy intake   Etiology Dysphagia / modified diet   Signs/Symptoms Minimal PO documentation on modified diet   Status: ongoing    12/11 updated 12/13  Problem Swallowing difficulty   Etiology Dysphagia   Signs/Symptoms SLP 12/10 - puree some mashed   Status: ongoing    Nutrition Intervention   1.  Follow treatment progress, Care plan reviewed  2.  Advised alternate selection, interviewed for preferences  3. Supplement adjusted - Magic Cup Chocolate x2/d  4. Encouraged oral / supplemental intake    Goal:   General: Nutrition support treatment  PO: Increase intake, Advace diet as medically feasible/appropriate      Monitoring/Evaluation:   Per protocol, PO intake, Supplement intake, Weight, POC/GOC, Swallow function    Will Continue to follow per protocol    Mercedes Mon RDN, LD  Time Spent: 30 minutes

## 2019-12-13 NOTE — PROGRESS NOTES
Neurology       Patient Care Team:  Daniel Adams MD as PCP - General (Family Medicine)    Chief complaint: Embolic stroke    History: Patient is a continue to improve.    He is able to swallow without tucking his chin and his says his arm is better.    Dr. Calle is wisely started him on reduced dose Eliquis to 2.5 mg twice daily.    Yesterday he walked 38 feet with moderate assistance with physical therapy  Past Medical History:   Diagnosis Date   • OAB (overactive bladder)        Vital Signs   Vitals:    12/12/19 2046 12/13/19 0314 12/13/19 0925 12/13/19 1143   BP: 110/76 134/85 141/89 119/74   BP Location: Left arm Right arm Left arm Right arm   Patient Position: Lying Lying Lying Sitting   Pulse: 87 79 62 65   Resp: 18 18 18 16   Temp: 98.6 °F (37 °C) 97.9 °F (36.6 °C) 98.1 °F (36.7 °C) 97.5 °F (36.4 °C)   TempSrc: Oral Oral Oral Oral   SpO2: 94% 93% 97% 95%   Weight:       Height:           Physical Exam:   General: Pleasant and alert              Neuro: Patient is moving significantly better.    Dysarthria continues to improve.    His  now is strong on the left-hand side.        Results Review:  Reviewed  Results from last 7 days   Lab Units 12/13/19  0600   WBC 10*3/mm3 13.28*   HEMOGLOBIN g/dL 12.6*   HEMATOCRIT % 40.8   PLATELETS 10*3/mm3 146     Results from last 7 days   Lab Units 12/13/19  0600 12/10/19  0513 12/09/19  1136 12/09/19  1131   SODIUM mmol/L 140 139  --   --    POTASSIUM mmol/L 3.2* 3.6  --   --    CHLORIDE mmol/L 103 102  --   --    CO2 mmol/L 24.0 25.0  --   --    BUN mg/dL 31* 23  --   --    CREATININE mg/dL 1.00 1.19  --  1.60*   CALCIUM mg/dL 8.1* 9.0  --   --    ALT (SGPT) U/L  --   --  16  --    AST (SGOT) U/L  --   --  21  --    GLUCOSE mg/dL 110* 86  --   --        Imaging Results (Last 24 Hours)     Procedure Component Value Units Date/Time    Fiberoptic Endo (fees) [[id number]] Resulted:  12/13/19 1101     Updated:  12/13/19 1101    Narrative:       This procedure was  auto-finalized with no dictation required.          Assessment:  Multiple embolic strokes presumably secondary to atrial fibrillation    Plan:  Rehab versus home with outpatient therapy    Comment:  Overall it looks like he will still need inpatient rehabilitation         I discussed the patients findings and my recommendations with patient, family and nursing staff    Joey Javier MD  12/13/19  12:43 PM

## 2019-12-13 NOTE — PROGRESS NOTES
Patient is on Apixaban.  Education provided on 12/13 verbally and in writing.  Discussed effects of medication,  drug-drug and drug-food interactions, and signs/symptoms of bleeding and clotting.  Patient verbalized understanding through teach back.  All pertinent questions were answered.      Thanks  Gaurav Siddiqui, PharmD  PGY1 Resident  12/13/2019  1:19 PM

## 2019-12-13 NOTE — THERAPY TREATMENT NOTE
Patient Name: Tho Kohli  : 10/8/1927    MRN: [id number]                              Today's Date: 2019       Admit Date: 2019    Visit Dx:     ICD-10-CM ICD-9-CM   1. Dysarthria R47.1 784.51   2. Left-sided weakness R53.1 728.87   3. Impaired mobility and ADLs Z74.09 799.89   4. Oropharyngeal dysphagia R13.12 787.22   5. 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 Acute CVA (cerebrovascular accident) (CMS/[facility]) I63.9 434.91     Patient Active Problem List   Diagnosis   • Stroke (CMS/[facility])   • Essential hypertension   • Dysarthria   • SALVADOR (acute kidney injury) (CMS/[facility])   • Grade III diastolic dysfunction   • Elevated troponin   • Abnormal EKG     Past Medical History:   Diagnosis Date   • OAB (overactive bladder)      History reviewed. No pertinent surgical history.  General Information     Row Name 19 1444          PT Evaluation Time/Intention    Document Type  therapy note (daily note)  -AA     Mode of Treatment  physical therapy  -AA     Row Name 19 1444          General Information    Patient Profile Reviewed?  yes  -AA     Existing Precautions/Restrictions  fall  -AA     Row Name 19 1444          Cognitive Assessment/Intervention- PT/OT    Orientation Status (Cognition)  oriented x 4  -AA     Row Name 19 1444          Safety Issues, Functional Mobility    Impairments Affecting Function (Mobility)  balance;endurance/activity tolerance;strength;coordination  -AA       User Key  (r) = Recorded By, (t) = Taken By, (c) = Cosigned By    Initials Name Provider Type    AA Aneta Centeno, PT Physical Therapist        Mobility     Row Name 19 1444          Bed Mobility Assessment/Treatment    Bed Mobility Assessment/Treatment  rolling left;supine-sit  -AA     Rolling Left [place] (Bed Mobility)  contact guard;verbal cues  -AA     Sit-Supine [place] (Bed Mobility)  moderate assist (50% patient effort);verbal cues  -AA     Assistive Device (Bed Mobility)   bed rails;head of bed elevated  -AA     Row Name 12/13/19 1444          Transfer Assessment/Treatment    Comment (Transfers)  VC for push off and reaching back.  VC for posture and B knee extension.  STS from EOB x2 with min A, toilet mod A, and recliner x3 with min to mod A  -AA     Row Name 12/13/19 1444          Bed-Chair Transfer    Bed-Chair [place] (Transfers)  minimum assist (75% patient effort);verbal cues  -AA     Assistive Device (Bed-Chair Transfers)  walker, front-wheeled  -AA     Row Name 12/13/19 1444          Sit-Stand Transfer    Sit-Stand [place] (Transfers)  moderate assist (50% patient effort);verbal cues;minimum assist (75% patient effort)  -AA     Assistive Device (Sit-Stand Transfers)  walker, front-wheeled  -AA     Row Name 12/13/19 1444          Gait/Stairs Assessment/Training    Gait/Stairs Assessment/Training  gait/ambulation independence  -AA     [place] Level (Gait)  minimum assist (75% patient effort);verbal cues  -AA     Assistive Device (Gait)  walker, front-wheeled  -AA     Distance in Feet (Gait)  23'x2  -AA     Pattern (Gait)  step-to;step-through  -AA     Deviations/Abnormal Patterns (Gait)  bilateral deviations;stride length decreased;gait speed decreased;nori decreased;ataxic  -AA     Bilateral Gait Deviations  forward flexed posture;heel strike decreased;weight shift ability decreased  -AA     Comment (Gait/Stairs)  Pt require cues for LLE step length and height with pt able to correct with cues.  VC for turn sequencing  -AA       User Key  (r) = Recorded By, (t) = Taken By, (c) = Cosigned By    Initials Name Provider Type    Aneta Masters PT Physical Therapist        Obj/Interventions     Row Name 12/13/19 1444          Static Sitting Balance    Level of [place] (Unsupported Sitting, Static Balance)  contact guard assist  -AA     Sitting Position (Unsupported Sitting, Static Balance)  sitting on edge of bed  -AA     Time Able to Maintain Position  (Unsupported Sitting, Static Balance)  more than 5 minutes  -AA     Row Name 12/13/19 1444          Dynamic Sitting Balance    Level of [place], Reaches Outside Midline (Sitting, Dynamic Balance)  minimal assist, 75% patient effort  -AA     Sitting Position, Reaches Outside Midline (Sitting, Dynamic Balance)  other (see comments) toilet  -AA     Comment, Reaches Outside Midline (Sitting, Dynamic Balance)  completing clean up after BM  -AA     Row Name 12/13/19 1444          Static Standing Balance    Level of [place] (Supported Standing, Static Balance)  minimal assist, 75% patient effort  -AA     Assistive Device Utilized (Supported Standing, Static Balance)  walker, rolling  -AA     Row Name 12/13/19 1444          Dynamic Standing Balance    Level of [place], Reaches Outside Midline (Standing, Dynamic Balance)  moderate assist, 50 to 74% patient effort;minimal assist, 75% patient effort  -AA     Assistive Device Utilized (Supported Standing, Dynamic Balance)  walker, rolling  -AA       User Key  (r) = Recorded By, (t) = Taken By, (c) = Cosigned By    Initials Name Provider Type    Aneta Masters PT Physical Therapist        Goals/Plan    No documentation.       Clinical Impression     Row Name 12/13/19 1444          Pain Assessment    Additional Documentation  Pain Scale: Numbers Pre/Post-Treatment (Group)  -AA     Row Name 12/13/19 1444          Pain Scale: Numbers Pre/Post-Treatment    Pain Scale: Numbers, Pretreatment  0/10 - no pain  -AA     Pain Scale: Numbers, Post-Treatment  0/10 - no pain  -AA     Row Name 12/13/19 1442          Plan of Care Review    Plan of Care Reviewed With  patient;son  -AA     Row Name 12/13/19 1444          Vital Signs    Pretreatment Heart Rate (beats/min)  90  -AA     Intratreatment Heart Rate (beats/min)  129  -AA     Posttreatment Heart Rate (beats/min)  102  -AA     Pre Patient Position  Supine  -AA     Intra Patient Position  Standing  -AA     Post Patient  Position  Sitting  -AA     [facility] Name 12/13/19 1444          Positioning and Restraints    Pre-Treatment Position  in bed  -AA     Post Treatment Position  chair  -AA     In Chair  notified nsg;exit alarm on;waffle cushion;reclined;with family/caregiver;call light within reach;encouraged to call for assist  -AA       User Key  (r) = Recorded By, (t) = Taken By, (c) = Cosigned By    Initials Name Provider Type    Aneta Masters, PT Physical Therapist        Outcome Measures     Row Name 12/13/19 1444          How much help from another person do you currently need...    Turning from your back to your side while in flat bed without using bedrails?  3  -AA     Moving from lying on back to sitting on the side of a flat bed without bedrails?  2  -AA     Moving to and from a bed to a chair (including a wheelchair)?  2  -AA     Standing up from a chair using your arms (e.g., wheelchair, bedside chair)?  2  -AA     Climbing 3-5 steps with a railing?  1  -AA     To walk in hospital room?  2  -AA     AM-[facility] 6 Clicks Score (PT)  12  -[facility] Name 12/13/19 1444          Modified [place] Scale    Pre-Stroke Modified [place] Scale  1 - No significant disability despite symptoms.  Able to carry out all usual duties and activities.  -AA     Modified [place]iz Scale  4 - Moderately severe disability.  Unable to walk without assistance, and unable to attend to own bodily needs without assistance.  -[facility] Name 12/13/19 1444          Functional Assessment    Outcome Measure Options  AM-PAC 6 Clicks Basic Mobility (PT);Modified [place]  -       User Key  (r) = Recorded By, (t) = Taken By, (c) = Cosigned By    Initials Name Provider Type    Aneta Masters PT Physical Therapist          PT Recommendation and Plan  Planned Therapy Interventions (PT Eval): balance training, bed mobility training, gait training, home exercise program, patient/family education, strengthening, transfer training  Outcome Summary/Treatment Plan  (PT)  Anticipated Discharge Disposition (PT): inpatient rehabilitation facility  Plan of Care Reviewed With: patient, son  Progress: improving  Outcome Summary: Pt perform STS transfer training from EOB and recliner with min to mod A and from low toilet mod to max A and RW.  Pt ambulated 23'x2 with RW and min to mod A.  Pt continue to requrie cues attention to LLE.  Recommend DC to IRF when appropriate     Time Calculation:   PT Charges     Row Name 12/13/19 1444             Time Calculation    Start Time  1444  -AA      PT Received On  12/13/19  -AA      PT Goal Re-Cert Due Date  12/20/19  -AA         Time Calculation- PT    Total Timed Code Minutes- PT  45 minute(s)  -AA         Timed Charges    19113 - Gait Training Minutes   18  -AA      58942 - PT Therapeutic Activity Minutes  27  -AA        User Key  (r) = Recorded By, (t) = Taken By, (c) = Cosigned By    Initials Name Provider Type    Aneta Masters, PT Physical Therapist        Therapy Charges for Today     Code Description Service Date Service Provider Modifiers Qty    91317360973 HC GAIT TRAINING EA 15 MIN 12/13/2019 Aneta Centeno, PT GP 1    76074057867 HC PT THERAPEUTIC ACT EA 15 MIN 12/13/2019 Aneta Centeno, PT GP 2          PT G-Codes  Outcome Measure Options: AM-PAC 6 Clicks Basic Mobility (PT), Modified Dickens  AM-PAC 6 Clicks Score (PT): 12  AM-PAC 6 Clicks Score (OT): 14  Modified [place] Scale: 4 - Moderately severe disability.  Unable to walk without assistance, and unable to attend to own bodily needs without assistance.    Aneta Centeno PT  12/13/2019

## 2019-12-13 NOTE — PLAN OF CARE
Room air. NSR. No c/o pain. In and out cath used for urine retention, urine was red tinged. Pt confused to situation and place. Will continue to monitor.

## 2019-12-13 NOTE — THERAPY TREATMENT NOTE
Acute Care - Speech Language Pathology Treatment Note  [facility]     Patient Name: Tho Kohli  : 10/8/1927  MRN: [id number]  Today's Date: 2019         Admit Date: 2019    Visit Dx:      ICD-10-CM ICD-9-CM   1. Dysarthria R47.1 784.51   2. Left-sided weakness R53.1 728.87   3. Impaired mobility and ADLs Z74.09 799.89   4. Oropharyngeal dysphagia R13.12 787.22   5. 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 Acute CVA (cerebrovascular accident) (CMS/Formerly [facility]) I63.9 434.91     Patient Active Problem List   Diagnosis   • Stroke (CMS/Formerly [facility])   • Essential hypertension   • Dysarthria   • SALVADOR (acute kidney injury) (CMS/Formerly [facility])   • Grade III diastolic dysfunction   • Elevated troponin   • Abnormal EKG        Therapy Treatment  Rehabilitation Treatment Summary     Row Name 19             Treatment Time/Intention    Discipline  speech language pathologist  (Pended)   -CW      Document Type  therapy note (daily note)  (Pended)   -CW      Subjective Information  no complaints  (Pended)   -CW      Mode of Treatment  speech-language pathology  (Pended)   -CW      Patient/Family Observations  No family present   (Pended)   -CW      Care Plan Review  evaluation/treatment results reviewed;care plan/treatment goals reviewed;risks/benefits reviewed;current/potential barriers reviewed;patient/other agree to care plan  (Pended)   -CW      Therapy Frequency (Swallow)  5 days per week  (Pended)   -CW      Therapy Frequency (SLP SLC)  5 days per week  (Pended)   -CW      Patient Effort  good  (Pended)   -CW      Recorded by [CW] Deanne Baker, Speech Therapy Student 19      Row Name 19             Pain Assessment    Additional Documentation  Pain Scale: Numbers Pre/Post-Treatment (Group)  (Pended)   -CW      Recorded by [CW] Deanne Baker, Speech Therapy Student 19      Row Name 19             Pain Scale: Numbers Pre/Post-Treatment    Pain Scale: Numbers,  Pretreatment  0/10 - no pain  (Pended)   -      Pain Scale: Numbers, Post-Treatment  0/10 - no pain  (Pended)   -CW      Recorded by [CW] Deanne Baker, Speech Therapy Student 12/13/19 1001      Row Name 12/13/19 0920             Outcome Summary/Treatment Plan (SLP)    Daily Summary of Progress (SLP)  progress toward functional goals is good  (Pended)   -CW      Plan for Continued Treatment (SLP)  Pt not using chin tuck independently, requiring min cues. No over s/sxs of aspiration w/ honey thick liquids and puree. Completed dysphagia exercises 5-10x. F/U w/ FEES for possible diet upgrade. Speech is 100% intelligible, however, pt reports it is quieter and less clear than his baseline. Continue to address dysphagia and cognitive-communication in tx.   (Pended)   -CW      Anticipated Dischage Disposition  unknown;anticipate therapy at next level of care  (Pended)   -CW      Recorded by [CW] Deanne Baker, Speech Therapy Student 12/13/19 1001        User Key  (r) = Recorded By, (t) = Taken By, (c) = Cosigned By    Initials Name Effective Dates Discipline    CW Deanne Baker, Speech Therapy Student 08/19/19 -  SLP          EDUCATION  The patient has been educated in the following areas:   Cognitive Impairment Communication Impairment Dysphagia (Swallowing Impairment) Modified Diet Instruction.    SLP Recommendation and Plan  Daily Summary of Progress (SLP): (P) progress toward functional goals is good     Plan for Continued Treatment (SLP): (P) Pt not using chin tuck independently, requiring min cues. No over s/sxs of aspiration w/ honey thick liquids and puree. Completed dysphagia exercises 5-10x. F/U w/ FEES for possible diet upgrade. Speech is 100% intelligible, however, pt reports it is quieter and less clear than his baseline. Continue to address dysphagia and cognitive-communication in tx.   Anticipated Dischage Disposition: (P) unknown, anticipate therapy at next level of care             SLP GOALS      Row Name 12/13/19 0920 12/12/19 1515 12/10/19 1420       Oral Nutrition/Hydration Goal 1 (SLP)    Oral Nutrition/Hydration Goal 1, SLP  LTG: Pt will return to regular diet and thin liquids w/ 100% accuracy w/o cues  (Pended)   -CW  LTG: Pt will return to regular diet and thin liquids w/ 100% accuracy w/o cues  -CH  LTG: Pt will return to regular diet and thin liquids w/ 100% accuracy w/o cues  -RD    Time Frame (Oral Nutrition/Hydration Goal 1, SLP)  by discharge  (Pended)   -CW  by discharge  -CH  by discharge  -RD    Progress/Outcomes (Oral Nutrition/Hydration Goal 1, SLP)  continuing progress toward goal  (Pended)   -CW  continuing progress toward goal  -CH  --       Oral Nutrition/Hydration Goal 2 (SLP)    Oral Nutrition/Hydration Goal 2, SLP  Pt will tolerate level 3-pureed w/ some mashed and honey-thick liquids w/ compensations w/ no overt s/s of aspiration w/ 100% accuracy w/o cues  (Pended)   -CW  Pt will tolerate level 3-pureed w/ some mashed and honey-thick liquids w/ compensations w/ no overt s/s of aspiration w/ 100% accuracy w/o cues  -CH  Pt will tolerate level 3-pureed w/ some mashed and honey-thick liquids w/ compensations w/ no overt s/s of aspiration w/ 100% accuracy w/o cues  -RD    Time Frame (Oral Nutrition/Hydration Goal 2, SLP)  short term goal (STG);by discharge  (Pended)   -CW  short term goal (STG);by discharge  -CH  short term goal (STG);by discharge  -RD    Barriers (Oral Nutrition/Hydration Goal 2, SLP)  Tolerating pureed diet and honey thick liquids w/o s/sxs of aspiration.   (Pended)   -CW  Patient tolerating pureed, some mashed and HT liquids. Wet vocals noted x 2 with patient requiring verbal cueing for throat clear/reswallow.   -CH  --    Progress/Outcomes (Oral Nutrition/Hydration Goal 2, SLP)  continuing progress toward goal  (Pended)   -CW  continuing progress toward goal  -CH  --       Lingual Strengthening Goal 1 (SLP)    Activity (Lingual Strengthening Goal 1, SLP)   increase lingual tone/sensation/control/coordination/movement;increase tongue back strength  (Pended)   -CW  increase lingual tone/sensation/control/coordination/movement;increase tongue back strength  -CH  increase lingual tone/sensation/control/coordination/movement;increase tongue back strength  -RD    Increase Lingual Tone/Sensation/Control/Coordination/Movement  lingual movement exercises  (Pended)   -CW  lingual movement exercises  -CH  lingual movement exercises  -RD    Increase Tongue Back Strength  lingual resistance exercises  (Pended)   -CW  lingual resistance exercises  -CH  lingual resistance exercises  -RD    [place]/Accuracy (Lingual Strengthening Goal 1, SLP)  with minimal cues (75-90% accuracy)  (Pended)   -CW  with minimal cues (75-90% accuracy)  -CH  with minimal cues (75-90% accuracy)  -RD    Time Frame (Lingual Strengthening Goal 1, SLP)  short term goal (STG);by discharge  (Pended)   -CW  short term goal (STG);by discharge  -CH  short term goal (STG);by discharge  -RD    Barriers (Lingual Strengthening Goal 1, SLP)  --  Patient completed exercises with mod cues and ST models x 3 each  -CH  --    Progress/Outcomes (Lingual Strengthening Goal 1, SLP)  goal ongoing  (Pended)   -CW  continuing progress toward goal  -CH  --       Pharyngeal Strengthening Exercise Goal 1 (SLP)    Activity (Pharyngeal Strengthening Goal 1, SLP)  increase timing;increase closure at entrance to airway/closure of airway at glottis;increase squeeze/positive pressure generation;increase tongue base retraction;increase PES opening  (Pended)   -CW  increase timing;increase closure at entrance to airway/closure of airway at glottis;increase squeeze/positive pressure generation;increase tongue base retraction;increase PES opening  -CH  increase timing;increase closure at entrance to airway/closure of airway at glottis;increase squeeze/positive pressure generation;increase tongue base retraction;increase PES opening  -RD     Increase Timing  prepping - 3 second prep or suck swallow or 3-step swallow  (Pended)   -CW  prepping - 3 second prep or suck swallow or 3-step swallow  -CH  prepping - 3 second prep or suck swallow or 3-step swallow  -RD    Increase Closure at Entrance to Airway/Closure of Airway at Glottis  super-supraglottic swallow  (Pended)   -CW  super-supraglottic swallow  -CH  super-supraglottic swallow  -RD    Increase Squeeze/Positive Pressure Generation  effortful pitch glide (falsetto + pharyngeal squeeze)  (Pended)   -CW  effortful pitch glide (falsetto + pharyngeal squeeze)  -CH  effortful pitch glide (falsetto + pharyngeal squeeze)  -RD    Increase Tongue Base Retraction  hard effortful swallow;williams  (Pended)   -CW  hard effortful swallow;williams  -CH  hard effortful swallow;williams  -RD    Increase PES Opening  chin tuck against resistance (CTAR)  (Pended)   -CW  chin tuck against resistance (CTAR)  -CH  chin tuck against resistance (CTAR)  -RD    Juana Diaz/Accuracy (Pharyngeal Strengthening Goal 1, SLP)  with minimal cues (75-90% accuracy)  (Pended)   -CW  with minimal cues (75-90% accuracy)  -CH  with minimal cues (75-90% accuracy)  -RD    Time Frame (Pharyngeal Strengthening Goal 1, SLP)  short term goal (STG);by discharge  (Pended)   -CW  short term goal (STG);by discharge  -CH  short term goal (STG);by discharge  -RD    Barriers (Pharyngeal Strengthening Goal 1, SLP)  Completed effortful swallow and effortful pitch glide x5, 15 sec CTAR hold x1, and CTAR repetative x 10.   (Pended)   -CW  Patient completed exercises with mod cues and ST models x 3 each  -CH  --    Progress/Outcomes (Pharyngeal Strengthening Goal 1, SLP)  continuing progress toward goal  (Pended)   -CW  continuing progress toward goal  -CH  --       Swallow Management Recall Goal 1 (SLP)    Activity (Swallow Management Recall Goal 1, SLP)  recall of;safe diet level/texture;safe liquid viscosity;compensatory swallow  strategies/techniques;postural techniques;rationale for use of strategies/techniques  (Pended)   -CW  recall of;safe diet level/texture;safe liquid viscosity;compensatory swallow strategies/techniques;postural techniques;rationale for use of strategies/techniques  -CH  recall of;safe diet level/texture;safe liquid viscosity;compensatory swallow strategies/techniques;postural techniques;rationale for use of strategies/techniques  -RD    Coal/Accuracy (Swallow Management Recall Goal 1, SLP)  with minimal cues (75-90% accuracy)  (Pended)   -CW  with minimal cues (75-90% accuracy)  -CH  with minimal cues (75-90% accuracy)  -RD    Time Frame (Swallow Management Recall Goal 1, SLP)  short term goal (STG);by discharge  (Pended)   -CW  short term goal (STG);by discharge  -CH  short term goal (STG);by discharge  -RD    Barriers (Swallow Management Recall Goal 1, SLP)  Required min cues to recall chin tuck posture  (Pended)   -CW  Patient required min cues for recall of diet consistency/liquid consistency  -CH  --    Progress/Outcomes (Swallow Management Recall Goal 1, SLP)  continuing progress toward goal  (Pended)   -CW  continuing progress toward goal  -CH  --       Swallow Compensatory Strategies Goal 1 (SLP)    Activity (Swallow Compensatory Strategies/Techniques Goal 1, SLP)  compensatory strategies;postural techniques;during meal intake;small bites;small cup sips;chin tuck posture;other (see comments)  (Pended)   -CW  compensatory strategies;postural techniques;during meal intake;small bites;small cup sips;chin tuck posture;other (see comments)  -CH  compensatory strategies;postural techniques;during meal intake;small bites;small cup sips;chin tuck posture;other (see comments) L lingual sweep w/ solids  -RD    Coal/Accuracy (Swallow Compensatory Strategies/Techniques Goal 1, SLP)  independently (over 90% accuracy)  (Pended)   -CW  independently (over 90% accuracy)  -CH  independently (over 90% accuracy)   -RD    Time Frame (Swallow Compensatory Strategies/Techniques Goal 1, SLP)  short term goal (STG);by discharge  (Pended)   -CW  short term goal (STG);by discharge  -CH  short term goal (STG);by discharge  -RD    Barriers (Swallow Compensatory Strategies/Techniques Goal 1, SLP)  Required min cues to complete chin tuck w/ PO trials   (Pended)   -CW  Patient required min cues for recall of need for chin tuck and sm single bites and sips.  -CH  --    Progress/Outcomes (Swallow Compensatory Strategies/Techniques Goal 1, SLP)  continuing progress toward goal  (Pended)   -CW  continuing progress toward goal  -CH  --       Phonation Goal 1 (SLP)    Improve Phonation By Goal 1 (SLP)  using loud speech;80%;with minimal cues (75-90%)  (Pended)   -CW  using loud speech;80%;with minimal cues (75-90%)  -CH  using loud speech;80%;with minimal cues (75-90%)  -RD    Time Frame (Phonation Goal 1, SLP)  short term goal (STG);by discharge  (Pended)   -CW  short term goal (STG);by discharge  -CH  short term goal (STG);by discharge  -RD    Progress (Phonation Goal 1, SLP)  50%;with minimal cues (75-90%)  (Pended)   -CW  50%;with moderate cues (50-74%)  -CH  --    Progress/Outcomes (Phonation Goal 1, SLP)  continuing progress toward goal  (Pended)   -CW  continuing progress toward goal  -CH  --       Reduce Perception of Hypernasality Goal 1 (SLP)    Reduce Perception of Hypernasality By Goal 1 (SLP)  opening mouth wider for speech;80%;with minimal cues (75-90%)  (Pended)   -CW  opening mouth wider for speech;80%;with minimal cues (75-90%)  -CH  opening mouth wider for speech;80%;with minimal cues (75-90%)  -RD    Time Frame (Perception of Hypernasality Goal 1, SLP)  short term goal (STG);by discharge  (Pended)   -CW  short term goal (STG);by discharge  -CH  short term goal (STG);by discharge  -RD    Progress (Perception of Hypernasality Goal 1, SLP)  50%;with minimal cues (75-90%)  (Pended)   -CW  50%;with moderate cues (50-74%)  -CH   --    Progress/Outcomes (Perception of Hypernasality Goal 1, SLP)  continuing progress toward goal  (Pended)   -CW  continuing progress toward goal  -CH  --       Articulation Goal 1 (SLP)    Improve Articulation Goal 1 (SLP)  of specific sounds in connected speech;by over-articulating in connected speech;80%;with minimal cues (75-90%)  (Pended)   -CW  of specific sounds in connected speech;by over-articulating in connected speech;80%;with minimal cues (75-90%)  -CH  of specific sounds in connected speech;by over-articulating in connected speech;80%;with minimal cues (75-90%)  -RD    Time Frame (Articulation Goal 1, SLP)  short term goal (STG);by discharge  (Pended)   -CW  short term goal (STG);by discharge  -CH  short term goal (STG);by discharge  -RD    Progress (Articulation Goal 1, SLP)  50%;with minimal cues (75-90%)  (Pended)   -CW  --  --    Progress/Outcomes (Articulation Goal 1, SLP)  continuing progress toward goal  (Pended)   -CW  goal ongoing  -CH  --       Memory Skills Goal 1 (SLP)    Improve Memory Skills Through Goal 1 (SLP)  recalling unrelated word lists with an imposed delay;80%;with minimal cues (75-90%)  (Pended)   -CW  recalling unrelated word lists with an imposed delay;80%;with minimal cues (75-90%)  -CH  recalling unrelated word lists with an imposed delay;80%;with minimal cues (75-90%)  -RD    Time Frame (Memory Skills Goal 1, SLP)  short term goal (STG);by discharge  (Pended)   -CW  short term goal (STG);by discharge  -CH  short term goal (STG);by discharge  -RD    Progress (Memory Skills Goal 1, SLP)  70%;with minimal cues (75-90%)  (Pended)   -CW  --  --    Progress/Outcomes (Memory Skills Goal 1, SLP)  continuing progress toward goal  (Pended)   -CW  goal ongoing  -CH  --       Executive Functional Skills Goal 1 (SLP)    Improve Executive Function Skills Goal 1 (SLP)  demonstrate awareness of deficit;identify strategies, strengths, limitations;identify anticipated needs;80%;with minimal  cues (75-90%)  (Pended)   -CW  demonstrate awareness of deficit;identify strategies, strengths, limitations;identify anticipated needs;80%;with minimal cues (75-90%)  -CH  demonstrate awareness of deficit;identify strategies, strengths, limitations;identify anticipated needs;80%;with minimal cues (75-90%)  -RD    Time Frame (Executive Function Skills Goal 1, SLP)  short term goal (STG);by discharge  (Pended)   -CW  short term goal (STG);by discharge  -CH  short term goal (STG);by discharge  -RD    Progress (Executive Function Skills Goal 1, SLP)  --  70%;with moderate cues (50-74%)  -CH  --    Progress/Outcomes (Executive Function Skills Goal 1, SLP)  goal ongoing  (Pended)   -CW  continuing progress toward goal  -CH  --       Additional Goal 1 (SLP)    Additional Goal 1, SLP  LTG: Pt will improve cognitive-communication skills to actively participate in care w/ 100% accuracy w/o cues.   (Pended)   -CW  LTG: Pt will improve cognitive-communication skills to actively participate in care w/ 100% accuracy w/o cues.   -CH  LTG: Pt will improve cognitive-communication skills to actively participate in care w/ 100% accuracy w/o cues.   -RD    Time Frame (Additional Goal 1, SLP)  by discharge  (Pended)   -CW  by discharge  -CH  by discharge  -RD    Progress/Outcomes (Additional Goal 1, SLP)  continuing progress toward goal  (Pended)   -CW  continuing progress toward goal  -CH  --      User Key  (r) = Recorded By, (t) = Taken By, (c) = Cosigned By    Initials Name Provider Type    Winnie Kaur, MS CCC-SLP Speech and Language Pathologist    Catalina Villavicencio, MS CCC-SLP Speech and Language Pathologist    CW Deanne Baker, Speech Therapy Student Speech Therapy Student              Time Calculation:     Time Calculation- SLP     Row Name 12/13/19 1007             Time Calculation- SLP    SLP Start Time  0920  (Pended)   -CW      SLP Received On  12/13/19  (Pended)   -CW        User Key  (r) = Recorded By, (t) =  Taken By, (c) = Cosigned By    Initials Name Provider Type    CW Deanne Baker, Speech Therapy Student Speech Therapy Student          Therapy Charges for Today     Code Description Service Date Service Provider Modifiers Qty    22009387220 HC ST TREATMENT SWALLOW 2 12/13/2019 Deanne Baker, Speech Therapy Student GN 1    [id number] HC ST TREATMENT SPEECH 2 12/13/2019 Deanne Baker, Speech Therapy Student GN 1                     Deanne Baker, Speech Therapy Student  12/13/2019

## 2019-12-13 NOTE — PROGRESS NOTES
Continued Stay Note  [facility]     Patient Name: Tho Kohli  MRN: [id number]  Today's Date: 12/13/2019    Admit Date: 12/9/2019    Discharge Plan     Row Name 12/13/19 1819       Plan    Plan  Inpatient rehab at [facility]    Patient/Family in Agreement with Plan  yes    Plan Comments  Spoke with patient and family at bedside.   Patient transferring to [facility] on 12/14 by [employer] wheelchair van.  [employer] [facility] will be private pay for patient,  and has been scheduled for 1130 on 12/4 with trip number [id number].  Patient will transfer to Stroke unit on [facility].      Final Discharge Disposition Code  62 - inpatient rehab facility        Discharge Codes    No documentation.       Expected Discharge Date and Time     Expected Discharge Date Expected Discharge Time    Dec 14, 2019             Elvira Pearson RN

## 2019-12-13 NOTE — PLAN OF CARE
Problem: Patient Care Overview  Goal: Plan of Care Review  Outcome: Ongoing (interventions implemented as appropriate)  Flowsheets (Taken 12/13/2019 1807)  Progress: improving  Outcome Summary: Pt perform STS transfer training from EOB and recliner with min to mod A and from low toilet mod to max A and RW.  Pt ambulated 23'x2 with RW and min to mod A.  Pt continue to requrie cues attention to LLE.  Recommend DC to IRF when appropriate

## 2019-12-13 NOTE — MBS/VFSS/FEES
Acute Care - Speech Language Pathology   Swallow Re-Evaluation [facility]   Fiberoptic Endoscopic Evaluation of Swallowing (FEES)     Patient Name: Tho Kohli  : 10/8/1927  MRN: [id number]  Today's Date: 2019               Admit Date: 2019    Visit Dx:     ICD-10-CM ICD-9-CM   1. Dysarthria R47.1 784.51   2. Left-sided weakness R53.1 728.87   3. Impaired mobility and ADLs Z74.09 799.89   4. Oropharyngeal dysphagia R13.12 787.22   5. 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 Acute CVA (cerebrovascular accident) (CMS/[facility]) I63.9 434.91     Patient Active Problem List   Diagnosis   • Stroke (CMS/[facility])   • Essential hypertension   • Dysarthria   • SALVADOR (acute kidney injury) (CMS/[facility])   • Grade III diastolic dysfunction   • Elevated troponin   • Abnormal EKG     Past Medical History:   Diagnosis Date   • OAB (overactive bladder)      History reviewed. No pertinent surgical history.     SWALLOW EVALUATION (last 72 hours)      SLP Adult Swallow Evaluation     Row Name 19 1030 19 1300 12/10/19 1420             Rehab Evaluation    Document Type  re-evaluation  (Pended)   -CW  re-evaluation  -AW  evaluation  -RD      Subjective Information  no complaints  (Pended)   -CW  fatigue  -AW  no complaints  -RD      Patient Observations  alert;cooperative;agree to therapy  (Pended)   -CW  lethargic  -AW  alert;cooperative;agree to therapy  -RD      Patient/Family Observations  No family present   (Pended)   -CW  son present  -AW  No family present  -RD      Patient Effort  good  (Pended)   -CW  fair  -AW  good  -RD         General Information    Patient Profile Reviewed  yes  (Pended)   -CW  yes  -AW  yes  -RD      Pertinent History Of Current Problem  89 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 Hx HTN, SDH 2007, OAB. Lives at assisted living but independent w/ ADLs.   (Pended)   -CW  --  Adm w/ Bilateral CVAs-embolic. Per stroke protocol. Failed RN dys screen. HTN, DH 2007, SALVADOR, OAB. Lives @  assisted living facility, but independent w/ ADLs.   -RD      Current Method of Nutrition  pureed with some mashed;honey-thick liquids  (Pended)   -CW  pureed with some mashed;honey-thick liquids RN said MD wanted re-eval following EGD this am  -AW  mechanical soft, no mixed consistencies;nectar/syrup-thick liquids  -RD      Precautions/Limitations, Vision  vision impairment, bilaterally;corrective lenses needed for reading  (Pended)   -CW  --  corrective lenses needed for reading;vision impairment, bilaterally  -RD      Precautions/Limitations, Hearing  WFL;for purposes of eval  (Pended)   -CW  --  WFL;for purposes of eval  -RD      Prior Level of Function-Communication  WFL  (Pended)   -CW  --  WFL  -RD      Prior Level of Function-Swallowing  no diet consistency restrictions  (Pended)   -CW  --  no diet consistency restrictions  -RD      Plans/Goals Discussed with  patient;agreed upon  (Pended)   -CW  patient and family  -AW  patient;agreed upon  -RD      Barriers to Rehab  medically complex  (Pended)   -CW  medically complex  -AW  none identified  -RD      Patient's Goals for Discharge  patient did not state  (Pended)   -CW  --  eat/drink without coughing/choking  -RD      Family Goals for Discharge  --  other (see comments);declined (alternate means of nutrition/hydration)  maintain PO diet  -AW  --         Pain Assessment    Additional Documentation  Pain Scale: FACES Pre/Post-Treatment (Group)  (Pended)   -CW  --  Pain Scale: Numbers Pre/Post-Treatment (Group)  -RD         Pain Scale: Numbers Pre/Post-Treatment    Pain Scale: Numbers, Pretreatment  --  --  0/10 - no pain  -RD      Pain Scale: Numbers, Post-Treatment  --  --  0/10 - no pain  -RD         Pain Scale: FACES Pre/Post-Treatment    Pain: FACES Scale, Pretreatment  0-->no hurt  (Pended)   -CW  0-->no hurt  -AW  --      Pain: FACES Scale, Post-Treatment  0-->no hurt  (Pended)   -CW  0-->no hurt  -AW  --         Oral Motor and Function    Dentition  Assessment  --  upper dentures/partial in place;lower dentures/partial in place  -AW  --      Secretion Management  --  wet vocal quality  -AW  --      Volitional Swallow  --  weak;delayed  -AW  --         General Eating/Swallowing Observations    Eating/Swallowing Skills  --  fed by SLP  -AW  --      Positioning During Eating  --  upright in bed  -AW  --      Utensils Used  --  spoon  -AW  --      Consistencies Trialed  --  honey-thick liquids;pureed  -AW  --         Clinical Swallow Eval    Oral Prep Phase  --  WFL  -AW  --      Oral Transit  --  WFL  -AW  --      Oral Residue  --  WFL  -AW  --      Pharyngeal Phase  --  suspected pharyngeal impairment  -AW  --      Esophageal Phase  --  -- known large hiatal hernia per son  -AW  --      Clinical Swallow Evaluation Summary  --  Re-eval completed. Pt with wet vocal quality and multiple swallows, which is c/w residue seen on MBS yestrday. Pt using chin tuck with all consistencies. Son understands that pt is at risk with all textures, but that if test is repeated it could show the same or worse results. He said they would defintely not want a feeding tube. Will keep on Honey thick, downgrade to full puree L2. We will monitor closely.   -AW  --         Pharyngeal Phase Concerns    Pharyngeal Phase Concerns  --  wet vocal quality;multiple swallows  -AW  --      Wet Vocal Quality  --  honey  -AW  --      Multiple Swallows  --  honey;pudding  -AW  --         MBS/VFSS    Utensils Used  --  --  spoon;cup;straw  -RD      Consistencies Trialed  --  --  thin liquids;nectar/syrup-thick liquids;honey-thick liquids;pudding thick;regular textures  -RD         MBS/VFSS Interpretation    Oral Prep Phase  --  --  impaired oral phase of swallowing  -RD      Oral Transit Phase  --  --  impaired  -RD      Oral Residue  --  --  impaired  -RD         Oral Preparatory Phase    Oral Preparatory Phase  --  --  anterior loss;prolonged manipulation  -RD      Anterior Loss  --  --  all  consistencies tested;secondary to reduced labial seal;other (see comments) somewhat at baseline 2' hx of lingual resection per pt  -RD      Prolonged Manipulation  --  --  pudding/puree;regular textures;secondary to reduced lingual strength;secondary to reduced lingual range of motion  -RD         Oral Transit Phase    Impaired Oral Transit Phase  --  --  increased A-P transit time;premature spillage of liquids into pharynx  -RD      Increased A-P Transit Time  --  --  pudding/puree;regular textures;secondary to reduced lingual control  -RD      Premature Spillage of Liquids into Pharynx  --  --  thin liquids;nectar-thick liquids;honey-thick liquids;secondary to reduced lingual control  -RD         Oral Residue    Impaired Oral Residue  --  --  lingual residue;lateral sulci residue;diffuse residue throughout oral cavity  -RD      Lingual Residue  --  --  all consistencies tested;secondary to reduced lingual strength;secondary to reduced lingual range of motion  -RD      Lateral Sulci Residue  --  --  regular textures;secondary to reduced lingual strength;secondary to reduced lingual range of motion;other (see comments) able to mostly clear w/ cued lingual sweep- L lateral sulci  -RD      Diffuse Residue throughout Oral Cavity  --  --  all consistencies tested;secondary to reduced lingual strength;secondary to reduced lingual range of motion  -RD      Response to Oral Residue  --  --  with compensatory maneuver (see comments);other (see comments) mostly cleared w/ cued lingual sweep + re-swallow  -RD      Oral Residue, Comment  --  --  moderate oral residue (> w/ solids in L lateral sulci- mostly cleared w/ cued lingual sweep + re-swallow). Was losing residue anteriorly on L after the swallow. Best recs for Level 3-pureed w/ some mashed + L lingual sweep.   -RD         Initiation of Pharyngeal Swallow    Initiation of Pharyngeal Swallow  --  --  bolus in pyriform sinuses  -RD      Pharyngeal Phase  --  --  impaired  pharyngeal phase of swallowing  -RD      Penetration During the Swallow  --  --  thin liquids;nectar-thick liquids;honey-thick liquids;pudding/puree;secondary to delayed swallow initiation or mistiming;secondary to reduced vestibular closure  -RD      Aspiration During the Swallow  --  --  thin liquids;nectar-thick liquids;honey-thick liquids;secondary to delayed swallow initiation or mistiming;secondary to reduced vestibular closure  -RD      Penetration After the Swallow  --  --  thin liquids;nectar-thick liquids;secondary to residue;in valleculae  -RD      Aspiration After the Swallow  --  --  thin liquids;nectar-thick liquids;honey-thick liquids;secondary to residue;in laryngeal vestibule  -RD      Response to Penetration  --  --  deep;no response  -RD      Response to Aspiration  --  --  no response, silent aspiration;response with cue only;could not clear subglottic material  -RD      Rosenbek's Scale  --  --  thin:;nectar:;honey:;8--->level 8  -RD      Pharyngeal Residue  --  --  all consistencies tested;base of tongue;valleculae;pyriform sinuses;posterior pharyngeal wall;laryngeal vestibule;diffuse within pharynx;secondary to reduced base of tongue retraction;secondary to reduced posterior pharyngeal wall stripping;other (see comments) reduced PES opening impacting  -RD      Response to Residue  --  --  cleared residue with compensatory maneuver (see comments);other (see comments) mostly  -RD      Attempted Compensatory Maneuvers  --  --  bolus size;bolus presentation style;chin tuck;multiple swallows;throat clear after swallow  -RD      Response to Attempted Compensatory Maneuvers  --  --  prevented aspiration;reduced residue  -RD      Pharyngeal Phase, Comment  --  --  Moderate-severe pharyngeal dysphagia. Penetration/aspiration during w/ continued aspiration of vestibular residue upon consecutive swallows w/ thins, nectar, and honey-thick liquids. Aspiration was silent. Pt was unable to clear aspiration  even w/ cued cough. Penetration occurred x1 w/ pudding. Pre-spill+ delayed initiation + poor vestibular closure contributing to penetration/aspiration. Moderate-severe diffuse pharyngeal residue (> in valleculae and pooling to the pyriform sinuses) w/ all consistencies 2' reduced base of tongue retraction and decr'd pharyngeal stripping (suspect prominent inward curvature of cervical spine somewhat impacting). Multiple compensations attempted. Pt able to prevent aspiration and significantly reduce residue w/ small bites/single sips of honey-thick liquids via tsp/cup and w/ pudding/solids. Chin tuck attempted w/ nectar-thick, but still had deep penetration posing higher aspiration risk given mistiming. Pt would benefit from modified diet w/ compensations + oropharyngeal strengthening during treatment. Pt agreeable to recs. Pt able to teach back compensations w/ min cues x1, but given new memory deficits, will likely need supervision to ensure use of compensations during meals at this time.   -RD         Esophageal Phase    Esophageal Phase  --  --  esophageal retention with retrograde flow through PES  -RD      Esophageal Phase, Comment  --  --  Prominent cricopharyngeus and reduced PES opening impacting. Pt would benefit from reflux precautions  -RD         Fiberoptic Endoscopic Evaluation of Swallowing (FEES)    Risks/Benefits Reviewed  risks/benefits explained;patient;agreed to eval  (Pended)   -CW  --  --      Nasal Entry  left:  (Pended)   -CW  --  --      Special Considerations  Scope #338  (Pended)   -CW  --  --         Anatomy and Physiology    Anatomic Considerations  anatomic deviation observed (see comments)  (Pended)   -CW  --  --      Comment  Prominent posterior pharyngeal wall  (Pended)   -CW  --  --      Velopharyngeal  WFL  (Pended)   -CW  --  --      Base of Tongue  symmetrical  (Pended)   -CW  --  --      Epiglottis  rests posteriorly  (Pended)   -CW  --  --      Laryngeal Function Breathing   symmetrical  (Pended)   -CW  --  --      Laryngeal Function Phonation  symmetrical  (Pended)   -CW  --  --      Laryngeal Function to Breath Hold  TVF/FVF/Arytenoid;sustained closure  (Pended)   -CW  --  --      Secretion Rating Scale (Nakul et al. 1996)  1- secretions present around the laryngeal vestibule  (Pended)   -CW  --  --      Secretion Description  thin;clear  (Pended)   -CW  --  --      Ice Chips  DNA  (Pended)   -CW  --  --      Spontaneous Swallow  frequency adequate  (Pended)   -CW  --  --      Sensory  sensed scope  (Pended)   -CW  --  --      Utensils Used  Spoon;Cup;Straw  (Pended)   -CW  --  --      Consistencies Trialed  thin liquids;nectar-thick liquids;pudding/puree;Regular textures  (Pended)   -CW  --  --         FEES Interpretation    Oral Phase  anterior loss;prespill of liquids into pharynx;oral residue present  (Pended)   -CW  --  --         Initiation of Pharyngeal Swallow    Initiation of Pharyngeal Swallow  bolus in pyriform sinuses  (Pended)   -CW  --  --      Pharyngeal Phase  impaired pharyngeal phase of swallowing  (Pended)   -CW  --  --      Penetration During the Swallow  thin liquids;nectar-thick liquids;secondary to delayed swallow initiation or mistiming;secondary to reduced vestibular closure  (Pended)   -CW  --  --      Penetration After the Swallow  pudding/puree;other (see comments)  (Pended)  During subsequent swallows   -CW  --  --      Aspiration After the Swallow  thin liquids;secondary to residue;in laryngeal vestibule  (Pended)   -CW  --  --      Response to Aspiration  no response, silent aspiration;response with cue only  (Pended)   -CW  --  --      Rosenbek's Scale  thin:;8-->Level 8;nectar:;pudding/puree:;2-->Level 2  (Pended)   -CW  --  --      Residue  valleculae;secondary to reduced base of tongue retraction;secondary to reduced posterior pharyngeal wall stripping  (Pended)   -CW  --  --      Response to Residue  other (see comments)  (Pended)  Reduced w/  multiple swallows   -CW  --  --      Attempted Compensatory Maneuvers  bolus size;bolus presentation style  (Pended)   -CW  --  --      Response to Attempted Compensatory Maneuvers  prevented penetration  (Pended)   -CW  --  --      FEES Summary  Mild oral and mild-moderate pharyngeal dysphagia. Prominent posterior pharyngeal wall upon entry into the larynx. Mild anterior loss noted w liquids, pt reports nerve damage 2' previous procedures. Intermittent premature spillage noted with thin and nectar thick liquids 2' reduced lingual control. Mild diffuse oral residue noted solids 2' reduced lingual strength. Residue reduced with liquid wash and subsequent swallows. Penetration of thin liquids via cup and straw and nectar thick liquids via straw during the swallow 2' prespill, delay, and reduced vestibular closure. Penetration did not clear unless cued to cough. Thin liquid vestibular residue was subsequently aspirated during a spontaneous subsequent swallow. Aspiration was silent.  Penetration of pudding after the swallow after pudding mixed w/ thin liquid residue, no penetration or aspiration appreciated with any other trials of pudding. No aspiration or penetration appreciated w/ regular solids. Chin tuck compensatory strategy was not attempted 2' pt's inability to use consistently. Mild-moderate vallecular residue which would pool to the pyriform sinuses at times 2' reduced base of tongue retraction and decreased pharyngeal wall stripping. Residue would reduce w/ spontaneous multiple swallows. Recommend: Nectar thick liquids, no straws, and dysphagia level IV (mechanical soft, no mixed consistencies). SLP will follow-up to continue dysphagia tx.  (Pended)   -CW  --  --         Clinical Impression    SLP Swallowing Diagnosis  mild;oral dysfunction;mild-moderate;pharyngeal dysfunction  (Pended)   -CW  mild;oral dysfunction;pharyngeal dysfunction  -AW  moderate;oral dysfunction;mod-severe;pharyngeal dysfunction  -RD       Functional Impact  risk of aspiration/pneumonia;risk of malnutrition;risk of dehydration  (Pended)   -CW  risk of aspiration/pneumonia;risk of malnutrition;risk of dehydration  -AW  risk of aspiration/pneumonia  -RD      Rehab Potential/Prognosis, Swallowing  good, to achieve stated therapy goals  (Pended)   -CW  re-evaluate goals as necessary  -AW  good, to achieve stated therapy goals  -RD      Swallow Criteria for Skilled Therapeutic Interventions Met  demonstrates skilled criteria  (Pended)   -CW  demonstrates skilled criteria  -AW  demonstrates skilled criteria  -RD         Recommendations    Therapy Frequency (Swallow)  5 days per week  (Pended)   -CW  5 days per week  -AW  5 days per week  -RD      Predicted Duration Therapy Intervention (Days)  until discharge  (Pended)   -CW  until discharge  -AW  until discharge  -RD      SLP Diet Recommendation  mechanical soft with no mixed consistencies;nectar thick liquids  (Pended)   -CW  puree;honey thick liquids  -AW  puree with some mashed;honey thick liquids;other (see comments) supervision w/ meals to ensure compensations  -RD      Recommended Precautions and Strategies  upright posture during/after eating;small bites of food and sips of liquid;no straw;other (see comments)  (Pended)  General aspiration precautions   -CW  chin Tuck  -AW  chin Tuck;upright posture during/after eating;small bites of food and sips of liquid;no straw;check mouth frequently for oral residue/pocketing;other (see comments) aspiration/reflux precautions; oral care; L lingual sweep  -RD      SLP Rec. for Method of Medication Administration  meds whole;meds crushed;with pudding or applesauce;as tolerated  (Pended)   -CW  meds crushed;with pudding or applesauce  -AW  meds crushed;with pudding or applesauce + chin tuck  -RD      Monitor for Signs of Aspiration  yes;notify SLP if any concerns  (Pended)   -CW  notify SLP if any concerns;yes  -AW  yes;notify SLP if any concerns  -RD       Anticipated Dischage Disposition  unknown;anticipate therapy at next level of care  (Pended)   -CW  inpatient rehabilitation facility  -AW  inpatient rehabilitation facility;anticipate therapy at next level of care  -RD        User Key  (r) = Recorded By, (t) = Taken By, (c) = Cosigned By    Initials Name Effective Dates    AW Danielle Velazquez, MS CCC-SLP 06/22/15 -     RD Winnie Durbin, MS CCC-SLP 04/03/18 -     CW Deanne Baker, Speech Therapy Student 08/19/19 -           EDUCATION  The patient has been educated in the following areas:   Dysphagia (Swallowing Impairment) Modified Diet Instruction.    SLP Recommendation and Plan  SLP Swallowing Diagnosis: (P) mild, oral dysfunction, mild-moderate, pharyngeal dysfunction  SLP Diet Recommendation: (P) mechanical soft with no mixed consistencies, nectar thick liquids  Recommended Precautions and Strategies: (P) upright posture during/after eating, small bites of food and sips of liquid, no straw, other (see comments)(General aspiration precautions )  SLP Rec. for Method of Medication Administration: (P) meds whole, meds crushed, with pudding or applesauce, as tolerated     Monitor for Signs of Aspiration: (P) yes, notify SLP if any concerns     Swallow Criteria for Skilled Therapeutic Interventions Met: (P) demonstrates skilled criteria  Anticipated Dischage Disposition: (P) unknown, anticipate therapy at next level of care  Rehab Potential/Prognosis, Swallowing: (P) good, to achieve stated therapy goals  Therapy Frequency (Swallow): (P) 5 days per week  Predicted Duration Therapy Intervention (Days): (P) until discharge       Plan of Care Reviewed With: (P) parent    SLP GOALS     Row Name 12/13/19 1030 12/13/19 0920 12/12/19 1515       Oral Nutrition/Hydration Goal 1 (SLP)    Oral Nutrition/Hydration Goal 1, SLP  LTG: Pt will return to regular diet and thin liquids w/ 100% accuracy w/o cues  (Pended)   -  LTG: Pt will return to regular diet and thin  liquids w/ 100% accuracy w/o cues  -AC (r) CW (t) AC (c)  LTG: Pt will return to regular diet and thin liquids w/ 100% accuracy w/o cues  -CH    Time Frame (Oral Nutrition/Hydration Goal 1, SLP)  by discharge  (Pended)   -CW  by discharge  -AC (r) CW (t) AC (c)  by discharge  -CH    Progress/Outcomes (Oral Nutrition/Hydration Goal 1, SLP)  continuing progress toward goal  (Pended)   -CW  continuing progress toward goal  -AC (r) CW (t) AC (c)  continuing progress toward goal  -CH       Oral Nutrition/Hydration Goal 2 (SLP)    Oral Nutrition/Hydration Goal 2, SLP  Pt will tolerate nectar thick liquids and soft solids w/o s/sxs of aspiration w/ 100% accuracy w/o cues   (Pended)   -CW  Pt will tolerate level 3-pureed w/ some mashed and honey-thick liquids w/ compensations w/ no overt s/s of aspiration w/ 100% accuracy w/o cues  -AC (r) CW (t) AC (c)  Pt will tolerate level 3-pureed w/ some mashed and honey-thick liquids w/ compensations w/ no overt s/s of aspiration w/ 100% accuracy w/o cues  -CH    Time Frame (Oral Nutrition/Hydration Goal 2, SLP)  short term goal (STG);by discharge  (Pended)   -CW  short term goal (STG);by discharge  -AC (r) CW (t) AC (c)  short term goal (STG);by discharge  -CH    Barriers (Oral Nutrition/Hydration Goal 2, SLP)  --  Tolerating pureed diet and honey thick liquids w/o s/sxs of aspiration.   -AC (r) CW (t) AC (c)  Patient tolerating pureed, some mashed and HT liquids. Wet vocals noted x 2 with patient requiring verbal cueing for throat clear/reswallow.   -CH    Progress/Outcomes (Oral Nutrition/Hydration Goal 2, SLP)  goal revised this date  (Pended)   -CW  continuing progress toward goal  -AC (r) CW (t) AC (c)  continuing progress toward goal  -CH       Oral Nutrition/Hydration Goal (SLP)    Oral Nutrition/Hydration Goal, SLP  Pt will tolerate therapeutic trials of thin H2O w/o s/sxs of aspiration w/ 80% accuracy w/o cues  (Pended)   -CW  --  --    Time Frame (Oral Nutrition/Hydration  Goal, SLP)  short term goal (STG);by discharge  (Pended)   -CW  --  --       Lingual Strengthening Goal 1 (SLP)    Activity (Lingual Strengthening Goal 1, SLP)  increase lingual tone/sensation/control/coordination/movement;increase tongue back strength  (Pended)   -CW  increase lingual tone/sensation/control/coordination/movement;increase tongue back strength  -AC (r) CW (t) AC (c)  increase lingual tone/sensation/control/coordination/movement;increase tongue back strength  -CH    Increase Lingual Tone/Sensation/Control/Coordination/Movement  lingual movement exercises  (Pended)   -CW  lingual movement exercises  -AC (r) CW (t) AC (c)  lingual movement exercises  -CH    Increase Tongue Back Strength  lingual resistance exercises  (Pended)   -CW  lingual resistance exercises  -AC (r) CW (t) AC (c)  lingual resistance exercises  -CH    [place]/Accuracy (Lingual Strengthening Goal 1, SLP)  with minimal cues (75-90% accuracy)  (Pended)   -CW  with minimal cues (75-90% accuracy)  -AC (r) CW (t) AC (c)  with minimal cues (75-90% accuracy)  -CH    Time Frame (Lingual Strengthening Goal 1, SLP)  short term goal (STG);by discharge  (Pended)   -CW  short term goal (STG);by discharge  -AC (r) CW (t) AC (c)  short term goal (STG);by discharge  -    Barriers (Lingual Strengthening Goal 1, SLP)  --  --  Patient completed exercises with mod cues and ST models x 3 each  -CH    Progress/Outcomes (Lingual Strengthening Goal 1, SLP)  goal ongoing  (Pended)   -CW  goal ongoing  -AC (r) CW (t) AC (c)  continuing progress toward goal  -CH       Pharyngeal Strengthening Exercise Goal 1 (SLP)    Activity (Pharyngeal Strengthening Goal 1, SLP)  increase timing;increase closure at entrance to airway/closure of airway at glottis;increase squeeze/positive pressure generation;increase tongue base retraction;increase PES opening  (Pended)   -CW  increase timing;increase closure at entrance to airway/closure of airway at glottis;increase  squeeze/positive pressure generation;increase tongue base retraction;increase PES opening  -AC (r) CW (t) AC (c)  increase timing;increase closure at entrance to airway/closure of airway at glottis;increase squeeze/positive pressure generation;increase tongue base retraction;increase PES opening  -CH    Increase Timing  prepping - 3 second prep or suck swallow or 3-step swallow  (Pended)   -CW  prepping - 3 second prep or suck swallow or 3-step swallow  -AC (r) CW (t) AC (c)  prepping - 3 second prep or suck swallow or 3-step swallow  -CH    Increase Closure at Entrance to Airway/Closure of Airway at Glottis  super-supraglottic swallow  (Pended)   -CW  super-supraglottic swallow  -AC (r) CW (t) AC (c)  super-supraglottic swallow  -CH    Increase Squeeze/Positive Pressure Generation  effortful pitch glide (falsetto + pharyngeal squeeze)  (Pended)   -CW  effortful pitch glide (falsetto + pharyngeal squeeze)  -AC (r) CW (t) AC (c)  effortful pitch glide (falsetto + pharyngeal squeeze)  -CH    Increase Tongue Base Retraction  hard effortful swallow;williams  (Pended)   -CW  hard effortful swallow;williams  -AC (r) CW (t) AC (c)  hard effortful swallow;williams  -CH    Increase PES Opening  chin tuck against resistance (CTAR)  (Pended)   -CW  chin tuck against resistance (CTAR)  -AC (r) CW (t) AC (c)  chin tuck against resistance (CTAR)  -CH    [place]/Accuracy (Pharyngeal Strengthening Goal 1, SLP)  with minimal cues (75-90% accuracy)  (Pended)   -CW  with minimal cues (75-90% accuracy)  -AC (r) CW (t) AC (c)  with minimal cues (75-90% accuracy)  -CH    Time Frame (Pharyngeal Strengthening Goal 1, SLP)  short term goal (STG);by discharge  (Pended)   -CW  short term goal (STG);by discharge  -AC (r) CW (t) AC (c)  short term goal (STG);by discharge  -CH    Barriers (Pharyngeal Strengthening Goal 1, SLP)  --  Completed effortful swallow and effortful pitch glide x5, 15 sec CTAR hold x1, and CTAR repetative x 10.   -AC (r)  CW (t) AC (c)  Patient completed exercises with mod cues and ST models x 3 each  -CH    Progress/Outcomes (Pharyngeal Strengthening Goal 1, SLP)  goal ongoing  (Pended)   -CW  continuing progress toward goal  -AC (r) CW (t) AC (c)  continuing progress toward goal  -CH       Swallow Management Recall Goal 1 (SLP)    Activity (Swallow Management Recall Goal 1, SLP)  recall of;safe diet level/texture;safe liquid viscosity;compensatory swallow strategies/techniques;postural techniques;rationale for use of strategies/techniques  (Pended)   -CW  recall of;safe diet level/texture;safe liquid viscosity;compensatory swallow strategies/techniques;postural techniques;rationale for use of strategies/techniques  -AC (r) CW (t) AC (c)  recall of;safe diet level/texture;safe liquid viscosity;compensatory swallow strategies/techniques;postural techniques;rationale for use of strategies/techniques  -CH    [place]/Accuracy (Swallow Management Recall Goal 1, SLP)  with minimal cues (75-90% accuracy)  (Pended)   -CW  with minimal cues (75-90% accuracy)  -AC (r) CW (t) AC (c)  with minimal cues (75-90% accuracy)  -CH    Time Frame (Swallow Management Recall Goal 1, SLP)  short term goal (STG);by discharge  (Pended)   -CW  short term goal (STG);by discharge  -AC (r) CW (t) AC (c)  short term goal (STG);by discharge  -CH    Barriers (Swallow Management Recall Goal 1, SLP)  --  Required min cues to recall chin tuck posture  -AC (r) CW (t) AC (c)  Patient required min cues for recall of diet consistency/liquid consistency  -CH    Progress/Outcomes (Swallow Management Recall Goal 1, SLP)  goal ongoing  (Pended)   -CW  continuing progress toward goal  -AC (r) CW (t) AC (c)  continuing progress toward goal  -CH       Swallow Compensatory Strategies Goal 1 (SLP)    Activity (Swallow Compensatory Strategies/Techniques Goal 1, SLP)  compensatory strategies;during meal intake;during p.o. trials;small bites;small cup sips  (Pended)   -CW   compensatory strategies;postural techniques;during meal intake;small bites;small cup sips;chin tuck posture;other (see comments)  -AC (r) CW (t) AC (c)  compensatory strategies;postural techniques;during meal intake;small bites;small cup sips;chin tuck posture;other (see comments)  -CH    [place]/Accuracy (Swallow Compensatory Strategies/Techniques Goal 1, SLP)  independently (over 90% accuracy)  (Pended)   -CW  independently (over 90% accuracy)  -AC (r) CW (t) AC (c)  independently (over 90% accuracy)  -CH    Time Frame (Swallow Compensatory Strategies/Techniques Goal 1, SLP)  short term goal (STG);by discharge  (Pended)   -CW  short term goal (STG);by discharge  -AC (r) CW (t) AC (c)  short term goal (STG);by discharge  -CH    Barriers (Swallow Compensatory Strategies/Techniques Goal 1, SLP)  --  Required min cues to complete chin tuck w/ PO trials   -AC (r) CW (t) AC (c)  Patient required min cues for recall of need for chin tuck and sm single bites and sips.  -CH    Progress/Outcomes (Swallow Compensatory Strategies/Techniques Goal 1, SLP)  --  continuing progress toward goal  -AC (r) CW (t) AC (c)  continuing progress toward goal  -CH       Phonation Goal 1 (SLP)    Improve Phonation By Goal 1 (SLP)  --  using loud speech;80%;with minimal cues (75-90%)  -AC (r) CW (t) AC (c)  using loud speech;80%;with minimal cues (75-90%)  -CH    Time Frame (Phonation Goal 1, SLP)  --  short term goal (STG);by discharge  -AC (r) CW (t) AC (c)  short term goal (STG);by discharge  -CH    Progress (Phonation Goal 1, SLP)  --  50%;with minimal cues (75-90%)  -AC (r) CW (t) AC (c)  50%;with moderate cues (50-74%)  -CH    Progress/Outcomes (Phonation Goal 1, SLP)  --  continuing progress toward goal  -AC (r) CW (t) AC (c)  continuing progress toward goal  -CH       Reduce Perception of Hypernasality Goal 1 (SLP)    Reduce Perception of Hypernasality By Goal 1 (SLP)  --  opening mouth wider for speech;80%;with minimal cues  (75-90%)  -AC (r) CW (t) AC (c)  opening mouth wider for speech;80%;with minimal cues (75-90%)  -CH    Time Frame (Perception of Hypernasality Goal 1, SLP)  --  short term goal (STG);by discharge  -AC (r) CW (t) AC (c)  short term goal (STG);by discharge  -CH    Progress (Perception of Hypernasality Goal 1, SLP)  --  50%;with minimal cues (75-90%)  -AC (r) CW (t) AC (c)  50%;with moderate cues (50-74%)  -CH    Progress/Outcomes (Perception of Hypernasality Goal 1, SLP)  --  continuing progress toward goal  -AC (r) CW (t) AC (c)  continuing progress toward goal  -CH       Articulation Goal 1 (SLP)    Improve Articulation Goal 1 (SLP)  --  of specific sounds in connected speech;by over-articulating in connected speech;80%;with minimal cues (75-90%)  -AC (r) CW (t) AC (c)  of specific sounds in connected speech;by over-articulating in connected speech;80%;with minimal cues (75-90%)  -CH    Time Frame (Articulation Goal 1, SLP)  --  short term goal (STG);by discharge  -AC (r) CW (t) AC (c)  short term goal (STG);by discharge  -CH    Progress (Articulation Goal 1, SLP)  --  50%;with minimal cues (75-90%)  -AC (r) CW (t) AC (c)  --    Progress/Outcomes (Articulation Goal 1, SLP)  --  continuing progress toward goal  -AC (r) CW (t) AC (c)  goal ongoing  -CH       Memory Skills Goal 1 (SLP)    Improve Memory Skills Through Goal 1 (SLP)  --  recalling unrelated word lists with an imposed delay;80%;with minimal cues (75-90%)  -AC (r) CW (t) AC (c)  recalling unrelated word lists with an imposed delay;80%;with minimal cues (75-90%)  -CH    Time Frame (Memory Skills Goal 1, SLP)  --  short term goal (STG);by discharge  -AC (r) CW (t) AC (c)  short term goal (STG);by discharge  -CH    Progress (Memory Skills Goal 1, SLP)  --  70%;with minimal cues (75-90%)  -AC (r) CW (t) AC (c)  --    Progress/Outcomes (Memory Skills Goal 1, SLP)  --  continuing progress toward goal  -AC (r) CW (t) AC (c)  goal ongoing  -CH       Executive  Functional Skills Goal 1 (SLP)    Improve Executive Function Skills Goal 1 (SLP)  --  demonstrate awareness of deficit;identify strategies, strengths, limitations;identify anticipated needs;80%;with minimal cues (75-90%)  -AC (r) CW (t) AC (c)  demonstrate awareness of deficit;identify strategies, strengths, limitations;identify anticipated needs;80%;with minimal cues (75-90%)  -CH    Time Frame (Executive Function Skills Goal 1, SLP)  --  short term goal (STG);by discharge  -AC (r) CW (t) AC (c)  short term goal (STG);by discharge  -CH    Progress (Executive Function Skills Goal 1, SLP)  --  --  70%;with moderate cues (50-74%)  -CH    Progress/Outcomes (Executive Function Skills Goal 1, SLP)  --  goal ongoing  -AC (r) CW (t) AC (c)  continuing progress toward goal  -CH       Additional Goal 1 (SLP)    Additional Goal 1, SLP  --  LTG: Pt will improve cognitive-communication skills to actively participate in care w/ 100% accuracy w/o cues.   -AC (r) CW (t) AC (c)  LTG: Pt will improve cognitive-communication skills to actively participate in care w/ 100% accuracy w/o cues.   -CH    Time Frame (Additional Goal 1, SLP)  --  by discharge  -AC (r) CW (t) AC (c)  by discharge  -CH    Progress/Outcomes (Additional Goal 1, SLP)  --  continuing progress toward goal  -AC (r) CW (t) AC (c)  continuing progress toward goal  -CH    Row Name 12/10/19 1420             Oral Nutrition/Hydration Goal 1 (SLP)    Oral Nutrition/Hydration Goal 1, SLP  LTG: Pt will return to regular diet and thin liquids w/ 100% accuracy w/o cues  -RD      Time Frame (Oral Nutrition/Hydration Goal 1, SLP)  by discharge  -RD         Oral Nutrition/Hydration Goal 2 (SLP)    Oral Nutrition/Hydration Goal 2, SLP  Pt will tolerate level 3-pureed w/ some mashed and honey-thick liquids w/ compensations w/ no overt s/s of aspiration w/ 100% accuracy w/o cues  -RD      Time Frame (Oral Nutrition/Hydration Goal 2, SLP)  short term goal (STG);by discharge  -RD          Lingual Strengthening Goal 1 (SLP)    Activity (Lingual Strengthening Goal 1, SLP)  increase lingual tone/sensation/control/coordination/movement;increase tongue back strength  -RD      Increase Lingual Tone/Sensation/Control/Coordination/Movement  lingual movement exercises  -RD      Increase Tongue Back Strength  lingual resistance exercises  -RD      [place]/Accuracy (Lingual Strengthening Goal 1, SLP)  with minimal cues (75-90% accuracy)  -RD      Time Frame (Lingual Strengthening Goal 1, SLP)  short term goal (STG);by discharge  -RD         Pharyngeal Strengthening Exercise Goal 1 (SLP)    Activity (Pharyngeal Strengthening Goal 1, SLP)  increase timing;increase closure at entrance to airway/closure of airway at glottis;increase squeeze/positive pressure generation;increase tongue base retraction;increase PES opening  -RD      Increase Timing  prepping - 3 second prep or suck swallow or 3-step swallow  -RD      Increase Closure at Entrance to Airway/Closure of Airway at Glottis  super-supraglottic swallow  -RD      Increase Squeeze/Positive Pressure Generation  effortful pitch glide (falsetto + pharyngeal squeeze)  -RD      Increase Tongue Base Retraction  hard effortful swallow;williams  -RD      Increase PES Opening  chin tuck against resistance (CTAR)  -RD      [place]/Accuracy (Pharyngeal Strengthening Goal 1, SLP)  with minimal cues (75-90% accuracy)  -RD      Time Frame (Pharyngeal Strengthening Goal 1, SLP)  short term goal (STG);by discharge  -RD         Swallow Management Recall Goal 1 (SLP)    Activity (Swallow Management Recall Goal 1, SLP)  recall of;safe diet level/texture;safe liquid viscosity;compensatory swallow strategies/techniques;postural techniques;rationale for use of strategies/techniques  -RD      [place]/Accuracy (Swallow Management Recall Goal 1, SLP)  with minimal cues (75-90% accuracy)  -RD      Time Frame (Swallow Management Recall Goal 1, SLP)  short term goal  (STG);by discharge  -RD         Swallow Compensatory Strategies Goal 1 (SLP)    Activity (Swallow Compensatory Strategies/Techniques Goal 1, SLP)  compensatory strategies;postural techniques;during meal intake;small bites;small cup sips;chin tuck posture;other (see comments) L lingual sweep w/ solids  -RD      [place]/Accuracy (Swallow Compensatory Strategies/Techniques Goal 1, SLP)  independently (over 90% accuracy)  -RD      Time Frame (Swallow Compensatory Strategies/Techniques Goal 1, SLP)  short term goal (STG);by discharge  -RD         Phonation Goal 1 (SLP)    Improve Phonation By Goal 1 (SLP)  using loud speech;80%;with minimal cues (75-90%)  -RD      Time Frame (Phonation Goal 1, SLP)  short term goal (STG);by discharge  -RD         Reduce Perception of Hypernasality Goal 1 (SLP)    Reduce Perception of Hypernasality By Goal 1 (SLP)  opening mouth wider for speech;80%;with minimal cues (75-90%)  -RD      Time Frame (Perception of Hypernasality Goal 1, SLP)  short term goal (STG);by discharge  -RD         Articulation Goal 1 (SLP)    Improve Articulation Goal 1 (SLP)  of specific sounds in connected speech;by over-articulating in connected speech;80%;with minimal cues (75-90%)  -RD      Time Frame (Articulation Goal 1, SLP)  short term goal (STG);by discharge  -RD         Memory Skills Goal 1 (SLP)    Improve Memory Skills Through Goal 1 (SLP)  recalling unrelated word lists with an imposed delay;80%;with minimal cues (75-90%)  -RD      Time Frame (Memory Skills Goal 1, SLP)  short term goal (STG);by discharge  -RD         Executive Functional Skills Goal 1 (SLP)    Improve Executive Function Skills Goal 1 (SLP)  demonstrate awareness of deficit;identify strategies, strengths, limitations;identify anticipated needs;80%;with minimal cues (75-90%)  -RD      Time Frame (Executive Function Skills Goal 1, SLP)  short term goal (STG);by discharge  -RD         Additional Goal 1 (SLP)    Additional Goal 1, SLP   LTG: Pt will improve cognitive-communication skills to actively participate in care w/ 100% accuracy w/o cues.   -RD      Time Frame (Additional Goal 1, SLP)  by discharge  -RD        User Key  (r) = Recorded By, (t) = Taken By, (c) = Cosigned By    Initials Name Provider Type    Rebeka Shoemaker, MS CCC-SLP Speech and Language Pathologist    Winnie Kaur, MS CCC-SLP Speech and Language Pathologist    Catalina Villavicencio, MS CCC-SLP Speech and Language Pathologist    CW Deanne Baker, Speech Therapy Student Speech Therapy Student             Time Calculation:   Time Calculation- SLP     Row Name 12/13/19 1418 12/13/19 1007          Time Calculation- SLP    SLP Start Time  1030  (Pended)   -  0920  -AC (r) CW (t) AC (c)     SLP Received On  12/13/19  (Pended)   -CW  12/13/19  -AC (r) CW (t) AC (c)       User Key  (r) = Recorded By, (t) = Taken By, (c) = Cosigned By    Initials Name Provider Type    Rebeka Shoemaker, MS CCC-SLP Speech and Language Pathologist     Deanne Baker, Speech Therapy Student Speech Therapy Student          Therapy Charges for Today     Code Description Service Date Service Provider Modifiers Qty    34805579020 HC ST TREATMENT SWALLOW 2 12/13/2019 Deanne Baker, Speech Therapy Student GN 1    [id number] HC ST TREATMENT SPEECH 2 12/13/2019 Deanne Baker, Speech Therapy Student GN 1    [id number] HC ST FIBEROPTIC ENDO EVAL SWALL 6 12/13/2019 Deanne Baker, Speech Therapy Student GN 1               Deanne Baker, Speech Therapy Student  12/13/2019

## 2019-12-13 NOTE — PLAN OF CARE
Problem: Patient Care Overview  Goal: Plan of Care Review  Outcome: Outcome(s) achieved  Flowsheets (Taken 12/13/2019 4851)  Progress: no change  Plan of Care Reviewed With: patient; son  Outcome Summary: VSS. AOx4. NIH 1 for facial droop on left side. No reports of pain. Afternoon bladder scan showed greater than 500 mLs; 400 mLs returned on straight cath. Up to chair for the afternoon. Potassium replaced per protocol. Diet advanced and tolerating.

## 2019-12-14 VITALS
SYSTOLIC BLOOD PRESSURE: 166 MMHG | RESPIRATION RATE: 18 BRPM | TEMPERATURE: 97.3 F | HEIGHT: 71 IN | WEIGHT: 160 LBS | OXYGEN SATURATION: 96 % | BODY MASS INDEX: 22.4 KG/M2 | DIASTOLIC BLOOD PRESSURE: 96 MMHG | HEART RATE: 79 BPM

## 2019-12-14 PROBLEM — N17.9 AKI (ACUTE KIDNEY INJURY) (HCC): Status: RESOLVED | Noted: 2019-12-09 | Resolved: 2019-12-14

## 2019-12-14 LAB
ANION GAP SERPL CALCULATED.3IONS-SCNC: 11 MMOL/L (ref 5–15)
BUN BLD-MCNC: 33 MG/DL (ref 8–23)
BUN/CREAT SERPL: 33.3 (ref 7–25)
CALCIUM SPEC-SCNC: 8.7 MG/DL (ref 8.2–9.6)
CHLORIDE SERPL-SCNC: 105 MMOL/L (ref 98–107)
CO2 SERPL-SCNC: 24 MMOL/L (ref 22–29)
CREAT BLD-MCNC: 0.99 MG/DL (ref 0.76–1.27)
GFR SERPL CREATININE-BSD FRML MDRD: 71 ML/MIN/1.73
GLUCOSE BLD-MCNC: 90 MG/DL (ref 65–99)
POTASSIUM BLD-SCNC: 3.8 MMOL/L (ref 3.5–5.2)
POTASSIUM BLD-SCNC: 4.2 MMOL/L (ref 3.5–5.2)
SODIUM BLD-SCNC: 140 MMOL/L (ref 136–145)

## 2019-12-14 PROCEDURE — 80048 BASIC METABOLIC PNL TOTAL CA: CPT | Performed by: INTERNAL MEDICINE

## 2019-12-14 PROCEDURE — 99239 HOSP IP/OBS DSCHRG MGMT >30: CPT | Performed by: NURSE PRACTITIONER

## 2019-12-14 PROCEDURE — 84132 ASSAY OF SERUM POTASSIUM: CPT | Performed by: INTERNAL MEDICINE

## 2019-12-14 RX ORDER — TAMSULOSIN HYDROCHLORIDE 0.4 MG/1
0.4 CAPSULE ORAL DAILY
Qty: 30 CAPSULE
Start: 2019-12-14

## 2019-12-14 RX ORDER — HYDROCHLOROTHIAZIDE 25 MG/1
25 TABLET ORAL DAILY
Status: DISCONTINUED | OUTPATIENT
Start: 2019-12-14 | End: 2019-12-14 | Stop reason: HOSPADM

## 2019-12-14 RX ORDER — SACCHAROMYCES BOULARDII 250 MG
250 CAPSULE ORAL 2 TIMES DAILY
Start: 2019-12-14 | End: 2019-12-21

## 2019-12-14 RX ORDER — ATORVASTATIN CALCIUM 80 MG/1
80 TABLET, FILM COATED ORAL NIGHTLY
Start: 2019-12-14

## 2019-12-14 RX ORDER — CEFUROXIME AXETIL 250 MG/1
250 TABLET ORAL 2 TIMES DAILY
Start: 2019-12-15 | End: 2019-12-19

## 2019-12-14 RX ORDER — LOSARTAN POTASSIUM 25 MG/1
25 TABLET ORAL DAILY
Status: DISCONTINUED | OUTPATIENT
Start: 2019-12-14 | End: 2019-12-14 | Stop reason: HOSPADM

## 2019-12-14 RX ADMIN — LOSARTAN POTASSIUM 25 MG: 25 TABLET, FILM COATED ORAL at 09:06

## 2019-12-14 RX ADMIN — TAMSULOSIN HYDROCHLORIDE 0.4 MG: 0.4 CAPSULE ORAL at 09:06

## 2019-12-14 RX ADMIN — APIXABAN 2.5 MG: 2.5 TABLET, FILM COATED ORAL at 09:06

## 2019-12-14 RX ADMIN — HYDROCHLOROTHIAZIDE 25 MG: 25 TABLET ORAL at 09:06

## 2019-12-14 NOTE — PLAN OF CARE
Problem: Patient Care Overview  Goal: Plan of Care Review  Outcome: Ongoing (interventions implemented as appropriate)  Flowsheets (Taken 12/14/2019 0535)  Progress: no change  Plan of Care Reviewed With: patient  Outcome Summary: Pt rested well overnight. VSS. NIH 1. Continued to bladder scan every 6 hours. At 0000 scan was 361, only 200 dark, ciro urine removed with In & out cath. Provider informed and instructed to scan at 0300. 0300 scan was 332. Potassium WNL after replacement. Planning to be discharged at 1130 12/14/19. WIll continue to monitor.

## 2019-12-14 NOTE — DISCHARGE PLACEMENT REQUEST
"Tho Elam (89 y.o. Male)     From [place],   [phone]      Date of Birth Social Security Number Address Home Phone MRN    10/08/1927  [facility] [address]  [place] [id number] [phone] [id number]    Sabianist Marital Status          Non-Scientologist Single       Admission Date Admission Type Admitting Provider Attending Provider Department, Room/Bed    12/9/19 Emergency Candi Hong MD Brown, Hannah, MD [facility] 3E, S338/1    Discharge Date Discharge Disposition Discharge Destination         Rehab Facility or Unit (DC - External)              Attending Provider:  Candi Hong MD    Allergies:  No Known Allergies    Isolation:  None   Infection:  None   Code Status:  No CPR    Ht:  180.3 cm (71\")   Wt:  72.6 kg (160 lb)    Admission Cmt:  None   Principal Problem:  Stroke (CMS/[facility]) [I63.9]                 Active Insurance as of 12/9/2019     Primary Coverage     Payor Plan Insurance Group Employer/Plan Group    MEDICARE MEDICARE A & B      Payor Plan Address Payor Plan Phone Number Payor Plan Fax Number Effective Dates    [address] [phone]  10/1/1992 - None Entered    [facility] [id number]       Subscriber Name Subscriber Birth Date Member ID       THO ELAM 10/8/1927 [id number]           Secondary Coverage     Payor Plan Insurance Group Employer/Plan Group    [employer] MC SUP AAR HEALTH CARE OPTIONS      Payor Plan Address Payor Plan Phone Number Payor Plan Fax Number Effective Dates    [facility] [phone]  1/1/2019 - None Entered    [address]       [facility] [id number]       Subscriber Name Subscriber Birth Date Member ID       THO ELAM 10/8/1927 [id number]                 Emergency Contacts      (Rel.) Home Phone Work Phone Mobile Phone    GeoAveli (Daughter) [phone] -- --    KarlJuventino (Son) [phone] --                 Discharge Summary      Shilo Acosta APRN at " 19 0903     Attestation signed by Candi Hong MD at 19 0956    I supervised the care of the patient on the day of discharge.  I saw the patient and discussed the plan with him prior to discharge.                      [facility] Medicine Services  TRANSFER SUMMARY    Patient Name: Tho Kohli  : 10/8/1927  MRN: [id number]    Date of Admission: 2019  Date of Discharge: 19  Length of Stay: 5  Primary Care Physician: Daniel Adams MD    Consults     Date and Time Order Name Status Description    12/10/2019 1219 Inpatient Cardiology Consult      2019 1438 Inpatient Neurology Consult Stroke      2019 1120 Inpatient Neurology Consult Stroke            Hospital Course     Presenting Problem:   Dysarthria [R47.1]    Active Hospital Problems    Diagnosis  POA   • **Stroke (CMS/[facility]) [I63.9]  Yes   • Grade III diastolic dysfunction [I51.9]  Yes   • Elevated troponin [R79.89]  Yes   • Abnormal EKG [R94.31]  Yes   • Essential hypertension [I10]  Yes   • Dysarthria [R47.1]  Yes      Resolved Hospital Problems    Diagnosis Date Resolved POA   • SALVADOR (acute kidney injury) (CMS/[facility]) [N17.9] 2019 Yes          Hospital Course:  Tho Kohli is a 89 y.o. male with PMH significant for HTN, SALVADOR, Grade III diastolic dysfunction, SDH 2007, overactive bladder who was admitted to [facility] on 19 with acute bilateral cerebral infarcts from his assisted living facility after being found down.     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  -Neurology and cardiology follo w ups to be scheduled after rehab completion  -Conitnue Eliquis 2.5mg BID per cardiology recs  -Will remain off ASA while on Eliquis due to risk of bleeding/age  -Continue atorvastatin  -BLADIMIR on 2019 without thrombus  -Cardiac monitor to be arranged by cardiology after he completes rehabilitation      Right lower extremity swelling  -Duplex ultrasound bilaterally negative for DVT      Dysarthria and dysphagia  -dysarthria, significant improvement  -dysphagia diet, nectar thick liquids per SLP recs      Acute urinary retention  Hematuria  UTI  -Continue Flomax  -In and out cath per protocol as needed  -Transition to Ceftin PO x4 days to start tomorrow 12/15/19  -If urinary retention and hematuria persist, he will need outpatient urology evaluation.      HTN  -Resume home medications, on HCTZ and losartan at home.    Discharge Follow Up Recommendations for labs/diagnostics:  Follow up with PCP 1 week after discharge from inpatient rehab for transition of care follow up.   Follow up with Cardiologist Dr. Calle after inpatient rehab to get 30 day event monitor.  Follow up with [facility] Neurology in 1 month.    Day of Discharge     HPI:   Patient is resting in bed comfortably this morning. No family currently at bedside. He denies any problems overnight and states that he slept well. He reports that his speech has drastically improved since admission, but he still has some left leg weakness. He is understanding and agreeable with the plan to transfer to [facility] inpatient rehab this afternoon. He reports that he continues to have urinary retention. He denies any new or worsening symptoms.     Review of Systems   Constitutional: Positive for fatigue. Negative for chills and fever.   HENT: Positive for trouble swallowing. Negative for voice change.    Eyes: Negative for photophobia and visual disturbance.   Respiratory: Negative for chest tightness, shortness of breath and wheezing.    Cardiovascular: Negative for chest pain and leg swelling.   Gastrointestinal: Negative for abdominal pain, constipation, diarrhea and vomiting.   Genitourinary: Positive for difficulty urinating and urgency.   Musculoskeletal: Positive for gait problem. Negative for back pain.   Skin: Negative for color change and pallor.   Allergic/Immunologic: Negative.    Neurological: Negative for dizziness,  facial asymmetry, speech difficulty, light-headedness and headaches.   Hematological: Negative.    Psychiatric/Behavioral: Negative for confusion and suicidal ideas. The patient is not nervous/anxious.        Otherwise complete ROS is negative except as mentioned in the HPI.    Vital Signs:   Temp:  [97.3 °F (36.3 °C)-99.4 °F (37.4 °C)] 97.3 °F (36.3 °C)  Heart Rate:  [] 83  Resp:  [16-18] 18  BP: (105-166)/(73-96) 166/96     Physical Exam:  Constitutional: Awake, alert  Eyes: PERRL  HENT: NCAT, mucous membranes moist  Neck: Supple, no thyromegaly, no lymphadenopathy, trachea midline  Respiratory: Clear to auscultation bilaterally, nonlabored respirations   Cardiovascular: RRR, no murmurs, rubs, or gallops, palpable pedal pulses bilaterally  Gastrointestinal: Positive bowel sounds, soft, nontender, nondistended  Musculoskeletal: No bilateral ankle edema, no clubbing or cyanosis to extremities  Psychiatric: Appropriate affect, cooperative  Neurologic: Oriented x 3, RLE > LLE, speech clear  Skin: No rashes      Pertinent Results     Results from last 7 days   Lab Units 12/14/19  0432 12/14/19  0032 12/13/19  0600 12/10/19  0513 12/09/19  1136 12/09/19  1131   WBC 10*3/mm3  --   --  13.28*  --  8.83  --    HEMOGLOBIN g/dL  --   --  12.6*  --  12.9*  --    HEMOGLOBIN, POC g/dL  --   --   --   --   --  13.3   HEMATOCRIT %  --   --  40.8  --  39.6  --    HEMATOCRIT POC %  --   --   --   --   --  39   PLATELETS 10*3/mm3  --   --  146  --  197  --    SODIUM mmol/L 140  --  140 139  --   --    POTASSIUM mmol/L 3.8 4.2 3.2* 3.6  --   --    CHLORIDE mmol/L 105  --  103 102  --   --    CO2 mmol/L 24.0  --  24.0 25.0  --   --    BUN mg/dL 33*  --  31* 23  --   --    CREATININE mg/dL 0.99  --  1.00 1.19  --  1.60*   GLUCOSE mg/dL 90  --  110* 86  --   --    CALCIUM mg/dL 8.7  --  8.1* 9.0  --   --      Results from last 7 days   Lab Units 12/09/19  1136 12/09/19  1130   ALT (SGPT) U/L 16  --    AST (SGOT) U/L 21  --     PROTIME seconds  --  12.4*   INR   --  1.0   APTT seconds 29.7  --      Results from last 7 days   Lab Units 12/10/19  0513   CHOLESTEROL mg/dL 133   TRIGLYCERIDES mg/dL 46   HDL CHOL mg/dL 57     Results from last 7 days   Lab Units 12/10/19  0513   HEMOGLOBIN A1C % 5.30     Brief Urine Lab Results  (Last result in the past 365 days)      Color   Clarity   Blood   Leuk Est   Nitrite   Protein   CREAT   Urine HCG        12/11/19 0449 Yellow Clear Large (3+) Trace Negative >=300 mg/dL (3+)               Microbiology Results Abnormal     Procedure Component Value - Date/Time    Urine Culture - Urine, Urine, Clean Catch [[id number]]  (Abnormal)  (Susceptibility) Collected:  12/11/19 0449    Lab Status:  Final result Specimen:  Urine, Clean Catch Updated:  12/13/19 0418     Urine Culture >100,000 CFU/mL Escherichia coli    Susceptibility      Escherichia coli     AMY     Ampicillin Susceptible     Ampicillin + Sulbactam Susceptible     Cefazolin Susceptible     Cefepime Susceptible     Ceftazidime Susceptible     Ceftriaxone Susceptible     Gentamicin Susceptible     Levofloxacin Susceptible     Nitrofurantoin Susceptible     Piperacillin + Tazobactam Susceptible     Tetracycline Susceptible     Trimethoprim + Sulfamethoxazole Susceptible                          Imaging Results (All)     Procedure Component Value Units Date/Time    Fiberoptic Endo (fees) [[id number]] Resulted:  12/13/19 1101     Updated:  12/13/19 1101    Narrative:       This procedure was auto-finalized with no dictation required.    FL Video Swallow With Speech [[id number]] Collected:  12/10/19 1453     Updated:  12/10/19 1626    Narrative:       EXAMINATION: FL VIDEO SWALLOW W SPEECH-     INDICATION: dysphagia; R47.1-Dysarthria and anarthria; R53.1-Weakness;  Z74.09-Other reduced mobility     TECHNIQUE: 1 minute and 42 seconds of fluoroscopic time was used for  this exam. 1 associated image was saved. The patient was evaluated in  the seated lateral  position while taking a variety of consistencies of  barium by mouth under the direction of speech pathology.     COMPARISON: NONE     FINDINGS: There was aspiration with sips of thin barium. There was no  penetration and no aspiration with nectar, or any of the barium with a  modified chin tuck. There was no penetration and no aspiration with  pudding, or solid consistency barium.          Impression:       Fluoroscopy provided for a modified barium swallow. Please  see speech therapy report for full details and recommendations.         This report was finalized on 12/10/2019 4:23 PM by Dr. Fernando Contreras.       MRI Brain Without Contrast [[id number]] Collected:  12/10/19 0829     Updated:  12/10/19 1125    Narrative:       EXAMINATION: MRI BRAIN WO CONTRAST- 12/10/2019     INDICATION: Stroke; R47.1-Dysarthria and anarthria; R53.1-Weakness     TECHNIQUE: Sagittal and axial images of brain are displayed. No  intravenous contrast was utilized.     COMPARISON: NONE     FINDINGS: In addition to extensive age-related change, there are small  punctate areas of abnormal signal lateral to each lateral ventricle as  well as in the right thalamic region and right lentiform nucleus. There  are associated areas of restricted diffusion consistent with small  bilateral embolic infarcts.       Impression:       There are small bilateral acute infarcts presumably embolic  in nature. Otherwise there are age-related findings.     D:  12/10/2019  E:  12/10/2019         This report was finalized on 12/10/2019 11:22 AM by Dr. Humberto Ramos MD.       XR Chest 1 View [[id number]] Collected:  12/09/19 1205     Updated:  12/10/19 0834    Narrative:       EXAMINATION: XR CHEST 1 VW-12/09/2019:      INDICATION: Acute Stroke Protocol (onset < 12 hrs).      COMPARISON: Chest radiograph 09/30/2007.     FINDINGS: Single portable chest radiograph was submitted for review. The  heart is not particularly enlarged. There is what appears to be a  very  large 16 cm transverse measured hiatal hernia with an air-fluid level.  Chronic changes in the lungs are identified without convincing evidence  for active airspace disease. No pleural effusion or pneumothorax. The  visualized upper abdomen is unrevealing. Diffuse osteopenia suggested.            Impression:       1.  What appears to be a large hiatal hernia measuring up to 16 cm in  transverse diameter with an air-fluid level. Consider follow-up CT  imaging of the chest for further evaluation if this is not clinically  known.  2.  Chronic changes in the lungs identified without evidence of active  airspace disease.     D:  12/09/2019  E:  12/09/2019     This report was finalized on 12/10/2019 8:31 AM by Dr. Nilson Robins MD.       CT Cerebral Perfusion With & Without Contrast [[id number]] Collected:  12/09/19 1152     Updated:  12/10/19 0833    Narrative:       EXAMINATION: CT CEREBRAL PERFUSION W WO CONTRAST- 12/09/2019     INDICATION: TIA, initial screening     TECHNIQUE: Cerebral perfusion analysis was performed using computed  tomography with contrast administration, including post processing of  parametric maps with determination of cerebral blood flow, cerebral  blood volume, and mean transit time.     The radiation dose reduction device was turned on for each scan per the  ALARA (As Low as Reasonably Achievable) protocol.     COMPARISON: CT of the head 12/09/2019     FINDINGS: Dedicated perfusion imaging of the brain demonstrates no focal  increased mean transit time abnormality within a vascular distribution  to suggest large territorial reversible infarcts. Rapid analysis  demonstrates focal increased mismatch volume within the posterior  circulation of the occipital lobes bilaterally with an additional  increased TMAX abnormality within the right parietal lobe measuring 11  mm greater than 6 seconds. There is some misregistration artifact along  the left cerebral convexity at the site of prior  surgery/dural  thickening.       Impression:       No convincing perfusion abnormality within a vascular  territory to suggest large ischemic defect. There is focal increased  TMAX abnormality within the right parietal and multifocal regions within  the occipital lobe which could be artifactual/slow flow related however  small vessel infarcts are not excluded. Consider follow-up MRI imaging  of brain which would provide a more detailed evaluation.     D:  12/09/2019  E:  12/09/2019     This report was finalized on 12/10/2019 8:30 AM by Dr. Nilson Robins MD.       CT Angiogram Head [[id number]] Collected:  12/09/19 1202     Updated:  12/10/19 0833    Narrative:       EXAMINATION: CT ANGIOGRAM HEAD-, CT ANGIOGRAM NECK- 12/09/2019     INDICATION: Stroke     TECHNIQUE: Unenhanced CT imaging of the head and neck was performed  followed by dedicated CTA imaging of the head and neck vasculature with  additional multiplanar 3-D MIP and VRT reconstructed imaging.     Stenosis measurement was performed by the NASCET or similar method.     The radiation dose reduction device was turned on for each scan per the  ALARA (As Low as Reasonably Achievable) protocol.     COMPARISON: CT of the head 12/09/2019     FINDINGS:      UNENHANCED: The visualized lung apex demonstrates an accessory azygos  fissure. Fibronodular scarring is noted. Identified with the left lung  apex is a 1.5 x 0.8 cm nodule which may relate to scarring, although  follow-up as clinically indicated. No hyperdensity identified within the  carotid arteries to suggest intramural hematoma.     CTA IMAGING: Dedicated CTA imaging demonstrates no convincing evidence  of filling defect within the pulmonary arterial vasculature as  visualized. The ascending aorta does not demonstrate acute abnormality.  The ascending aorta measures up to 3.7 cm in maximum dimension. The  bilateral subclavian arteries appear patent. The bilateral common  carotid arteries are tortuous  and demonstrates scattered atherosclerotic  calcifications without significant narrowing identified. The carotid  bifurcations are patent with only minimal calcified atherosclerotic  disease. The external carotid systems appear patent bilaterally. The  right common carotid artery is patent without evidence of occlusion or  significant stenosis. The cavernous and clinoid and supraclinoid  portions appear intact. The left common carotid artery appears patent  without significant stenosis. There is focal dilation measuring up to  1.1 cm involving the proximal cervical portion of the left internal  carotid artery. The remainder of the left internal carotid artery  appears patent without significant occlusion or stenosis identified. The  clinoid and supraclinoid portions appear patent. The right middle  cerebral artery appears patent without significant narrowing or  occlusion identified. The left middle cerebral artery appears patent  without convincing evidence for significant occlusion or narrowing. The  anterior cerebral arteries appear patent without significant stenosis or  aneurysm identified. Persistent right fetal PCA anatomy is identified.  No significant stenosis of the right posterior cerebral artery  identified. The left posterior cerebral artery appears patent. The  basilar artery appears patent. Left dominant vertebral artery system is  identified although the vertebral arteries appear patent throughout  their course in the intervertebral foramen and into the upper chest.       Impression:       1. CTA evaluation of the head and neck demonstrates no convincing  evidence for large territorial vessel occlusion or stenosis.     2. There is focal prominence of the cervical left internal carotid  artery measuring up to 1.1 cm.     3. Incidentally identified 1.5 cm left upper lobe pulmonary nodule which  could relate to scarring although follow-up is clinically indicated when  the patient is able.     D:   12/09/2019  E:  12/09/2019     This report was finalized on 12/10/2019 8:30 AM by Dr. Nilson Robins MD.       CT Angiogram Neck [[id number]] Collected:  12/09/19 1202     Updated:  12/10/19 0833    Narrative:       EXAMINATION: CT ANGIOGRAM HEAD-, CT ANGIOGRAM NECK- 12/09/2019     INDICATION: Stroke     TECHNIQUE: Unenhanced CT imaging of the head and neck was performed  followed by dedicated CTA imaging of the head and neck vasculature with  additional multiplanar 3-D MIP and VRT reconstructed imaging.     Stenosis measurement was performed by the NASCET or similar method.     The radiation dose reduction device was turned on for each scan per the  ALARA (As Low as Reasonably Achievable) protocol.     COMPARISON: CT of the head 12/09/2019     FINDINGS:      UNENHANCED: The visualized lung apex demonstrates an accessory azygos  fissure. Fibronodular scarring is noted. Identified with the left lung  apex is a 1.5 x 0.8 cm nodule which may relate to scarring, although  follow-up as clinically indicated. No hyperdensity identified within the  carotid arteries to suggest intramural hematoma.     CTA IMAGING: Dedicated CTA imaging demonstrates no convincing evidence  of filling defect within the pulmonary arterial vasculature as  visualized. The ascending aorta does not demonstrate acute abnormality.  The ascending aorta measures up to 3.7 cm in maximum dimension. The  bilateral subclavian arteries appear patent. The bilateral common  carotid arteries are tortuous and demonstrates scattered atherosclerotic  calcifications without significant narrowing identified. The carotid  bifurcations are patent with only minimal calcified atherosclerotic  disease. The external carotid systems appear patent bilaterally. The  right common carotid artery is patent without evidence of occlusion or  significant stenosis. The cavernous and clinoid and supraclinoid  portions appear intact. The left common carotid artery appears  patent  without significant stenosis. There is focal dilation measuring up to  1.1 cm involving the proximal cervical portion of the left internal  carotid artery. The remainder of the left internal carotid artery  appears patent without significant occlusion or stenosis identified. The  clinoid and supraclinoid portions appear patent. The right middle  cerebral artery appears patent without significant narrowing or  occlusion identified. The left middle cerebral artery appears patent  without convincing evidence for significant occlusion or narrowing. The  anterior cerebral arteries appear patent without significant stenosis or  aneurysm identified. Persistent right fetal PCA anatomy is identified.  No significant stenosis of the right posterior cerebral artery  identified. The left posterior cerebral artery appears patent. The  basilar artery appears patent. Left dominant vertebral artery system is  identified although the vertebral arteries appear patent throughout  their course in the intervertebral foramen and into the upper chest.       Impression:       1. CTA evaluation of the head and neck demonstrates no convincing  evidence for large territorial vessel occlusion or stenosis.     2. There is focal prominence of the cervical left internal carotid  artery measuring up to 1.1 cm.     3. Incidentally identified 1.5 cm left upper lobe pulmonary nodule which  could relate to scarring although follow-up is clinically indicated when  the patient is able.     D:  12/09/2019  E:  12/09/2019     This report was finalized on 12/10/2019 8:30 AM by Dr. Nilson Robins MD.       CT Head Without Contrast Stroke Protocol [[id number]] Collected:  12/09/19 1137     Updated:  12/10/19 0833    Narrative:       EXAMINATION: CT HEAD WO CONTRAST STROKE PROTOCOL- 12/09/2019     INDICATION: Stroke     TECHNIQUE: Unenhanced CT imaging of the head was performed during a code  stroke protocol     The radiation dose reduction device  was turned on for each scan per the  ALARA (As Low as Reasonably Achievable) protocol.     COMPARISON: CT of the head 12/04/2007     FINDINGS: Unenhanced CT imaging of the brain demonstrates no convincing  evidence for midline shift or mass effect. No evidence of hydrocephalus.  Diffuse cerebral atrophy is identified. There is no convincing evidence  for extra-axial fluid collection. Identified within the left lateral  extra-axial cerebral convexity is a hyperdense linear region adjacent to  a prior craniotomy defect consistent with thickened dura which is  significantly improved from 12/04/2007. There is no convincing evidence  of extra-axial hemorrhage within this region. The gray-white matter  junction demonstrate multifocal regions of low attenuation throughout  the periventricular and subcortical white matter most pronounced  involving the right parietal lobe. This could relate to chronic  microvascular ischemic change however small infarcts cannot be entirely  excluded particularly within the right parietal region. The skull  appears intact with the exception of the prior left craniotomy defect  without evidence for depressed skull fracture. Paranasal sinuses appear  clear. Incidental note of cavum septum pellucidum, an anatomic variant.       Impression:       1.  No convincing evidence for acute intracranial pathology identified.  There is linear hyperattenuation along the left cerebral convexity  adjacent to the prior craniotomy defect favored to represent residual  thickened dura rather than hemorrhage. Consider follow-up imaging on MRI  which may provide additional evaluation.     2. Multifocal confluent hypoattenuation within the white matter as  described above most pronounced involving the right parietal lobe. Small  infarct/ischemia would be difficult to entirely exclude. Attention on  follow-up more advanced imaging advised.     These findings were discussed with Dr. Velazquez at exam time 11:16 AM  on  12/09/2019.     D:  12/09/2019  E:  12/09/2019        This report was finalized on 12/10/2019 8:30 AM by Dr. Nilson Robins MD.             Results for orders placed during the hospital encounter of 12/09/19   Adult Transesophageal Echo (BLADIMIR) W/ Cont if Necessary Per Protocol    Narrative · Technically difficult and limited study due to presence of hiatal   hernia.  · No evidence of left atrial appendage thrombus  · Bubble study was negative for right to left interatrial shunt. No   evidence PFO  · Trileaflet aortic valve without significant abnormality  · Mild grade 1 plaque in descending aorta and aortic arch  · No cardiac source of embolus identified on this limited study              Discharge Details        Discharge Medications      New Medications      Instructions Start Date   apixaban 2.5 MG tablet tablet  Commonly known as:  ELIQUIS   2.5 mg, Oral, Every 12 Hours Scheduled      atorvastatin 80 MG tablet  Commonly known as:  LIPITOR   80 mg, Oral, Nightly      cefuroxime 250 MG tablet  Commonly known as:  CEFTIN   250 mg, Oral, 2 Times Daily   Start Date:  December 15, 2019     saccharomyces boulardii 250 MG capsule  Commonly known as:  FLORASTOR   250 mg, Oral, 2 Times Daily      tamsulosin 0.4 MG capsule 24 hr capsule  Commonly known as:  FLOMAX   0.4 mg, Oral, Daily         Continue These Medications      Instructions Start Date   hydroCHLOROthiazide 25 MG tablet  Commonly known as:  HYDRODIURIL   25 mg, Oral, Daily      losartan 25 MG tablet  Commonly known as:  COZAAR   25 mg, Oral, Daily             No Known Allergies      Discharge Disposition:  Rehab Facility or Unit (DC - External)    Discharge Diet:  Diet Order   Procedures   • Diet Dysphagia; IV - Mechanical Soft No Mixed Consistencies; Nectar / Syrup Thick; No Straws; Cardiac       Discharge Activity:  Activity Instructions     Activity as Tolerated              CODE STATUS:    Code Status and Medical Interventions:   Ordered at: 12/09/19  1402     Limited Support to NOT Include:    Antiarrhythmic Drugs    Intubation     Level Of Support Discussed With:    Patient     Code Status:    No CPR     Medical Interventions (Level of Support Prior to Arrest):    Limited         No future appointments.    Additional Instructions for the Follow-ups that You Need to Schedule     Discharge Follow-up with Specialty: [facility]; 1 Month   As directed      Specialty:  [facility]    Follow Up:  1 Month    Follow Up Details:  after rehab discharge         Discharge Follow-up with Specified Provider: Dr. Calle; 2 Weeks   As directed      To:  Dr. Calle    Follow Up:  2 Weeks    Follow Up Details:  1-2 weeks after discharge from rehab for 30 day event monitor               Time Spent on Discharge:  36 minutes    Electronically signed by WILLA Julien, 12/14/19, 9:04 AM.    Electronically signed by Fernando Titus MD at 12/14/19 0956       Discharge Order (From admission, onward)     Start     Ordered    12/14/19 0855  Discharge patient  Once     Expected Discharge Date:  12/14/19    Discharge Disposition:  Rehab Facility or Unit (DC - External)    Physician of Record for Attribution - Please select from Treatment Team:  FERNANDO TITUS [[id number]]    Review needed by CMO to determine Physician of Record:  No       Question Answer Comment   Physician of Record for Attribution - Please select from Treatment Team FERNANDO TITUS    Review needed by CMO to determine Physician of Record No        12/14/19 0903

## 2019-12-14 NOTE — PROGRESS NOTES
[facility] Medicine Services  PROGRESS NOTE    Patient Name: Tho Kohli  : 10/8/1927  MRN: [id number]    Date of Admission: 2019  Primary Care Physician: Daniel Adams MD    Subjective   Subjective     CC: F/U bilateral CVA    HPI:  He is doing well today.  Has required I/O cath overnight.    Review of Systems  CV- No chest pain, palpitations  Resp- No cough, dyspnea  GI- No N/V/D, abd pain    Objective   Objective     Vital Signs:   Temp:  [97.5 °F (36.4 °C)-98.6 °F (37 °C)] 97.9 °F (36.6 °C)  Heart Rate:  [62-87] 79  Resp:  [16-18] 18  BP: (110-141)/(73-89) 110/73  Total (NIH Stroke Scale): 1     Physical Exam:  Constitutional: No acute distress, awake, alert, laying in bed  HENT: NCAT, mucous membranes moist  Respiratory: Clear to auscultation bilaterally, respiratory effort normal   Cardiovascular: RRR, no murmurs, rubs, or gallops  Gastrointestinal: Positive bowel sounds, soft, nontender, nondistended  Musculoskeletal: No bilateral ankle edema  Psychiatric: Appropriate affect, cooperative  Neurologic: Speech is dysarthric but improved, no facial asymmetry  Skin: No rashes    Results Reviewed:    Results from last 7 days   Lab Units 19  0600 19  1136 19  1131 19  1130   WBC 10*3/mm3 13.28* 8.83  --   --    HEMOGLOBIN g/dL 12.6* 12.9*  --   --    HEMOGLOBIN, POC g/dL  --   --  13.3  --    HEMATOCRIT % 40.8 39.6  --   --    HEMATOCRIT POC %  --   --  39  --    PLATELETS 10*3/mm3 146 197  --   --    INR   --   --   --  1.0     Results from last 7 days   Lab Units 19  0600 12/10/19  0513 19  1419 19  1136 19  1131   SODIUM mmol/L 140 139  --   --   --    POTASSIUM mmol/L 3.2* 3.6  --   --   --    CHLORIDE mmol/L 103 102  --   --   --    CO2 mmol/L 24.0 25.0  --   --   --    BUN mg/dL 31* 23  --   --   --    CREATININE mg/dL 1.00 1.19  --   --  1.60*   GLUCOSE mg/dL 110* 86  --   --   --    CALCIUM mg/dL 8.1* 9.0  --   --    --    ALT (SGPT) U/L  --   --   --  16  --    AST (SGOT) U/L  --   --   --  21  --    TROPONIN T ng/mL  --   --  0.093* 0.055*  --      Estimated Creatinine Clearance: 48.4 mL/min (by C-G formula based on SCr of 1 mg/dL).    Microbiology Results Abnormal     Procedure Component Value - Date/Time    Urine Culture - Urine, Urine, Clean Catch [[id number]]  (Abnormal)  (Susceptibility) Collected:  12/11/19 0449    Lab Status:  Final result Specimen:  Urine, Clean Catch Updated:  12/13/19 0418     Urine Culture >100,000 CFU/mL Escherichia coli    Susceptibility      Escherichia coli     AMY     Ampicillin Susceptible     Ampicillin + Sulbactam Susceptible     Cefazolin Susceptible     Cefepime Susceptible     Ceftazidime Susceptible     Ceftriaxone Susceptible     Gentamicin Susceptible     Levofloxacin Susceptible     Nitrofurantoin Susceptible     Piperacillin + Tazobactam Susceptible     Tetracycline Susceptible     Trimethoprim + Sulfamethoxazole Susceptible                          Imaging Results (Last 24 Hours)     Procedure Component Value Units Date/Time    Fiberoptic Endo (fees) [[id number]] Resulted:  12/13/19 1101     Updated:  12/13/19 1101    Narrative:       This procedure was auto-finalized with no dictation required.          Results for orders placed during the hospital encounter of 12/09/19   Adult Transesophageal Echo (BLADIMIR) W/ Cont if Necessary Per Protocol    Narrative · Technically difficult and limited study due to presence of hiatal   hernia.  · No evidence of left atrial appendage thrombus  · Bubble study was negative for right to left interatrial shunt. No   evidence PFO  · Trileaflet aortic valve without significant abnormality  · Mild grade 1 plaque in descending aorta and aortic arch  · No cardiac source of embolus identified on this limited study          I have reviewed the medications:  Scheduled Meds:    apixaban 2.5 mg Oral Q12H   atorvastatin 80 mg Oral Nightly   cefTRIAXone 1 g  Intravenous Q24H   sodium chloride 10 mL Intravenous Q12H   tamsulosin 0.4 mg Oral Daily     Continuous Infusions:   PRN Meds:.•  acetaminophen **OR** acetaminophen **OR** acetaminophen  •  ondansetron  •  potassium chloride **OR** potassium chloride **OR** potassium chloride  •  sodium chloride  •  sodium chloride      Assessment/Plan   Assessment / Plan     Active Hospital Problems    Diagnosis  POA   • **Stroke (CMS/[facility]) [I63.9]  Yes   • Grade III diastolic dysfunction [I51.9]  Yes   • Elevated troponin [R79.89]  Yes   • Abnormal EKG [R94.31]  Yes   • Essential hypertension [I10]  Yes   • Dysarthria [R47.1]  Yes   • SALVADOR (acute kidney injury) (CMS/[facility]) [N17.9]  Yes      Resolved Hospital Problems   No resolved problems to display.        Brief Hospital Course to date:  Tho Kohli is a 89 y.o. male admitted with acute bilateral cerebral infarcts.    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 Coli UTI, can transition to PO ceftin at discharge for 5 day course  -If urinary retention and hematuria persist, he will need outpatient urology evaluation.     HTN  -Resume home medications as tolerated, BP low-normal currently.  On HCTZ and losartan at home.    DVT Prophylaxis: Eliquis    CODE STATUS:   Code Status and Medical Interventions:   Ordered at: 12/09/19 1402     Limited Support to NOT Include:    Antiarrhythmic Drugs    Intubation     Level Of Support Discussed With:     Patient     Code Status:    No CPR     Medical Interventions (Level of Support Prior to Arrest):    Limited         Electronically signed by Candi Hong MD, 12/13/19, 7:00 PM.

## 2019-12-14 NOTE — PROGRESS NOTES
Case Management Discharge Note      Final Note: Patient has discharge orders. Per review of previous CM note, patient will transfer today at 11:30 via the [employer] medical van to [facility]. CM will fax the discharge summary when available. RN, please call report to the Stroke Unit, ph. [phone], and please send a copy of the DC summary/AVS in the discharge packet. Thank you.     Provided post acute provider list?: Yes  Post Acute Provider Lists: Inpatient Rehab  Post Acuite Provider List: Delivered  Delivered To: Patient  Method of Delivery: In person     CORRECTION: Phone number for stroke unit is [phone].     Destination - Selection Complete      Service Provider Request Status Selected Services Address Phone Number Fax Number    [facility] Selected Inpatient Rehabilitation [address] [address] [phone]      Durable Medical Equipment      No service has been selected for the patient.      Dialysis/Infusion      No service has been selected for the patient.      Home Medical Care      No service has been selected for the patient.      Therapy      No service has been selected for the patient.      Community Resources      No service has been selected for the patient.        Transportation Services  Ambulance: [facility](Scheduled for 1130 with trip number [id number].  Trip will cost 77 dollars)    Final Discharge Disposition Code: 62 - inpatient rehab facility

## 2019-12-14 NOTE — DISCHARGE SUMMARY
[facility] Medicine Services  TRANSFER SUMMARY    Patient Name: Tho Kohli  : 10/8/1927  MRN: [id number]    Date of Admission: 2019  Date of Discharge: 19  Length of Stay: 5  Primary Care Physician: Daniel Adams MD    Consults     Date and Time Order Name Status Description    12/10/2019 1219 Inpatient Cardiology Consult      2019 1438 Inpatient Neurology Consult Stroke      2019 1120 Inpatient Neurology Consult Stroke            Hospital Course     Presenting Problem:   Dysarthria [R47.1]    Active Hospital Problems    Diagnosis  POA   • **Stroke (CMS/HCC) [I63.9]  Yes   • Grade III diastolic dysfunction [I51.9]  Yes   • Elevated troponin [R79.89]  Yes   • Abnormal EKG [R94.31]  Yes   • Essential hypertension [I10]  Yes   • Dysarthria [R47.1]  Yes      Resolved Hospital Problems    Diagnosis Date Resolved POA   • SALVADOR (acute kidney injury) (CMS/Formerly [facility]) [N17.9] 2019 Yes          Hospital Course:  Tho Kohli is a 89 y.o. male with PMH significant for HTN, SALVADOR, Grade III diastolic dysfunction, SDH 2007, overactive bladder who was admitted to [facility] on 19 with acute bilateral cerebral infarcts from his assisted living facility after being found down.     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  -Neurology and cardiology follow ups to be scheduled after rehab completion  -Conitnue Eliquis 2.5mg BID per cardiology recs  -Will remain off ASA while on Eliquis due to risk of bleeding/age  -Continue atorvastatin  -BLADIMIR on 2019 without thrombus  -Cardiac monitor to be arranged by cardiology after he completes rehabilitation      Right lower extremity swelling  -Duplex ultrasound bilaterally negative for DVT     Dysarthria and dysphagia  -dysarthria, significant improvement  -dysphagia diet, nectar thick liquids per SLP recs      Acute urinary retention  Hematuria  UTI  -Continue Flomax  -In and out cath per protocol as needed  -Transition to  Ceftin PO x4 days to start tomorrow 12/15/19  -If urinary retention and hematuria persist, he will need outpatient urology evaluation.      HTN  -Resume home medications, on HCTZ and losartan at home.    Discharge Follow Up Recommendations for labs/diagnostics:  Follow up with PCP 1 week after discharge from inpatient rehab for transition of care follow up.   Follow up with Cardiologist Dr. Calle after inpatient rehab to get 30 day event monitor.  Follow up with [facility]rology in 1 month.    Day of Discharge     HPI:   Patient is resting in bed comfortably this morning. No family currently at bedside. He denies any problems overnight and states that he slept well. He reports that his speech has drastically improved since admission, but he still has some left leg weakness. He is understanding and agreeable with the plan to transfer to [facility] inpatient rehab this afternoon. He reports that he continues to have urinary retention. He denies any new or worsening symptoms.     Review of Systems   Constitutional: Positive for fatigue. Negative for chills and fever.   HENT: Positive for trouble swallowing. Negative for voice change.    Eyes: Negative for photophobia and visual disturbance.   Respiratory: Negative for chest tightness, shortness of breath and wheezing.    Cardiovascular: Negative for chest pain and leg swelling.   Gastrointestinal: Negative for abdominal pain, constipation, diarrhea and vomiting.   Genitourinary: Positive for difficulty urinating and urgency.   Musculoskeletal: Positive for gait problem. Negative for back pain.   Skin: Negative for color change and pallor.   Allergic/Immunologic: Negative.    Neurological: Negative for dizziness, facial asymmetry, speech difficulty, light-headedness and headaches.   Hematological: Negative.    Psychiatric/Behavioral: Negative for confusion and suicidal ideas. The patient is not nervous/anxious.        Otherwise complete ROS is  negative except as mentioned in the HPI.    Vital Signs:   Temp:  [97.3 °F (36.3 °C)-99.4 °F (37.4 °C)] 97.3 °F (36.3 °C)  Heart Rate:  [] 83  Resp:  [16-18] 18  BP: (105-166)/(73-96) 166/96     Physical Exam:  Constitutional: Awake, alert  Eyes: PERRL  HENT: NCAT, mucous membranes moist  Neck: Supple, no thyromegaly, no lymphadenopathy, trachea midline  Respiratory: Clear to auscultation bilaterally, nonlabored respirations   Cardiovascular: RRR, no murmurs, rubs, or gallops, palpable pedal pulses bilaterally  Gastrointestinal: Positive bowel sounds, soft, nontender, nondistended  Musculoskeletal: No bilateral ankle edema, no clubbing or cyanosis to extremities  Psychiatric: Appropriate affect, cooperative  Neurologic: Oriented x 3, RLE > LLE, speech clear  Skin: No rashes      Pertinent Results     Results from last 7 days   Lab Units 12/14/19  0432 12/14/19  0032 12/13/19  0600 12/10/19  0513 12/09/19  1136 12/09/19  1131   WBC 10*3/mm3  --   --  13.28*  --  8.83  --    HEMOGLOBIN g/dL  --   --  12.6*  --  12.9*  --    HEMOGLOBIN, POC g/dL  --   --   --   --   --  13.3   HEMATOCRIT %  --   --  40.8  --  39.6  --    HEMATOCRIT POC %  --   --   --   --   --  39   PLATELETS 10*3/mm3  --   --  146  --  197  --    SODIUM mmol/L 140  --  140 139  --   --    POTASSIUM mmol/L 3.8 4.2 3.2* 3.6  --   --    CHLORIDE mmol/L 105  --  103 102  --   --    CO2 mmol/L 24.0  --  24.0 25.0  --   --    BUN mg/dL 33*  --  31* 23  --   --    CREATININE mg/dL 0.99  --  1.00 1.19  --  1.60*   GLUCOSE mg/dL 90  --  110* 86  --   --    CALCIUM mg/dL 8.7  --  8.1* 9.0  --   --      Results from last 7 days   Lab Units 12/09/19  1136 12/09/19  1130   ALT (SGPT) U/L 16  --    AST (SGOT) U/L 21  --    PROTIME seconds  --  12.4*   INR   --  1.0   APTT seconds 29.7  --      Results from last 7 days   Lab Units 12/10/19  0513   CHOLESTEROL mg/dL 133   TRIGLYCERIDES mg/dL 46   HDL CHOL mg/dL 57     Results from last 7 days   Lab Units  12/10/19  0513   HEMOGLOBIN A1C % 5.30     Brief Urine Lab Results  (Last result in the past 365 days)      Color   Clarity   Blood   Leuk Est   Nitrite   Protein   CREAT   Urine HCG        12/11/19 0449 Yellow Clear Large (3+) Trace Negative >=300 mg/dL (3+)               Microbiology Results Abnormal     Procedure Component Value - Date/Time    Urine Culture - Urine, Urine, Clean Catch [[id number]]  (Abnormal)  (Susceptibility) Collected:  12/11/19 0449    Lab Status:  Final result Specimen:  Urine, Clean Catch Updated:  12/13/19 0418     Urine Culture >100,000 CFU/mL Escherichia coli    Susceptibility      Escherichia coli     AMY     Ampicillin Susceptible     Ampicillin + Sulbactam Susceptible     Cefazolin Susceptible     Cefepime Susceptible     Ceftazidime Susceptible     Ceftriaxone Susceptible     Gentamicin Susceptible     Levofloxacin Susceptible     Nitrofurantoin Susceptible     Piperacillin + Tazobactam Susceptible     Tetracycline Susceptible     Trimethoprim + Sulfamethoxazole Susceptible                          Imaging Results (All)     Procedure Component Value Units Date/Time    Fiberoptic Endo (fees) [[id number]] Resulted:  12/13/19 1101     Updated:  12/13/19 1101    Narrative:       This procedure was auto-finalized with no dictation required.    FL Video Swallow With Speech [[id number]] Collected:  12/10/19 1453     Updated:  12/10/19 1626    Narrative:       EXAMINATION: FL VIDEO SWALLOW W SPEECH-     INDICATION: dysphagia; R47.1-Dysarthria and anarthria; R53.1-Weakness;  Z74.09-Other reduced mobility     TECHNIQUE: 1 minute and 42 seconds of fluoroscopic time was used for  this exam. 1 associated image was saved. The patient was evaluated in  the seated lateral position while taking a variety of consistencies of  barium by mouth under the direction of speech pathology.     COMPARISON: NONE     FINDINGS: There was aspiration with sips of thin barium. There was no  penetration and no aspiration  with nectar, or any of the barium with a  modified chin tuck. There was no penetration and no aspiration with  pudding, or solid consistency barium.          Impression:       Fluoroscopy provided for a modified barium swallow. Please  see speech therapy report for full details and recommendations.         This report was finalized on 12/10/2019 4:23 PM by Dr. Fernando Contreras.       MRI Brain Without Contrast [[id number]] Collected:  12/10/19 0829     Updated:  12/10/19 1125    Narrative:       EXAMINATION: MRI BRAIN WO CONTRAST- 12/10/2019     INDICATION: Stroke; R47.1-Dysarthria and anarthria; R53.1-Weakness     TECHNIQUE: Sagittal and axial images of brain are displayed. No  intravenous contrast was utilized.     COMPARISON: NONE     FINDINGS: In addition to extensive age-related change, there are small  punctate areas of abnormal signal lateral to each lateral ventricle as  well as in the right thalamic region and right lentiform nucleus. There  are associated areas of restricted diffusion consistent with small  bilateral embolic infarcts.       Impression:       There are small bilateral acute infarcts presumably embolic  in nature. Otherwise there are age-related findings.     D:  12/10/2019  E:  12/10/2019         This report was finalized on 12/10/2019 11:22 AM by Dr. Humberto Ramos MD.       XR Chest 1 View [[id number]] Collected:  12/09/19 1205     Updated:  12/10/19 0834    Narrative:       EXAMINATION: XR CHEST 1 VW-12/09/2019:      INDICATION: Acute Stroke Protocol (onset < 12 hrs).      COMPARISON: Chest radiograph 09/30/2007.     FINDINGS: Single portable chest radiograph was submitted for review. The  heart is not particularly enlarged. There is what appears to be a very  large 16 cm transverse measured hiatal hernia with an air-fluid level.  Chronic changes in the lungs are identified without convincing evidence  for active airspace disease. No pleural effusion or pneumothorax. The  visualized upper  abdomen is unrevealing. Diffuse osteopenia suggested.            Impression:       1.  What appears to be a large hiatal hernia measuring up to 16 cm in  transverse diameter with an air-fluid level. Consider follow-up CT  imaging of the chest for further evaluation if this is not clinically  known.  2.  Chronic changes in the lungs identified without evidence of active  airspace disease.     D:  12/09/2019  E:  12/09/2019     This report was finalized on 12/10/2019 8:31 AM by Dr. Nilson Robins MD.       CT Cerebral Perfusion With & Without Contrast [[id number]] Collected:  12/09/19 1152     Updated:  12/10/19 0833    Narrative:       EXAMINATION: CT CEREBRAL PERFUSION W WO CONTRAST- 12/09/2019     INDICATION: TIA, initial screening     TECHNIQUE: Cerebral perfusion analysis was performed using computed  tomography with contrast administration, including post processing of  parametric maps with determination of cerebral blood flow, cerebral  blood volume, and mean transit time.     The radiation dose reduction device was turned on for each scan per the  ALARA (As Low as Reasonably Achievable) protocol.     COMPARISON: CT of the head 12/09/2019     FINDINGS: Dedicated perfusion imaging of the brain demonstrates no focal  increased mean transit time abnormality within a vascular distribution  to suggest large territorial reversible infarcts. Rapid analysis  demonstrates focal increased mismatch volume within the posterior  circulation of the occipital lobes bilaterally with an additional  increased TMAX abnormality within the right parietal lobe measuring 11  mm greater than 6 seconds. There is some misregistration artifact along  the left cerebral convexity at the site of prior surgery/dural  thickening.       Impression:       No convincing perfusion abnormality within a vascular  territory to suggest large ischemic defect. There is focal increased  TMAX abnormality within the right parietal and multifocal regions  within  the occipital lobe which could be artifactual/slow flow related however  small vessel infarcts are not excluded. Consider follow-up MRI imaging  of brain which would provide a more detailed evaluation.     D:  12/09/2019  E:  12/09/2019     This report was finalized on 12/10/2019 8:30 AM by Dr. Nilson Robins MD.       CT Angiogram Head [[id number]] Collected:  12/09/19 1202     Updated:  12/10/19 0833    Narrative:       EXAMINATION: CT ANGIOGRAM HEAD-, CT ANGIOGRAM NECK- 12/09/2019     INDICATION: Stroke     TECHNIQUE: Unenhanced CT imaging of the head and neck was performed  followed by dedicated CTA imaging of the head and neck vasculature with  additional multiplanar 3-D MIP and VRT reconstructed imaging.     Stenosis measurement was performed by the NASCET or similar method.     The radiation dose reduction device was turned on for each scan per the  ALARA (As Low as Reasonably Achievable) protocol.     COMPARISON: CT of the head 12/09/2019     FINDINGS:      UNENHANCED: The visualized lung apex demonstrates an accessory azygos  fissure. Fibronodular scarring is noted. Identified with the left lung  apex is a 1.5 x 0.8 cm nodule which may relate to scarring, although  follow-up as clinically indicated. No hyperdensity identified within the  carotid arteries to suggest intramural hematoma.     CTA IMAGING: Dedicated CTA imaging demonstrates no convincing evidence  of filling defect within the pulmonary arterial vasculature as  visualized. The ascending aorta does not demonstrate acute abnormality.  The ascending aorta measures up to 3.7 cm in maximum dimension. The  bilateral subclavian arteries appear patent. The bilateral common  carotid arteries are tortuous and demonstrates scattered atherosclerotic  calcifications without significant narrowing identified. The carotid  bifurcations are patent with only minimal calcified atherosclerotic  disease. The external carotid systems appear patent  bilaterally. The  right common carotid artery is patent without evidence of occlusion or  significant stenosis. The cavernous and clinoid and supraclinoid  portions appear intact. The left common carotid artery appears patent  without significant stenosis. There is focal dilation measuring up to  1.1 cm involving the proximal cervical portion of the left internal  carotid artery. The remainder of the left internal carotid artery  appears patent without significant occlusion or stenosis identified. The  clinoid and supraclinoid portions appear patent. The right middle  cerebral artery appears patent without significant narrowing or  occlusion identified. The left middle cerebral artery appears patent  without convincing evidence for significant occlusion or narrowing. The  anterior cerebral arteries appear patent without significant stenosis or  aneurysm identified. Persistent right fetal PCA anatomy is identified.  No significant stenosis of the right posterior cerebral artery  identified. The left posterior cerebral artery appears patent. The  basilar artery appears patent. Left dominant vertebral artery system is  identified although the vertebral arteries appear patent throughout  their course in the intervertebral foramen and into the upper chest.       Impression:       1. CTA evaluation of the head and neck demonstrates no convincing  evidence for large territorial vessel occlusion or stenosis.     2. There is focal prominence of the cervical left internal carotid  artery measuring up to 1.1 cm.     3. Incidentally identified 1.5 cm left upper lobe pulmonary nodule which  could relate to scarring although follow-up is clinically indicated when  the patient is able.     D:  12/09/2019  E:  12/09/2019     This report was finalized on 12/10/2019 8:30 AM by Dr. Nilson Robins MD.       CT Angiogram Neck [[id number]] Collected:  12/09/19 1202     Updated:  12/10/19 0833    Narrative:       EXAMINATION: CT  ANGIOGRAM HEAD-, CT ANGIOGRAM NECK- 12/09/2019     INDICATION: Stroke     TECHNIQUE: Unenhanced CT imaging of the head and neck was performed  followed by dedicated CTA imaging of the head and neck vasculature with  additional multiplanar 3-D MIP and VRT reconstructed imaging.     Stenosis measurement was performed by the NASCET or similar method.     The radiation dose reduction device was turned on for each scan per the  ALARA (As Low as Reasonably Achievable) protocol.     COMPARISON: CT of the head 12/09/2019     FINDINGS:      UNENHANCED: The visualized lung apex demonstrates an accessory azygos  fissure. Fibronodular scarring is noted. Identified with the left lung  apex is a 1.5 x 0.8 cm nodule which may relate to scarring, although  follow-up as clinically indicated. No hyperdensity identified within the  carotid arteries to suggest intramural hematoma.     CTA IMAGING: Dedicated CTA imaging demonstrates no convincing evidence  of filling defect within the pulmonary arterial vasculature as  visualized. The ascending aorta does not demonstrate acute abnormality.  The ascending aorta measures up to 3.7 cm in maximum dimension. The  bilateral subclavian arteries appear patent. The bilateral common  carotid arteries are tortuous and demonstrates scattered atherosclerotic  calcifications without significant narrowing identified. The carotid  bifurcations are patent with only minimal calcified atherosclerotic  disease. The external carotid systems appear patent bilaterally. The  right common carotid artery is patent without evidence of occlusion or  significant stenosis. The cavernous and clinoid and supraclinoid  portions appear intact. The left common carotid artery appears patent  without significant stenosis. There is focal dilation measuring up to  1.1 cm involving the proximal cervical portion of the left internal  carotid artery. The remainder of the left internal carotid artery  appears patent without  significant occlusion or stenosis identified. The  clinoid and supraclinoid portions appear patent. The right middle  cerebral artery appears patent without significant narrowing or  occlusion identified. The left middle cerebral artery appears patent  without convincing evidence for significant occlusion or narrowing. The  anterior cerebral arteries appear patent without significant stenosis or  aneurysm identified. Persistent right fetal PCA anatomy is identified.  No significant stenosis of the right posterior cerebral artery  identified. The left posterior cerebral artery appears patent. The  basilar artery appears patent. Left dominant vertebral artery system is  identified although the vertebral arteries appear patent throughout  their course in the intervertebral foramen and into the upper chest.       Impression:       1. CTA evaluation of the head and neck demonstrates no convincing  evidence for large territorial vessel occlusion or stenosis.     2. There is focal prominence of the cervical left internal carotid  artery measuring up to 1.1 cm.     3. Incidentally identified 1.5 cm left upper lobe pulmonary nodule which  could relate to scarring although follow-up is clinically indicated when  the patient is able.     D:  12/09/2019  E:  12/09/2019     This report was finalized on 12/10/2019 8:30 AM by Dr. Nilson Robins MD.       CT Head Without Contrast Stroke Protocol [[id number]] Collected:  12/09/19 1137     Updated:  12/10/19 0833    Narrative:       EXAMINATION: CT HEAD WO CONTRAST STROKE PROTOCOL- 12/09/2019     INDICATION: Stroke     TECHNIQUE: Unenhanced CT imaging of the head was performed during a code  stroke protocol     The radiation dose reduction device was turned on for each scan per the  ALARA (As Low as Reasonably Achievable) protocol.     COMPARISON: CT of the head 12/04/2007     FINDINGS: Unenhanced CT imaging of the brain demonstrates no convincing  evidence for midline shift or  mass effect. No evidence of hydrocephalus.  Diffuse cerebral atrophy is identified. There is no convincing evidence  for extra-axial fluid collection. Identified within the left lateral  extra-axial cerebral convexity is a hyperdense linear region adjacent to  a prior craniotomy defect consistent with thickened dura which is  significantly improved from 12/04/2007. There is no convincing evidence  of extra-axial hemorrhage within this region. The gray-white matter  junction demonstrate multifocal regions of low attenuation throughout  the periventricular and subcortical white matter most pronounced  involving the right parietal lobe. This could relate to chronic  microvascular ischemic change however small infarcts cannot be entirely  excluded particularly within the right parietal region. The skull  appears intact with the exception of the prior left craniotomy defect  without evidence for depressed skull fracture. Paranasal sinuses appear  clear. Incidental note of cavum septum pellucidum, an anatomic variant.       Impression:       1.  No convincing evidence for acute intracranial pathology identified.  There is linear hyperattenuation along the left cerebral convexity  adjacent to the prior craniotomy defect favored to represent residual  thickened dura rather than hemorrhage. Consider follow-up imaging on MRI  which may provide additional evaluation.     2. Multifocal confluent hypoattenuation within the white matter as  described above most pronounced involving the right parietal lobe. Small  infarct/ischemia would be difficult to entirely exclude. Attention on  follow-up more advanced imaging advised.     These findings were discussed with Dr. Velazquez at exam time 11:16 AM on  12/09/2019.     D:  12/09/2019  E:  12/09/2019        This report was finalized on 12/10/2019 8:30 AM by Dr. Nilson Robins MD.             Results for orders placed during the hospital encounter of 12/09/19   Adult Transesophageal  Echo (BLADIMIR) W/ Cont if Necessary Per Protocol    Narrative · Technically difficult and limited study due to presence of hiatal   hernia.  · No evidence of left atrial appendage thrombus  · Bubble study was negative for right to left interatrial shunt. No   evidence PFO  · Trileaflet aortic valve without significant abnormality  · Mild grade 1 plaque in descending aorta and aortic arch  · No cardiac source of embolus identified on this limited study              Discharge Details        Discharge Medications      New Medications      Instructions Start Date   apixaban 2.5 MG tablet tablet  Commonly known as:  ELIQUIS   2.5 mg, Oral, Every 12 Hours Scheduled      atorvastatin 80 MG tablet  Commonly known as:  LIPITOR   80 mg, Oral, Nightly      cefuroxime 250 MG tablet  Commonly known as:  CEFTIN   250 mg, Oral, 2 Times Daily   Start Date:  December 15, 2019     saccharomyces boulardii 250 MG capsule  Commonly known as:  FLORASTOR   250 mg, Oral, 2 Times Daily      tamsulosin 0.4 MG capsule 24 hr capsule  Commonly known as:  FLOMAX   0.4 mg, Oral, Daily         Continue These Medications      Instructions Start Date   hydroCHLOROthiazide 25 MG tablet  Commonly known as:  HYDRODIURIL   25 mg, Oral, Daily      losartan 25 MG tablet  Commonly known as:  COZAAR   25 mg, Oral, Daily             No Known Allergies      Discharge Disposition:  Rehab Facility or Unit (DC - External)    Discharge Diet:  Diet Order   Procedures   • Diet Dysphagia; IV - Mechanical Soft No Mixed Consistencies; Nectar / Syrup Thick; No Straws; Cardiac       Discharge Activity:  Activity Instructions     Activity as Tolerated              CODE STATUS:    Code Status and Medical Interventions:   Ordered at: 12/09/19 1402     Limited Support to NOT Include:    Antiarrhythmic Drugs    Intubation     Level Of Support Discussed With:    Patient     Code Status:    No CPR     Medical Interventions (Level of Support Prior to Arrest):    Limited         No  future appointments.    Additional Instructions for the Follow-ups that You Need to Schedule     Discharge Follow-up with Specialty: [facility]; 1 Month   As directed      Specialty:  [facility]    Follow Up:  1 Month    Follow Up Details:  after rehab discharge         Discharge Follow-up with Specified Provider: Dr. Calle; 2 Weeks   As directed      To:  Dr. Calle    Follow Up:  2 Weeks    Follow Up Details:  1-2 weeks after discharge from rehab for 30 day event monitor               Time Spent on Discharge:  36 minutes    Electronically signed by WILLA Julien, 12/14/19, 9:04 AM.

## 2019-12-30 DIAGNOSIS — I48.91 ATRIAL FIBRILLATION, UNSPECIFIED TYPE (HCC): ICD-10-CM

## 2019-12-30 DIAGNOSIS — I63.9 CEREBROVASCULAR ACCIDENT (CVA), UNSPECIFIED MECHANISM (HCC): Primary | ICD-10-CM

## 2020-02-05 ENCOUNTER — APPOINTMENT (OUTPATIENT)
Dept: GENERAL RADIOLOGY | Facility: HOSPITAL | Age: 85
End: 2020-02-05

## 2020-02-05 ENCOUNTER — HOSPITAL ENCOUNTER (OUTPATIENT)
Facility: HOSPITAL | Age: 85
Setting detail: OBSERVATION
Discharge: NURSING, REHAB OR LONG-TERM CARE | End: 2020-02-07
Attending: EMERGENCY MEDICINE | Admitting: INTERNAL MEDICINE

## 2020-02-05 DIAGNOSIS — Z74.09 IMPAIRED FUNCTIONAL MOBILITY, BALANCE, GAIT, AND ENDURANCE: ICD-10-CM

## 2020-02-05 DIAGNOSIS — N39.0 URINARY TRACT INFECTION WITHOUT HEMATURIA, SITE UNSPECIFIED: ICD-10-CM

## 2020-02-05 DIAGNOSIS — R53.1 WEAKNESS: ICD-10-CM

## 2020-02-05 DIAGNOSIS — A41.9 SEPSIS, DUE TO UNSPECIFIED ORGANISM, UNSPECIFIED WHETHER ACUTE ORGAN DYSFUNCTION PRESENT (HCC): Primary | ICD-10-CM

## 2020-02-05 LAB
ALBUMIN SERPL-MCNC: 3.9 G/DL (ref 3.5–5.2)
ALBUMIN/GLOB SERPL: 1.2 G/DL
ALP SERPL-CCNC: 123 U/L (ref 39–117)
ALT SERPL W P-5'-P-CCNC: 27 U/L (ref 1–41)
ANION GAP SERPL CALCULATED.3IONS-SCNC: 11 MMOL/L (ref 5–15)
AST SERPL-CCNC: 32 U/L (ref 1–40)
BACTERIA UR QL AUTO: ABNORMAL /HPF
BASOPHILS # BLD AUTO: 0.04 10*3/MM3 (ref 0–0.2)
BASOPHILS NFR BLD AUTO: 0.2 % (ref 0–1.5)
BILIRUB SERPL-MCNC: 0.8 MG/DL (ref 0.2–1.2)
BILIRUB UR QL STRIP: NEGATIVE
BUN BLD-MCNC: 25 MG/DL (ref 8–23)
BUN/CREAT SERPL: 23.6 (ref 7–25)
CALCIUM SPEC-SCNC: 9.2 MG/DL (ref 8.2–9.6)
CHLORIDE SERPL-SCNC: 105 MMOL/L (ref 98–107)
CLARITY UR: ABNORMAL
CO2 SERPL-SCNC: 25 MMOL/L (ref 22–29)
COLOR UR: YELLOW
CREAT BLD-MCNC: 1.06 MG/DL (ref 0.76–1.27)
DEPRECATED RDW RBC AUTO: 51.2 FL (ref 37–54)
EOSINOPHIL # BLD AUTO: 0.01 10*3/MM3 (ref 0–0.4)
EOSINOPHIL NFR BLD AUTO: 0.1 % (ref 0.3–6.2)
ERYTHROCYTE [DISTWIDTH] IN BLOOD BY AUTOMATED COUNT: 14 % (ref 12.3–15.4)
GFR SERPL CREATININE-BSD FRML MDRD: 65 ML/MIN/1.73
GLOBULIN UR ELPH-MCNC: 3.2 GM/DL
GLUCOSE BLD-MCNC: 126 MG/DL (ref 65–99)
GLUCOSE UR STRIP-MCNC: NEGATIVE MG/DL
HCT VFR BLD AUTO: 35.7 % (ref 37.5–51)
HGB BLD-MCNC: 12 G/DL (ref 13–17.7)
HGB UR QL STRIP.AUTO: ABNORMAL
HOLD SPECIMEN: NORMAL
HOLD SPECIMEN: NORMAL
HYALINE CASTS UR QL AUTO: ABNORMAL /LPF
IMM GRANULOCYTES # BLD AUTO: 0.09 10*3/MM3 (ref 0–0.05)
IMM GRANULOCYTES NFR BLD AUTO: 0.5 % (ref 0–0.5)
KETONES UR QL STRIP: NEGATIVE
LEUKOCYTE ESTERASE UR QL STRIP.AUTO: ABNORMAL
LYMPHOCYTES # BLD AUTO: 0.62 10*3/MM3 (ref 0.7–3.1)
LYMPHOCYTES NFR BLD AUTO: 3.4 % (ref 19.6–45.3)
MAGNESIUM SERPL-MCNC: 1.4 MG/DL (ref 1.7–2.3)
MCH RBC QN AUTO: 33.2 PG (ref 26.6–33)
MCHC RBC AUTO-ENTMCNC: 33.6 G/DL (ref 31.5–35.7)
MCV RBC AUTO: 98.9 FL (ref 79–97)
MONOCYTES # BLD AUTO: 1.61 10*3/MM3 (ref 0.1–0.9)
MONOCYTES NFR BLD AUTO: 8.8 % (ref 5–12)
NEUTROPHILS # BLD AUTO: 16.02 10*3/MM3 (ref 1.7–7)
NEUTROPHILS NFR BLD AUTO: 87 % (ref 42.7–76)
NITRITE UR QL STRIP: POSITIVE
NRBC BLD AUTO-RTO: 0 /100 WBC (ref 0–0.2)
PH UR STRIP.AUTO: 5.5 [PH] (ref 5–8)
PLATELET # BLD AUTO: 176 10*3/MM3 (ref 140–450)
PMV BLD AUTO: 10.6 FL (ref 6–12)
POTASSIUM BLD-SCNC: 3.8 MMOL/L (ref 3.5–5.2)
PROT SERPL-MCNC: 7.1 G/DL (ref 6–8.5)
PROT UR QL STRIP: ABNORMAL
RBC # BLD AUTO: 3.61 10*6/MM3 (ref 4.14–5.8)
RBC # UR: ABNORMAL /HPF
REF LAB TEST METHOD: ABNORMAL
SODIUM BLD-SCNC: 141 MMOL/L (ref 136–145)
SP GR UR STRIP: 1.02 (ref 1–1.03)
SQUAMOUS #/AREA URNS HPF: ABNORMAL /HPF
TROPONIN T SERPL-MCNC: 0.02 NG/ML (ref 0–0.03)
UROBILINOGEN UR QL STRIP: ABNORMAL
WBC NRBC COR # BLD: 18.39 10*3/MM3 (ref 3.4–10.8)
WBC UR QL AUTO: ABNORMAL /HPF
WHOLE BLOOD HOLD SPECIMEN: NORMAL
WHOLE BLOOD HOLD SPECIMEN: NORMAL

## 2020-02-05 PROCEDURE — 87077 CULTURE AEROBIC IDENTIFY: CPT | Performed by: EMERGENCY MEDICINE

## 2020-02-05 PROCEDURE — 96365 THER/PROPH/DIAG IV INF INIT: CPT

## 2020-02-05 PROCEDURE — P9612 CATHETERIZE FOR URINE SPEC: HCPCS

## 2020-02-05 PROCEDURE — G0378 HOSPITAL OBSERVATION PER HR: HCPCS

## 2020-02-05 PROCEDURE — 99220 PR INITIAL OBSERVATION CARE/DAY 70 MINUTES: CPT | Performed by: HOSPITALIST

## 2020-02-05 PROCEDURE — 25010000002 MAGNESIUM SULFATE 2 GM/50ML SOLUTION: Performed by: HOSPITALIST

## 2020-02-05 PROCEDURE — 84484 ASSAY OF TROPONIN QUANT: CPT | Performed by: EMERGENCY MEDICINE

## 2020-02-05 PROCEDURE — 87186 SC STD MICRODIL/AGAR DIL: CPT | Performed by: EMERGENCY MEDICINE

## 2020-02-05 PROCEDURE — 83735 ASSAY OF MAGNESIUM: CPT | Performed by: EMERGENCY MEDICINE

## 2020-02-05 PROCEDURE — 71045 X-RAY EXAM CHEST 1 VIEW: CPT

## 2020-02-05 PROCEDURE — 87040 BLOOD CULTURE FOR BACTERIA: CPT | Performed by: EMERGENCY MEDICINE

## 2020-02-05 PROCEDURE — 93005 ELECTROCARDIOGRAM TRACING: CPT | Performed by: EMERGENCY MEDICINE

## 2020-02-05 PROCEDURE — 80053 COMPREHEN METABOLIC PANEL: CPT | Performed by: EMERGENCY MEDICINE

## 2020-02-05 PROCEDURE — 99285 EMERGENCY DEPT VISIT HI MDM: CPT

## 2020-02-05 PROCEDURE — 87086 URINE CULTURE/COLONY COUNT: CPT | Performed by: EMERGENCY MEDICINE

## 2020-02-05 PROCEDURE — 96361 HYDRATE IV INFUSION ADD-ON: CPT

## 2020-02-05 PROCEDURE — 85025 COMPLETE CBC W/AUTO DIFF WBC: CPT | Performed by: EMERGENCY MEDICINE

## 2020-02-05 PROCEDURE — 25010000002 CEFTRIAXONE PER 250 MG: Performed by: EMERGENCY MEDICINE

## 2020-02-05 PROCEDURE — 96367 TX/PROPH/DG ADDL SEQ IV INF: CPT

## 2020-02-05 PROCEDURE — 81001 URINALYSIS AUTO W/SCOPE: CPT | Performed by: EMERGENCY MEDICINE

## 2020-02-05 RX ORDER — LIDOCAINE 50 MG/G
1 PATCH TOPICAL
Status: DISCONTINUED | OUTPATIENT
Start: 2020-02-05 | End: 2020-02-07 | Stop reason: HOSPADM

## 2020-02-05 RX ORDER — ATORVASTATIN CALCIUM 40 MG/1
80 TABLET, FILM COATED ORAL NIGHTLY
Status: DISCONTINUED | OUTPATIENT
Start: 2020-02-05 | End: 2020-02-07 | Stop reason: HOSPADM

## 2020-02-05 RX ORDER — MAGNESIUM SULFATE HEPTAHYDRATE 40 MG/ML
2 INJECTION, SOLUTION INTRAVENOUS AS NEEDED
Status: DISCONTINUED | OUTPATIENT
Start: 2020-02-05 | End: 2020-02-07 | Stop reason: HOSPADM

## 2020-02-05 RX ORDER — TAMSULOSIN HYDROCHLORIDE 0.4 MG/1
0.4 CAPSULE ORAL DAILY
Status: DISCONTINUED | OUTPATIENT
Start: 2020-02-06 | End: 2020-02-07 | Stop reason: HOSPADM

## 2020-02-05 RX ORDER — ACETAMINOPHEN 325 MG/1
650 TABLET ORAL EVERY 6 HOURS PRN
COMMUNITY

## 2020-02-05 RX ORDER — SODIUM CHLORIDE 0.9 % (FLUSH) 0.9 %
10 SYRINGE (ML) INJECTION EVERY 12 HOURS SCHEDULED
Status: DISCONTINUED | OUTPATIENT
Start: 2020-02-05 | End: 2020-02-07 | Stop reason: HOSPADM

## 2020-02-05 RX ORDER — SODIUM CHLORIDE, SODIUM LACTATE, POTASSIUM CHLORIDE, CALCIUM CHLORIDE 600; 310; 30; 20 MG/100ML; MG/100ML; MG/100ML; MG/100ML
100 INJECTION, SOLUTION INTRAVENOUS CONTINUOUS
Status: DISCONTINUED | OUTPATIENT
Start: 2020-02-05 | End: 2020-02-06

## 2020-02-05 RX ORDER — PANTOPRAZOLE SODIUM 40 MG/1
40 TABLET, DELAYED RELEASE ORAL DAILY
COMMUNITY

## 2020-02-05 RX ORDER — ZINC OXIDE 13 %
1 CREAM (GRAM) TOPICAL DAILY
COMMUNITY

## 2020-02-05 RX ORDER — PANTOPRAZOLE SODIUM 40 MG/1
40 TABLET, DELAYED RELEASE ORAL DAILY
Status: DISCONTINUED | OUTPATIENT
Start: 2020-02-06 | End: 2020-02-07 | Stop reason: HOSPADM

## 2020-02-05 RX ORDER — ONDANSETRON 4 MG/1
4 TABLET, FILM COATED ORAL EVERY 6 HOURS PRN
Status: DISCONTINUED | OUTPATIENT
Start: 2020-02-05 | End: 2020-02-07 | Stop reason: HOSPADM

## 2020-02-05 RX ORDER — POTASSIUM CHLORIDE 1.5 G/1.77G
40 POWDER, FOR SOLUTION ORAL AS NEEDED
Status: DISCONTINUED | OUTPATIENT
Start: 2020-02-05 | End: 2020-02-07 | Stop reason: HOSPADM

## 2020-02-05 RX ORDER — ACETAMINOPHEN 325 MG/1
650 TABLET ORAL EVERY 6 HOURS PRN
Status: DISCONTINUED | OUTPATIENT
Start: 2020-02-05 | End: 2020-02-07 | Stop reason: HOSPADM

## 2020-02-05 RX ORDER — L.ACID,PARA/B.BIFIDUM/S.THERM 8B CELL
1 CAPSULE ORAL DAILY
Status: DISCONTINUED | OUTPATIENT
Start: 2020-02-06 | End: 2020-02-07 | Stop reason: HOSPADM

## 2020-02-05 RX ORDER — SODIUM CHLORIDE 0.9 % (FLUSH) 0.9 %
10 SYRINGE (ML) INJECTION AS NEEDED
Status: DISCONTINUED | OUTPATIENT
Start: 2020-02-05 | End: 2020-02-07 | Stop reason: HOSPADM

## 2020-02-05 RX ORDER — MAGNESIUM SULFATE HEPTAHYDRATE 40 MG/ML
4 INJECTION, SOLUTION INTRAVENOUS AS NEEDED
Status: DISCONTINUED | OUTPATIENT
Start: 2020-02-05 | End: 2020-02-07 | Stop reason: HOSPADM

## 2020-02-05 RX ORDER — POTASSIUM CHLORIDE 7.45 MG/ML
10 INJECTION INTRAVENOUS
Status: DISCONTINUED | OUTPATIENT
Start: 2020-02-05 | End: 2020-02-07 | Stop reason: HOSPADM

## 2020-02-05 RX ORDER — POTASSIUM CHLORIDE 750 MG/1
40 CAPSULE, EXTENDED RELEASE ORAL AS NEEDED
Status: DISCONTINUED | OUTPATIENT
Start: 2020-02-05 | End: 2020-02-07 | Stop reason: HOSPADM

## 2020-02-05 RX ORDER — ONDANSETRON 2 MG/ML
4 INJECTION INTRAMUSCULAR; INTRAVENOUS EVERY 6 HOURS PRN
Status: DISCONTINUED | OUTPATIENT
Start: 2020-02-05 | End: 2020-02-07 | Stop reason: HOSPADM

## 2020-02-05 RX ADMIN — CEFTRIAXONE 2 G: 2 INJECTION, POWDER, FOR SOLUTION INTRAMUSCULAR; INTRAVENOUS at 20:47

## 2020-02-05 RX ADMIN — MAGNESIUM SULFATE 2 G: 2 INJECTION INTRAVENOUS at 22:44

## 2020-02-05 RX ADMIN — APIXABAN 2.5 MG: 2.5 TABLET, FILM COATED ORAL at 22:43

## 2020-02-05 RX ADMIN — SODIUM CHLORIDE, PRESERVATIVE FREE 20 ML: 5 INJECTION INTRAVENOUS at 22:44

## 2020-02-05 RX ADMIN — SODIUM CHLORIDE, POTASSIUM CHLORIDE, SODIUM LACTATE AND CALCIUM CHLORIDE 100 ML/HR: 600; 310; 30; 20 INJECTION, SOLUTION INTRAVENOUS at 22:44

## 2020-02-05 RX ADMIN — SODIUM CHLORIDE 500 ML: 9 INJECTION, SOLUTION INTRAVENOUS at 20:49

## 2020-02-05 RX ADMIN — ATORVASTATIN CALCIUM 80 MG: 40 TABLET, FILM COATED ORAL at 22:43

## 2020-02-06 ENCOUNTER — APPOINTMENT (OUTPATIENT)
Dept: GENERAL RADIOLOGY | Facility: HOSPITAL | Age: 85
End: 2020-02-06

## 2020-02-06 PROBLEM — E83.42 HYPOMAGNESEMIA: Status: ACTIVE | Noted: 2020-02-06

## 2020-02-06 LAB
ANION GAP SERPL CALCULATED.3IONS-SCNC: 11 MMOL/L (ref 5–15)
BUN BLD-MCNC: 19 MG/DL (ref 8–23)
BUN/CREAT SERPL: 20.2 (ref 7–25)
CALCIUM SPEC-SCNC: 8.4 MG/DL (ref 8.2–9.6)
CHLORIDE SERPL-SCNC: 102 MMOL/L (ref 98–107)
CO2 SERPL-SCNC: 25 MMOL/L (ref 22–29)
CREAT BLD-MCNC: 0.94 MG/DL (ref 0.76–1.27)
D-LACTATE SERPL-SCNC: 0.9 MMOL/L (ref 0.5–2)
DEPRECATED RDW RBC AUTO: 51.8 FL (ref 37–54)
ERYTHROCYTE [DISTWIDTH] IN BLOOD BY AUTOMATED COUNT: 14.1 % (ref 12.3–15.4)
GFR SERPL CREATININE-BSD FRML MDRD: 75 ML/MIN/1.73
GLUCOSE BLD-MCNC: 98 MG/DL (ref 65–99)
HCT VFR BLD AUTO: 32.1 % (ref 37.5–51)
HGB BLD-MCNC: 10.6 G/DL (ref 13–17.7)
MAGNESIUM SERPL-MCNC: 2.2 MG/DL (ref 1.7–2.3)
MCH RBC QN AUTO: 33.3 PG (ref 26.6–33)
MCHC RBC AUTO-ENTMCNC: 33 G/DL (ref 31.5–35.7)
MCV RBC AUTO: 100.9 FL (ref 79–97)
PLATELET # BLD AUTO: 143 10*3/MM3 (ref 140–450)
PMV BLD AUTO: 10.8 FL (ref 6–12)
POTASSIUM BLD-SCNC: 4.1 MMOL/L (ref 3.5–5.2)
RBC # BLD AUTO: 3.18 10*6/MM3 (ref 4.14–5.8)
SODIUM BLD-SCNC: 138 MMOL/L (ref 136–145)
WBC NRBC COR # BLD: 19.59 10*3/MM3 (ref 3.4–10.8)

## 2020-02-06 PROCEDURE — 97530 THERAPEUTIC ACTIVITIES: CPT

## 2020-02-06 PROCEDURE — 85027 COMPLETE CBC AUTOMATED: CPT | Performed by: HOSPITALIST

## 2020-02-06 PROCEDURE — 96361 HYDRATE IV INFUSION ADD-ON: CPT

## 2020-02-06 PROCEDURE — 25010000002 MAGNESIUM SULFATE 2 GM/50ML SOLUTION: Performed by: HOSPITALIST

## 2020-02-06 PROCEDURE — 25010000002 CEFTRIAXONE PER 250 MG: Performed by: HOSPITALIST

## 2020-02-06 PROCEDURE — 97166 OT EVAL MOD COMPLEX 45 MIN: CPT

## 2020-02-06 PROCEDURE — G0378 HOSPITAL OBSERVATION PER HR: HCPCS

## 2020-02-06 PROCEDURE — 97162 PT EVAL MOD COMPLEX 30 MIN: CPT

## 2020-02-06 PROCEDURE — 74230 X-RAY XM SWLNG FUNCJ C+: CPT

## 2020-02-06 PROCEDURE — 83605 ASSAY OF LACTIC ACID: CPT | Performed by: HOSPITALIST

## 2020-02-06 PROCEDURE — 99225 PR SBSQ OBSERVATION CARE/DAY 25 MINUTES: CPT | Performed by: NURSE PRACTITIONER

## 2020-02-06 PROCEDURE — 80048 BASIC METABOLIC PNL TOTAL CA: CPT | Performed by: HOSPITALIST

## 2020-02-06 PROCEDURE — 92610 EVALUATE SWALLOWING FUNCTION: CPT

## 2020-02-06 PROCEDURE — 83735 ASSAY OF MAGNESIUM: CPT | Performed by: NURSE PRACTITIONER

## 2020-02-06 PROCEDURE — 51701 INSERT BLADDER CATHETER: CPT

## 2020-02-06 PROCEDURE — 92611 MOTION FLUOROSCOPY/SWALLOW: CPT

## 2020-02-06 PROCEDURE — 96366 THER/PROPH/DIAG IV INF ADDON: CPT

## 2020-02-06 RX ADMIN — CEFTRIAXONE 1 G: 1 INJECTION, POWDER, FOR SOLUTION INTRAMUSCULAR; INTRAVENOUS at 21:51

## 2020-02-06 RX ADMIN — ATORVASTATIN CALCIUM 80 MG: 40 TABLET, FILM COATED ORAL at 21:51

## 2020-02-06 RX ADMIN — Medication 1 CAPSULE: at 08:23

## 2020-02-06 RX ADMIN — ACETAMINOPHEN 650 MG: 325 TABLET ORAL at 15:50

## 2020-02-06 RX ADMIN — APIXABAN 2.5 MG: 2.5 TABLET, FILM COATED ORAL at 21:51

## 2020-02-06 RX ADMIN — PANTOPRAZOLE SODIUM 40 MG: 40 TABLET, DELAYED RELEASE ORAL at 06:43

## 2020-02-06 RX ADMIN — MAGNESIUM SULFATE 2 G: 2 INJECTION INTRAVENOUS at 02:26

## 2020-02-06 RX ADMIN — BARIUM SULFATE 20 ML: 400 PASTE ORAL at 13:48

## 2020-02-06 RX ADMIN — TAMSULOSIN HYDROCHLORIDE 0.4 MG: 0.4 CAPSULE ORAL at 08:23

## 2020-02-06 RX ADMIN — BARIUM SULFATE 55 ML: 0.81 POWDER, FOR SUSPENSION ORAL at 13:48

## 2020-02-06 RX ADMIN — BARIUM SULFATE 50 ML: 400 SUSPENSION ORAL at 13:48

## 2020-02-06 RX ADMIN — LIDOCAINE 1 PATCH: 50 PATCH TOPICAL at 21:51

## 2020-02-06 RX ADMIN — SODIUM CHLORIDE, PRESERVATIVE FREE 10 ML: 5 INJECTION INTRAVENOUS at 21:52

## 2020-02-06 RX ADMIN — APIXABAN 2.5 MG: 2.5 TABLET, FILM COATED ORAL at 08:23

## 2020-02-06 NOTE — DISCHARGE INSTR - DIET
MBS/VFSS  2/6/20  Reason for Referral  Patient was referred for a MBS to assess the efficiency of his/her swallow function, rule out aspiration and make recommendations regarding safe dietary consistencies, effective compensatory strategies, and safe eating environment.             Recommendations/Treatment  SLP Swallowing Diagnosis: mild-moderate, oral dysfunction, pharyngeal dysfunction  Functional Impact: risk of aspiration/pneumonia  Rehab Potential/Prognosis, Swallowing: good, to achieve stated therapy goals  Swallow Criteria for Skilled Therapeutic Interventions Met: demonstrates skilled criteria  Therapy Frequency (Swallow): 5 days per week  Predicted Duration Therapy Intervention (Days): until discharge  SLP Diet Recommendation: mechanical soft with no mixed consistencies, nectar thick liquids  Recommended Diagnostics: VFSS (MBS)  Recommended Precautions and Strategies: upright posture during/after eating, small bites of food and sips of liquid  SLP Rec. for Method of Medication Administration: meds whole, meds crushed, with pudding or applesauce  Monitor for Signs of Aspiration: yes, notify SLP if any concerns  Anticipated Dischage Disposition: unknown, anticipate therapy at next level of care    Instrumental Set-up       Oral Preparation/ Oral Phase  Oral Prep Phase: impaired oral phase of swallowing  Oral Transit Phase: impaired  Oral Residue: WFL  Oral Preparatory Phase: prolonged manipulation  Prolonged Manipulation: regular textures  Impaired Oral Transit Phase: increased A-P transit time, premature spillage of liquids into pharynx  Premature Spillage of Liquids into Pharynx: thin liquids       Pharyngeal Phase  Initiation of Pharyngeal Swallow: bolus in pyriform sinuses  Pharyngeal Phase: impaired pharyngeal phase of swallowing  Penetration Before the Swallow: thin liquids, secondary to delayed swallow initiation or mistiming, secondary to reduced back of tongue control  Aspiration During the Swallow:  thin liquids, secondary to delayed swallow initiation or mistiming, secondary to reduced vestibular closure  Aspiration After the Swallow: thin liquids, secondary to residue, in laryngeal vestibule  Response to Aspiration: inconsistent response, throat clear  Pharyngeal Residue: all consistencies tested, valleculae, secondary to reduced base of tongue retraction  Response to Residue: cleared residue with spontaneous subsequent swallow  Attempted Compensatory Maneuvers: bolus size, bolus presentation style  Response to Attempted Compensatory Maneuvers: did not prevent penetration, did not prevent aspiration  VFSS Summary: SLP MBS evaluation completed. Pt presents w/ mild-moderate oropharyngeal dysphagia. Prolonged mastication & incr'd A-P transit of regular solid. Pre-spill of liquids into the pharynx. Penetration before & aspiration during/after the swallow w/ thin liquid. Inconsistent throat clear response. No penetration/aspiration w/ nectar-thick liquid, pudding, or solid. Mild vallecular residue across consistencies that most cleared w/ subsequent swallows. Recommend dysphagia level IV diet w. NTL. Meds whole or crushed in pudding/puree. SLP will continue to follow for tx.    Cervical Esophageal Phase

## 2020-02-06 NOTE — PLAN OF CARE
Problem: Patient Care Overview  Goal: Plan of Care Review  Flowsheets (Taken 2/6/2020 1031)  Plan of Care Reviewed With: patient  Outcome Summary: PT eval completed. Pt presents with weakness, impaired balance, and difficulty walking per PLOF. Pt req modA for supine > sit EOB, and modA x1 for sit <> stand. Pt amb 60ft with RW with Carine x1 +1 for recliner in tow. Pt dem short, shuffling gait pattern, needing cues to widen CHANTE and increase step length. Pt needed 1 seated rest break. VSS upon return to room. PT recommends d/c back to SNF with PT services.

## 2020-02-06 NOTE — PLAN OF CARE
Problem: Patient Care Overview  Goal: Plan of Care Review  Outcome: Ongoing (interventions implemented as appropriate)  Flowsheets  Taken 2/5/2020 2300  Plan of Care Reviewed With: patient  Taken 2/6/2020 0503  Outcome Summary: Pt arrived to unit from the ER with a dx of septic UTI. Pt has remained stable throughout the night witth VSS. Pt has received 4gm IV mag and is receiving IV abx.  Pt is an in and out cath Q8H. Pt has remained afebrile since arrival to the floor. Will continue to monitor     Problem: Pain, Chronic (Adult)  Goal: Identify Related Risk Factors and Signs and Symptoms  Outcome: Ongoing (interventions implemented as appropriate)  Flowsheets (Taken 2/6/2020 0505)  Signs and Symptoms (Chronic Pain): verbalization of pain descriptors  Note:   Pt has pain in right scapula and shoulder, only noticed at night. Pt uses heating pad at [facility]

## 2020-02-06 NOTE — ED PROVIDER NOTES
Subjective   Mr. Tho Kohli is a 89 y.o male presenting to the emergency department with complaints of generalized weakness. He is a resident at [facility] and he self-catheterizes every 8 hours for the past couple of weeks. After changin his catheter today at 1500, he noticed he was unable to stand up from his wheelchair. He is usually able to walk around using his cane and is in physical therapy. His nurse practitioner found he had a fever and an infection via a positive dip stick and sent him here. He denies abnormal urine, shortness of breath, body aches, nausea, vomiting, diarrhea, congestion, and sore throat. His son spoke to Mr. Kohli today at 1630 and he tells us Mr. Kohli has intermittent confusion. He noted during their conversation that Mr. Kohli had slurred speech and his sentences were drifting off. He confirms a history of urinary tract infections. He presented to the hospital on 12/11/2019 for a urinary tract infection with Escherichia coli that was 100 percent susceptible to all antibiotics tested. There are no other acute symptoms at this time.      History provided by:  Patient and relative  Weakness - Generalized   Severity:  Moderate  Onset quality:  Sudden  Duration:  5 hours  Timing:  Constant  Progression:  Worsening  Chronicity:  New  Relieved by:  None tried  Worsened by:  Standing  Ineffective treatments:  None tried  Associated symptoms: difficulty walking and fever    Associated symptoms: no diarrhea, no nausea, no shortness of breath and no vomiting    Associated symptoms comment:  Difficulty standing      Review of Systems   Constitutional: Positive for fever.        No body aches   HENT: Negative for congestion and sore throat.    Respiratory: Negative for shortness of breath.    Gastrointestinal: Negative for diarrhea, nausea and vomiting.   Genitourinary:        Normal urine noted by patient   Neurological: Positive for speech difficulty (slurred; jumbled) and weakness.    Psychiatric/Behavioral: Positive for confusion.   All other systems reviewed and are negative.      Past Medical History:   Diagnosis Date   • OAB (overactive bladder)    • Stroke (CMS/HCC)        No Known Allergies    History reviewed. No pertinent surgical history.    History reviewed. No pertinent family history.    Social History     Socioeconomic History   • Marital status:      Spouse name: Not on file   • Number of children: Not on file   • Years of education: Not on file   • Highest education level: Not on file   Tobacco Use   • Smoking status: Never Smoker   • Smokeless tobacco: Never Used   Substance and Sexual Activity   • Drug use: Never   • Sexual activity: Defer         Objective   Physical Exam   Constitutional: He is oriented to person, place, and time. He appears well-developed and well-nourished. No distress.   He is a wandering historian.    HENT:   Head: Normocephalic and atraumatic.   Benign pharynx. Mouth is slightly dry   Eyes: No scleral icterus.   No icterus. Normal eyes.   Cardiovascular: Normal rate, regular rhythm and normal heart sounds.   No murmur heard.  Pulmonary/Chest: Effort normal. He has rales.   Very minor rales.   Abdominal: Soft. There is no tenderness.   Benign abdomen   Musculoskeletal: He exhibits edema. He exhibits no tenderness or deformity.   Trace pretibial edema   Lymphadenopathy:     He has no cervical adenopathy.   Neurological: He is alert and oriented to person, place, and time.   Skin: Skin is warm and dry. He is not diaphoretic.   Skin is hot   Psychiatric: He has a normal mood and affect. His behavior is normal.   Nursing note and vitals reviewed.      Critical Care  Performed by: Gonzalo Vyas MD  Authorized by: Gonzalo Vyas MD     Critical care provider statement:     Critical care time (minutes):  35    Critical care time was exclusive of:  Separately billable procedures and treating other patients    Critical care was necessary to treat or  prevent imminent or life-threatening deterioration of the following conditions:  Sepsis    Critical care was time spent personally by me on the following activities:  Discussions with consultants, development of treatment plan with patient or surrogate, examination of patient, review of old charts, ordering and review of laboratory studies, ordering and review of radiographic studies, ordering and performing treatments and interventions, re-evaluation of patient's condition, obtaining history from patient or surrogate, pulse oximetry and evaluation of patient's response to treatment  Comments:      Recognition of sepsis and initiation of antibiotics.  Several rechecks to find him hemodynamically stable.  Discussion with admitting physician.             ED Course  ED Course as of Feb 06 0207   Wed Feb 05, 2020 2042 I have interpreted his CXR as showing large hiatal hernia, no infiltrate, stable from last CXR.    [LI]   2043 Dr. Vyas paged Dr. Almonte, hospitalist, for admission.    [HV]   2043 Critical care secondary to recognition of sepsis and initiation of antibiotic treatment and IV fluids.  Patient hemodynamically stable.  Several rechecks to confirm.    [LI]   2043 Dr. Vyas returns to bedside to reevaluate and update the patient.    [HV]   2052 Dr. Vyas spoke to Dr. Almonte, hospitalist, who agrees with admission    [HV]      ED Course User Index  [HV] Faby Ruvalcaba  [LI] Gonzalo Vyas MD     Recent Results (from the past 24 hour(s))   Comprehensive Metabolic Panel    Collection Time: 02/05/20  8:07 PM   Result Value Ref Range    Glucose 126 (H) 65 - 99 mg/dL    BUN 25 (H) 8 - 23 mg/dL    Creatinine 1.06 0.76 - 1.27 mg/dL    Sodium 141 136 - 145 mmol/L    Potassium 3.8 3.5 - 5.2 mmol/L    Chloride 105 98 - 107 mmol/L    CO2 25.0 22.0 - 29.0 mmol/L    Calcium 9.2 8.2 - 9.6 mg/dL    Total Protein 7.1 6.0 - 8.5 g/dL    Albumin 3.90 3.50 - 5.20 g/dL    ALT (SGPT) 27 1 - 41 U/L    AST (SGOT) 32 1 - 40  U/L    Alkaline Phosphatase 123 (H) 39 - 117 U/L    Total Bilirubin 0.8 0.2 - 1.2 mg/dL    eGFR Non African Amer 65 >60 mL/min/1.73    Globulin 3.2 gm/dL    A/G Ratio 1.2 g/dL    BUN/Creatinine Ratio 23.6 7.0 - 25.0    Anion Gap 11.0 5.0 - 15.0 mmol/L   Troponin    Collection Time: 02/05/20  8:07 PM   Result Value Ref Range    Troponin T 0.021 0.000 - 0.030 ng/mL   Magnesium    Collection Time: 02/05/20  8:07 PM   Result Value Ref Range    Magnesium 1.4 (L) 1.7 - 2.3 mg/dL   Urinalysis With Microscopic If Indicated (No Culture) - Urine, Catheter    Collection Time: 02/05/20  8:07 PM   Result Value Ref Range    Color, UA Yellow Yellow, Straw    Appearance, UA Cloudy (A) Clear    pH, UA 5.5 5.0 - 8.0    Specific Gravity, UA 1.019 1.001 - 1.030    Glucose, UA Negative Negative    Ketones, UA Negative Negative    Bilirubin, UA Negative Negative    Blood, UA Moderate (2+) (A) Negative    Protein, UA 30 mg/dL (1+) (A) Negative    Leuk Esterase, UA Large (3+) (A) Negative    Nitrite, UA Positive (A) Negative    Urobilinogen, UA 1.0 E.U./dL 0.2 - 1.0 E.U./dL   Light Blue Top    Collection Time: 02/05/20  8:07 PM   Result Value Ref Range    Extra Tube hold for add-on    Green Top (Gel)    Collection Time: 02/05/20  8:07 PM   Result Value Ref Range    Extra Tube Hold for add-ons.    Lavender Top    Collection Time: 02/05/20  8:07 PM   Result Value Ref Range    Extra Tube hold for add-on    Gold Top - SST    Collection Time: 02/05/20  8:07 PM   Result Value Ref Range    Extra Tube Hold for add-ons.    CBC Auto Differential    Collection Time: 02/05/20  8:07 PM   Result Value Ref Range    WBC 18.39 (H) 3.40 - 10.80 10*3/mm3    RBC 3.61 (L) 4.14 - 5.80 10*6/mm3    Hemoglobin 12.0 (L) 13.0 - 17.7 g/dL    Hematocrit 35.7 (L) 37.5 - 51.0 %    MCV 98.9 (H) 79.0 - 97.0 fL    MCH 33.2 (H) 26.6 - 33.0 pg    MCHC 33.6 31.5 - 35.7 g/dL    RDW 14.0 12.3 - 15.4 %    RDW-SD 51.2 37.0 - 54.0 fl    MPV 10.6 6.0 - 12.0 fL    Platelets 176 140  - 450 10*3/mm3    Neutrophil % 87.0 (H) 42.7 - 76.0 %    Lymphocyte % 3.4 (L) 19.6 - 45.3 %    Monocyte % 8.8 5.0 - 12.0 %    Eosinophil % 0.1 (L) 0.3 - 6.2 %    Basophil % 0.2 0.0 - 1.5 %    Immature Grans % 0.5 0.0 - 0.5 %    Neutrophils, Absolute 16.02 (H) 1.70 - 7.00 10*3/mm3    Lymphocytes, Absolute 0.62 (L) 0.70 - 3.10 10*3/mm3    Monocytes, Absolute 1.61 (H) 0.10 - 0.90 10*3/mm3    Eosinophils, Absolute 0.01 0.00 - 0.40 10*3/mm3    Basophils, Absolute 0.04 0.00 - 0.20 10*3/mm3    Immature Grans, Absolute 0.09 (H) 0.00 - 0.05 10*3/mm3    nRBC 0.0 0.0 - 0.2 /100 WBC   Urinalysis, Microscopic Only - Urine, Catheter    Collection Time: 02/05/20  8:07 PM   Result Value Ref Range    RBC, UA 0-2 None Seen, 0-2 /HPF    WBC, UA Too Numerous to Count (A) None Seen, 0-2 /HPF    Bacteria, UA 3+ (A) None Seen, Trace /HPF    Squamous Epithelial Cells, UA None Seen None Seen, 0-2 /HPF    Hyaline Casts, UA None Seen 0 - 6 /LPF    Methodology Automated Microscopy      Note: In addition to lab results from this visit, the labs listed above may include labs taken at another facility or during a different encounter within the last 24 hours. Please correlate lab times with ED admission and discharge times for further clarification of the services performed during this visit.    XR Chest 1 View   Final Result      1. Redemonstration of a large hiatal hernia, unchanged.   2. Redemonstration of cardiac silhouette enlargement and distortion of parenchymal architecture consistent with chronic lung disease. No active disease. No change from prior.      Signer Name: Shoshana Diamond MD    Signed: 2/5/2020 8:53 PM    Workstation Name: [email or web address]     [facility]        Vitals:    02/05/20 2159 02/05/20 2200 02/05/20 2215 02/05/20 2226   BP:  146/81  164/99   BP Location:    Right arm   Patient Position:    Lying   Pulse: 82 84  78   Resp:    16   Temp:    98.7 °F (37.1 °C)   TempSrc:    Oral   SpO2: 94%   96%   Weight:    68.9 kg (152 lb) 72.8 kg (160 lb 6.4 oz)   Height:         Medications   sodium chloride 0.9 % flush 10 mL (has no administration in time range)   acetaminophen (TYLENOL) tablet 650 mg (has no administration in time range)   apixaban (ELIQUIS) tablet 2.5 mg (2.5 mg Oral Given 2/5/20 2243)   atorvastatin (LIPITOR) tablet 80 mg (80 mg Oral Given 2/5/20 2243)   pantoprazole (PROTONIX) EC tablet 40 mg (has no administration in time range)   lactobacillus acidophilus (RISAQUAD) capsule 1 capsule (has no administration in time range)   tamsulosin (FLOMAX) 24 hr capsule 0.4 mg (has no administration in time range)   sodium chloride 0.9 % flush 10 mL (20 mL Intravenous Given 2/5/20 2244)   sodium chloride 0.9 % flush 10 mL (has no administration in time range)   lactated ringers infusion (100 mL/hr Intravenous New Bag 2/5/20 2244)   ondansetron (ZOFRAN) tablet 4 mg (has no administration in time range)     Or   ondansetron (ZOFRAN) injection 4 mg (has no administration in time range)   cefTRIAXone (ROCEPHIN) 1 g/100 mL 0.9% NS (MBP) (has no administration in time range)   Magnesium Sulfate 2 gram Bolus, followed by 8 gram infusion (total Mg dose 10 grams)- Mg less than or equal to 1mg/dL ( Intravenous Not Given:  See Alt 2/5/20 2244)     Or   Magnesium Sulfate 2 gram / 50mL Infusion (GIVE X 3 BAGS TO EQUAL 6GM TOTAL DOSE) - Mg 1.1 - 1.5 mg/dl (2 g Intravenous New Bag 2/5/20 2244)     Or   Magnesium Sulfate 4 gram infusion- Mg 1.6-1.9 mg/dL ( Intravenous Not Given:  See Alt 2/5/20 2244)   potassium chloride (MICRO-K) CR capsule 40 mEq (has no administration in time range)     Or   potassium chloride (KLOR-CON) packet 40 mEq (has no administration in time range)     Or   potassium chloride 10 mEq in 100 mL IVPB (has no administration in time range)   lidocaine (LIDODERM) 5 % 1 patch (has no administration in time range)   cefTRIAXone (ROCEPHIN) 2 g/100 mL 0.9% NS VTB (MERISSA) (0 g Intravenous Stopped 2/5/20 4614)   sodium  chloride 0.9 % bolus 500 mL (0 mL Intravenous Stopped 2/5/20 2135)     ECG/EMG Results (last 24 hours)     Procedure Component Value Units Date/Time    ECG 12 Lead [[id number]] Collected:  02/05/20 1954     Updated:  02/05/20 2034    Narrative:       Test Reason : Weak/Dizzy/AMS protocol  Blood Pressure : **/** mmHG  Vent. Rate : 086 BPM     Atrial Rate : 086 BPM     P-R Int : 148 ms          QRS Dur : 124 ms      QT Int : 400 ms       P-R-T Axes : -24 -31 -34 degrees     QTc Int : 478 ms      Normal sinus rhythm  Left axis deviation  Right bundle branch block  Left ventricular hypertrophy with QRS widening  Abnormal ECG  When compared with ECG of 09-DEC-2019 14:15,  T wave inversion now evident in Inferior leads  T wave inversion more evident in Anterior leads  Confirmed by ROMEO SAUCDEA MD ([id number]) on 2/5/2020 8:34:26 PM    Referred By:  KIMBERLEE JAY           Confirmed By:ROMEO SAUCEDA MD        ECG 12 Lead   Final Result   Test Reason : Weak/Dizzy/AMS protocol   Blood Pressure : **/** mmHG   Vent. Rate : 086 BPM     Atrial Rate : 086 BPM      P-R Int : 148 ms          QRS Dur : 124 ms       QT Int : 400 ms       P-R-T Axes : -24 -31 -34 degrees      QTc Int : 478 ms        Normal sinus rhythm   Left axis deviation   Right bundle branch block   Left ventricular hypertrophy with QRS widening   Abnormal ECG   When compared with ECG of 09-DEC-2019 14:15,   T wave inversion now evident in Inferior leads   T wave inversion more evident in Anterior leads   Confirmed by ROMEO SAUCEDA MD ([id number]) on 2/5/2020 8:34:26 PM      Referred By:  KIMBERLEE JAY           Confirmed By:ROMEO SAUCEDA MD                                                [place]    Final diagnoses:   Sepsis, due to unspecified organism, unspecified whether acute organ dysfunction present (CMS/HCC)   Urinary tract infection without hematuria, site unspecified   Weakness       Documentation assistance provided by abril Jones.  Information recorded by the scribe was done  at my direction and has been verified and validated by me.     Faby Ruvalcaba  02/05/20 4616       Gonzalo Vyas MD  02/06/20 5057

## 2020-02-06 NOTE — PLAN OF CARE
Problem: Patient Care Overview  Goal: Plan of Care Review  2/6/2020 1422 by Stephan Michelle MS CCC-SLP  Outcome: Ongoing (interventions implemented as appropriate)  Flowsheets (Taken 2/6/2020 1422)  Plan of Care Reviewed With: patient  Note:   SLP MBS evaluation completed. Will address dysphagia. Please see note for further details and recommendations.

## 2020-02-06 NOTE — PROGRESS NOTES
Discharge Planning Assessment  [facility]     Patient Name: Tho Kohli  MRN: [id number]  Today's Date: 2/6/2020    Admit Date: 2/5/2020    Discharge Needs Assessment     Row Name 02/06/20 1016       Living Environment    Lives With  facility resident    Current Living Arrangements  home/apartment/condo    Living Arrangement Comments  The pt is a resident of [facility]. He is in a personal care bed. States the staff assist him as needed.       Discharge Needs Assessment    Equipment Currently Used at Home  cane, straight;rollator    Equipment Needed After Discharge  none    Discharge Coordination/Progress  States he uses a cane and walker at the facility. Gets PT 3 times per week from the facility staff.        Discharge Plan     Row Name 02/06/20 1020       Plan    Plan  [facility]    Patient/Family in Agreement with Plan  yes    Plan Comments  I spoke with the pt. His goal is to return to [facility] [place]. I have spoken with Lakisha in admissions who states the pt can return if he still meets the Personal Care criteria. I have [facility] transport for 2-7-2020 at 1330 if ready to return to the facility.    Final Discharge Disposition Code  04 - intermediate care facility    Row Name 02/06/20 0852       Plan    Final Discharge Disposition Code  01 - home or self-care;03 - skilled nursing facility (SNF)        Destination      Coordination has not been started for this encounter.      Durable Medical Equipment      Coordination has not been started for this encounter.      Dialysis/Infusion      Coordination has not been started for this encounter.      Home Medical Care      Coordination has not been started for this encounter.      Therapy      Coordination has not been started for this encounter.      Community Resources      Coordination has not been started for this encounter.        Expected Discharge Date and Time     Expected Discharge Date Expected  Discharge Time    Feb 7, 2020         Demographic Summary    No documentation.       Functional Status     Row Name 02/06/20 1016       Functional Status    Usual Activity Tolerance  moderate       Functional Status, IADL    Medications  assistive person    Meal Preparation  completely dependent    Housekeeping  completely dependent    Laundry  completely dependent    Shopping  completely dependent        Psychosocial    No documentation.       Abuse/Neglect    No documentation.       Legal    No documentation.       Substance Abuse    No documentation.       Patient Forms    No documentation.           Anne Edgar RN

## 2020-02-06 NOTE — THERAPY EVALUATION
Acute Care - Speech Language Pathology   Swallow Initial Evaluation [facility]   Clinical Swallow Evaluation     Patient Name: Tho Kohli  : 10/8/1927  MRN: [id number]  Today's Date: 2020               Admit Date: 2020    Visit Dx:     ICD-10-CM ICD-9-CM   1. Sepsis, due to unspecified organism, unspecified whether acute organ dysfunction present (CMS/[facility]) A41.9 038.9     995.91   2. Urinary tract infection without hematuria, site unspecified N39.0 599.0   3. Weakness R53.1 780.79     Patient Active Problem List   Diagnosis   • Stroke (CMS/[facility])   • Essential hypertension   • Dysarthria   • Grade III diastolic dysfunction   • Elevated troponin   • Abnormal EKG   • Sepsis (CMS/[facility])   • Acute UTI (urinary tract infection)     Past Medical History:   Diagnosis Date   • OAB (overactive bladder)    • Stroke (CMS/[facility])      History reviewed. No pertinent surgical history.     SWALLOW EVALUATION (last 72 hours)      SLP Adult Swallow Evaluation     Row Name 20 0915                   Rehab Evaluation    Document Type  evaluation  -MP        Subjective Information  no complaints  -MP        Patient Observations  alert;cooperative  -MP        Patient/Family Observations  No family present  -MP        Patient Effort  good  -MP           General Information    Patient Profile Reviewed  yes  -MP        Pertinent History Of Current Problem  Adm  from the [place] w/ sepsis. Hx CVA in 2019. Went to [facility] for rehab, then d/c to [place]. Had [facility]  w/ recs for mechanical soft, no mixed consistencies, & nectar-thick liquid. Pt reported had swallow study @ [facility] and returned to thin liquid.  -MP        Current Method of Nutrition  regular textures;thin liquids  -MP        Precautions/Limitations, Vision  WFL;for purposes of eval  -MP        Precautions/Limitations, Hearing  WFL;for purposes of eval  -MP        Prior Level of Function-Communication  WFL  -MP        Prior Level of Function-Swallowing   other (see comments) [facility] 12/13 w/ recs for lvl IV & NTL  -MP        Plans/Goals Discussed with  patient;agreed upon  -MP        Barriers to Rehab  none identified  -MP        Patient's Goals for Discharge  patient did not state  -MP           Pain Assessment    Additional Documentation  Pain Scale: FACES Pre/Post-Treatment (Group)  -MP           Pain Scale: FACES Pre/Post-Treatment    Pain: FACES Scale, Pretreatment  0-->no hurt  -MP        Pain: FACES Scale, Post-Treatment  0-->no hurt  -MP           Oral Motor and Function    Dentition Assessment  natural, present and adequate  -MP        Secretion Management  WNL/WFL  -MP        Mucosal Quality  moist, healthy  -MP           Oral Musculature and Cranial Nerve Assessment    Oral Motor General Assessment  generalized oral motor weakness  -MP           General Eating/Swallowing Observations    Respiratory Support Currently in Use  room air  -MP        Eating/Swallowing Skills  self-fed;fed by SLP  -MP        Positioning During Eating  upright in bed;other (see comments) torso upright, legs turned to the side  -MP        Utensils Used  spoon;cup;straw  -MP        Consistencies Trialed  thin liquids;pureed;regular textures  -MP           Clinical Swallow Eval    Oral Prep Phase  WFL  -MP        Oral Transit  WFL  -MP        Oral Residue  WFL  -MP        Pharyngeal Phase  suspected pharyngeal impairment  -MP        Clinical Swallow Evaluation Summary  Clinical swallow evaluation completed. Pt given trials of thin via tsp/cup/straw, puree, and regular solid. Oral phase grossly WFL. Throat clear following consecutive straw sips thin liquid. Discussed w/ pt completing MBS today to further assess, given s/sxs and hx dysphagia. Pt in agreement. Continue physician-ordered diet until MBS w/ general aspiration precautions, small bites/sips.  -MP           Clinical Impression    SLP Swallowing Diagnosis  functional oral phase;suspected pharyngeal dysfunction  -MP         Functional Impact  risk of aspiration/pneumonia  -MP        Rehab Potential/Prognosis, Swallowing  good, to achieve stated therapy goals  -MP        Swallow Criteria for Skilled Therapeutic Interventions Met  demonstrates skilled criteria  -MP           Recommendations    SLP Diet Recommendation  other (see comments) continue MD-ordered diet until MBS  -MP        Recommended Diagnostics  VFSS (MBS)  -MP        Recommended Precautions and Strategies  upright posture during/after eating;small bites of food and sips of liquid  -MP        SLP Rec. for Method of Medication Administration  meds whole;meds crushed;with pudding or applesauce  -MP        Monitor for Signs of Aspiration  yes;notify SLP if any concerns  -MP        Anticipated Dischage Disposition  unknown;anticipate therapy at next level of care  -MP          User Key  (r) = Recorded By, (t) = Taken By, (c) = Cosigned By    Initials Name Effective Dates    Stephan Boucher MS CCC-SLP 06/19/19 -           EDUCATION  The patient has been educated in the following areas:   Dysphagia (Swallowing Impairment).    SLP Recommendation and Plan  SLP Swallowing Diagnosis: functional oral phase, suspected pharyngeal dysfunction  SLP Diet Recommendation: other (see comments)(continue MD-ordered diet until [facility])  Recommended Precautions and Strategies: upright posture during/after eating, small bites of food and sips of liquid  SLP Rec. for Method of Medication Administration: meds whole, meds crushed, with pudding or applesauce     Monitor for Signs of Aspiration: yes, notify SLP if any concerns  Recommended Diagnostics: VFSS (MBS)  Swallow Criteria for Skilled Therapeutic Interventions Met: demonstrates skilled criteria  Anticipated Dischage Disposition: unknown, anticipate therapy at next level of care  Rehab Potential/Prognosis, Swallowing: good, to achieve stated therapy goals             Plan of Care Reviewed With: patient           Time Calculation:   Time  Calculation- SLP     Row Name 02/06/20 0940             Time Calculation- SLP    SLP Start Time  0915  -MP      SLP Received On  02/06/20  -MP        User Key  (r) = Recorded By, (t) = Taken By, (c) = Cosigned By    Initials Name Provider Type    Setphan Boucher MS CCC-SLP Speech and Language Pathologist          Therapy Charges for Today     Code Description Service Date Service Provider Modifiers Qty    91107587263 HC ST EVAL ORAL PHARYNG SWALLOW 3 2/6/2020 Stephan Michelle MS CCC-SLP GN 1               Stephan Michelle MS CCC-RAKESH  2/6/2020

## 2020-02-06 NOTE — PLAN OF CARE
Problem: Patient Care Overview  Goal: Plan of Care Review  Outcome: Ongoing (interventions implemented as appropriate)  Flowsheets (Taken 2/6/2020 7208)  Plan of Care Reviewed With: patient  Note:   SLP evaluation completed. Will plan for MBS to further assess swallow function . Please see note for further details and recommendations.

## 2020-02-06 NOTE — THERAPY EVALUATION
Patient Name: Tho Kohli  : 10/8/1927    MRN: [id number]                              Today's Date: 2020       Admit Date: 2020    Visit Dx:     ICD-10-CM ICD-9-CM   1. Sepsis, due to unspecified organism, unspecified whether acute organ dysfunction present (CMS/[facility]) A41.9 038.9     995.91   2. Urinary tract infection without hematuria, site unspecified N39.0 599.0   3. Weakness R53.1 780.79   4. Impaired functional mobility, balance, gait, and endurance Z74.09 V49.89     Patient Active Problem List   Diagnosis   • Stroke (CMS/[facility])   • Essential hypertension   • Dysarthria   • Grade III diastolic dysfunction   • Elevated troponin   • Abnormal EKG   • Sepsis (CMS/[facility])   • Acute UTI (urinary tract infection)   • Hypomagnesemia     Past Medical History:   Diagnosis Date   • OAB (overactive bladder)    • Stroke (CMS/[facility])      History reviewed. No pertinent surgical history.  General Information     [facility] Name 20          PT Evaluation Time/Intention    Document Type  evaluation  -     Mode of Treatment  individual therapy  -[facility] Name 20          General Information    Patient Profile Reviewed?  yes  -     Prior Level of Function  independent:;gait;transfer;bed mobility  -     Existing Precautions/Restrictions  fall  -     Barriers to Rehab  previous functional deficit  -[facility] Name 20          Relationship/Environment    Lives With  facility resident  -[facility] Name 20          Resource/Environmental Concerns    Current Living Arrangements  extended care facility  -[facility] Name 20 09          Home Main Entrance    Number of Stairs, Main Entrance  none  -[facility] Name 20 09          Stairs Within Home, Primary    Number of Stairs, Within Home, Primary  none  -[facility] Name 20 09          Cognitive Assessment/Intervention- PT/OT    Orientation Status (Cognition)  oriented x 4  -[facility] Name 20           Safety Issues, Functional Mobility    Safety Issues Affecting Function (Mobility)  positioning of assistive device;sequencing abilities  -SJ     Impairments Affecting Function (Mobility)  balance;postural/trunk control;range of motion (ROM);strength;endurance/activity tolerance  -SJ       User Key  (r) = Recorded By, (t) = Taken By, (c) = Cosigned By    Initials Name Provider Type    SJ Haydee Cardona, PT Physical Therapist        Mobility     Row Name 02/06/20 1027          Bed Mobility Assessment/Treatment    Bed Mobility Assessment/Treatment  supine-sit  -SJ     Supine-Sit [place] (Bed Mobility)  moderate assist (50% patient effort);1 person assist  -SJ     Assistive Device (Bed Mobility)  bed rails;draw sheet;head of bed elevated  -SJ     Comment (Bed Mobility)  extra time and effort needed  -SJ     Row Name 02/06/20 1027          Transfer Assessment/Treatment    Comment (Transfers)  cues for hand placement and sequencing  -SJ     Row Name 02/06/20 1027          Sit-Stand Transfer    Sit-Stand [place] (Transfers)  moderate assist (50% patient effort);1 person assist  -SJ     Assistive Device (Sit-Stand Transfers)  walker, front-wheeled  -SJ     Row Name 02/06/20 1027          Gait/Stairs Assessment/Training    Gait/Stairs Assessment/Training  distance ambulated  -SJ     [place] Level (Gait)  1 person assist;minimum assist (75% patient effort);1 person to manage equipment  -SJ     Assistive Device (Gait)  walker, front-wheeled  -SJ     Distance in Feet (Gait)  60  -SJ     Pattern (Gait)  step-to;step-through  -SJ     Deviations/Abnormal Patterns (Gait)  bilateral deviations;gait speed decreased;festinating/shuffling;nori decreased;stride length decreased;base of support, narrow  -SJ     Bilateral Gait Deviations  forward flexed posture;heel strike decreased  -SJ     Comment (Gait/Stairs)  pt amb with short, shuffling gait pattern, needing cues to widen CHANTE and increase step length. Pt  needed 1 seated rest break. Recliner in tow.  -SJ       User Key  (r) = Recorded By, (t) = Taken By, (c) = Cosigned By    Initials Name Provider Type    Haydee Allen PT Physical Therapist        Obj/Interventions     Row Name 02/06/20 1030          General ROM    GENERAL ROM COMMENTS  BLE's WFL  -SJ     Row Name 02/06/20 1030          MMT (Manual Muscle Testing)    General MMT Comments  BLE's 4-/5  -SJ     Row Name 02/06/20 1030          Static Sitting Balance    Level of [place] (Unsupported Sitting, Static Balance)  supervision  -SJ     Sitting Position (Unsupported Sitting, Static Balance)  sitting on edge of bed  -SJ     Row Name 02/06/20 1030          Dynamic Sitting Balance    Level of [place], Reaches Outside Midline (Sitting, Dynamic Balance)  contact guard assist  -SJ     Sitting Position, Reaches Outside Midline (Sitting, Dynamic Balance)  sitting on edge of bed  -SJ     Row Name 02/06/20 1030          Static Standing Balance    Level of [place] (Supported Standing, Static Balance)  contact guard assist  -SJ     Assistive Device Utilized (Supported Standing, Static Balance)  walker, rolling  -SJ       User Key  (r) = Recorded By, (t) = Taken By, (c) = Cosigned By    Initials Name Provider Type    Haydee Allen PT Physical Therapist        Goals/Plan     Row Name 02/06/20 1033          Bed Mobility Goal 1 (PT)    Activity/Assistive Device (Bed Mobility Goal 1, PT)  rolling to left;rolling to right;sit to supine;supine to sit  -SJ     [place] Level/Cues Needed (Bed Mobility Goal 1, PT)  conditional independence  -SJ     Time Frame (Bed Mobility Goal 1, PT)  long term goal (LTG);2 weeks  -SJ     Row Name 02/06/20 1033          Transfer Goal 1 (PT)    Activity/Assistive Device (Transfer Goal 1, PT)  sit-to-stand/stand-to-sit;bed-to-chair/chair-to-bed  -SJ     [place] Level/Cues Needed (Transfer Goal 1, PT)  conditional independence  -SJ     Time Frame (Transfer Goal 1,  PT)  long term goal (LTG);2 weeks  -     Row Name 02/06/20 1033          Gait Training Goal 1 (PT)    Activity/Assistive Device (Gait Training Goal 1, PT)  improve balance and speed;forward stepping;walker, rolling  -SJ     [place] Level (Gait Training Goal 1, PT)  supervision required  -SJ     Distance (Gait Goal 1, PT)  200  -SJ     Time Frame (Gait Training Goal 1, PT)  long term goal (LTG);2 weeks  -     Row Name 02/06/20 1033          Patient Education Goal (PT)    Activity (Patient Education Goal, PT)  HEP  -SJ     [place]/Cues/Accuracy (Memory Goal 2, PT)  demonstrates adequately;verbalizes understanding  -SJ     Time Frame (Patient Education Goal, PT)  long term goal (LTG);2 weeks  -SJ       User Key  (r) = Recorded By, (t) = Taken By, (c) = Cosigned By    Initials Name Provider Type    Haydee Allen, PT Physical Therapist        Clinical Impression     Row Name 02/06/20 1031          Pain Assessment    Additional Documentation  Pain Scale: Numbers Pre/Post-Treatment (Group)  -     Row Name 02/06/20 1031          Pain Scale: Numbers Pre/Post-Treatment    Pain Scale: Numbers, Pretreatment  0/10 - no pain  -SJ     Pain Scale: Numbers, Post-Treatment  0/10 - no pain  -     Row Name 02/06/20 1031          Plan of Care Review    Plan of Care Reviewed With  patient  -SJ     Outcome Summary  PT eval completed. Pt presents with weakness, impaired balance, and difficulty walking per PLOF. Pt req modA for supine > sit EOB, and modA x1 for sit <> stand. Pt amb 60ft with RW with Carine x1 +1 for recliner in tow. Pt dem short, shuffling gait pattern, needing cues to widen CHANTE and increase step length. Pt needed 1 seated rest break. VSS upon return to room. PT recommends d/c back to SNF with PT services.  -     Row Name 02/06/20 1031          Physical Therapy Clinical Impression    Patient/Family Goals Statement (PT Clinical Impression)  walk as much as possible  -     Criteria for Skilled  Interventions Met (PT Clinical Impression)  yes;treatment indicated  -SJ     Rehab Potential (PT Clinical Summary)  good, to achieve stated therapy goals  -SJ     Predicted Duration of Therapy (PT)  2wks  -     Row Name 02/06/20 1031          Vital Signs    Pre Systolic BP Rehab  147  -SJ     Pre Treatment Diastolic BP  81  -SJ     Posttreatment Heart Rate (beats/min)  75  -SJ     Post SpO2 (%)  93  -SJ     O2 Delivery Post Treatment  room air  -     Row Name 02/06/20 1031          Positioning and Restraints    Pre-Treatment Position  in bed  -SJ     Post Treatment Position  chair  -SJ     In Chair  notified nsg;reclined;call light within reach;encouraged to call for assist;exit alarm on;heels elevated  -SJ       User Key  (r) = Recorded By, (t) = Taken By, (c) = Cosigned By    Initials Name Provider Type    Haydee Allen, PT Physical Therapist        Outcome Measures     Row Name 02/06/20 1034          How much help from another person do you currently need...    Turning from your back to your side while in flat bed without using bedrails?  3  -SJ     Moving from lying on back to sitting on the side of a flat bed without bedrails?  2  -SJ     Moving to and from a bed to a chair (including a wheelchair)?  3  -SJ     Standing up from a chair using your arms (e.g., wheelchair, bedside chair)?  3  -SJ     Climbing 3-5 steps with a railing?  2  -SJ     To walk in hospital room?  3  -SJ     AM-PAC 6 Clicks Score (PT)  16  -SJ     [facility] Name 02/06/20 1034          Functional Assessment    Outcome Measure Options  AM-PAC 6 Clicks Basic Mobility (PT)  -       User Key  (r) = Recorded By, (t) = Taken By, (c) = Cosigned By    Initials Name Provider Type    Haydee Allen PT Physical Therapist          PT Recommendation and Plan  Planned Therapy Interventions (PT Eval): balance training, bed mobility training, home exercise program, patient/family education, strengthening, stretching, transfer training,  postural re-education  Outcome Summary/Treatment Plan (PT)  Anticipated Discharge Disposition (PT): skilled nursing facility  Plan of Care Reviewed With: patient  Outcome Summary: PT eval completed. Pt presents with weakness, impaired balance, and difficulty walking per PLOF. Pt req modA for supine > sit EOB, and modA x1 for sit <> stand. Pt amb 60ft with RW with Carine x1 +1 for recliner in tow. Pt dem short, shuffling gait pattern, needing cues to widen CHANTE and increase step length. Pt needed 1 seated rest break. VSS upon return to room. PT recommends d/c back to SNF with PT services.     Time Calculation:   PT Charges     Row Name 02/06/20 1035             Time Calculation    Start Time  0905  -      PT Received On  02/06/20  -      PT Goal Re-Cert Due Date  02/16/20  -        User Key  (r) = Recorded By, (t) = Taken By, (c) = Cosigned By    Initials Name Provider Type     Haydee Cardona, PT Physical Therapist        Therapy Charges for Today     Code Description Service Date Service Provider Modifiers Qty    23929046485 HC PT EVAL MOD COMPLEXITY 4 2/6/2020 Haydee Cardona, PT GP 1    95763405500 HC PT THER SUPP EA 15 MIN 2/6/2020 Haydee Cardona, PT GP 2          PT G-Codes  Outcome Measure Options: AM-PAC 6 Clicks Basic Mobility (PT)  AM-PAC 6 Clicks Score (PT): 16    Haydee Cardona PT  2/6/2020

## 2020-02-06 NOTE — PLAN OF CARE
Problem: Patient Care Overview  Goal: Plan of Care Review  Flowsheets (Taken 2/6/2020 1520)  Outcome Summary: OT eval completed Pt demonstrates deficits in safety awareness, impulsive with sitting, requires max cues for safe seq of transfers, mod A x2 STS and functional mob min A x2 at FWW, mod A bed mob overall, dep socks; recom IPOT d/c SNF

## 2020-02-06 NOTE — MBS/VFSS/FEES
Acute Care - Speech Language Pathology   Swallow Initial Evaluation  [place]   Modified Barium Swallow Study (MBS)     Patient Name: Tho Kohli  : 10/8/1927  MRN: [id number]  Today's Date: 2020               Admit Date: 2020    Visit Dx:     ICD-10-CM ICD-9-CM   1. Sepsis, due to unspecified organism, unspecified whether acute organ dysfunction present (CMS/[facility]) A41.9 038.9     995.91   2. Urinary tract infection without hematuria, site unspecified N39.0 599.0   3. Weakness R53.1 780.79   4. Impaired functional mobility, balance, gait, and endurance Z74.09 V49.89     Patient Active Problem List   Diagnosis   • Stroke (CMS/[facility])   • Essential hypertension   • Dysarthria   • Grade III diastolic dysfunction   • Elevated troponin   • Abnormal EKG   • Sepsis (CMS/[facility])   • Acute UTI (urinary tract infection)   • Hypomagnesemia     Past Medical History:   Diagnosis Date   • OAB (overactive bladder)    • Stroke (CMS/[facility])      History reviewed. No pertinent surgical history.     SWALLOW EVALUATION (last 72 hours)      [facility] Adult Swallow Evaluation     Row Name 20 1330 20 0915                Rehab Evaluation    Document Type  evaluation  -MP  evaluation  -MP       Subjective Information  no complaints  -MP  no complaints  -MP       Patient Observations  alert;cooperative  -MP  alert;cooperative  -MP       Patient/Family Observations  No family present  -MP  No family present  -MP       Patient Effort  good  -MP  good  -MP          General Information    Patient Profile Reviewed  yes  -MP  yes  -MP       Pertinent History Of Current Problem  See AM eval  -MP  Adm  from the [facility] [place] w/ sepsis. Hx CVA in 2019. Went to [facility] for rehab, then d/c to [facility]. Had MBS  w/ recs for mechanical soft, no mixed consistencies, & nectar-thick liquid. Pt reported had swallow study @ [facility] and returned to thin liquid.  -MP       Current Method of Nutrition  --  regular textures;thin liquids  -MP        Precautions/Limitations, Vision  --  WFL;for purposes of eval  -MP       Precautions/Limitations, Hearing  --  WFL;for purposes of eval  -MP       Prior Level of Function-Communication  --  WFL  -MP       Prior Level of Function-Swallowing  --  other (see comments) [facility] [place]13 w/ recs for lvl IV & NTL  -MP       Plans/Goals Discussed with  --  patient;agreed upon  -MP       Barriers to Rehab  --  none identified  -MP       Patient's Goals for Discharge  --  patient did not state  -MP          Pain Assessment    Additional Documentation  Pain Scale: FACES Pre/Post-Treatment (Group)  -MP  Pain Scale: FACES Pre/Post-Treatment (Group)  -MP          Pain Scale: FACES Pre/Post-Treatment    Pain: FACES Scale, Pretreatment  0-->no hurt  -MP  0-->no hurt  -MP       Pain: FACES Scale, Post-Treatment  0-->no hurt  -MP  0-->no hurt  -MP          Oral Motor and Function    Dentition Assessment  --  natural, present and adequate  -MP       Secretion Management  --  WNL/WFL  -MP       Mucosal Quality  --  moist, healthy  -MP          Oral Musculature and Cranial Nerve Assessment    Oral Motor General Assessment  --  generalized oral motor weakness  -          General Eating/Swallowing Observations    Respiratory Support Currently in Use  --  room air  -MP       Eating/Swallowing Skills  --  self-fed;fed by SLP  -MP       Positioning During Eating  --  upright in bed;other (see comments) torso upright, legs turned to the side  -       Utensils Used  --  spoon;cup;straw  -MP       Consistencies Trialed  --  thin liquids;pureed;regular textures  -MP          Clinical Swallow Eval    Oral Prep Phase  --  WFL  -MP       Oral Transit  --  WFL  -MP       Oral Residue  --  WFL  -MP       Pharyngeal Phase  --  suspected pharyngeal impairment  -MP       Clinical Swallow Evaluation Summary  --  Clinical swallow evaluation completed. Pt given trials of thin via tsp/cup/straw, puree, and regular solid. Oral phase grossly WFL. Throat  clear following consecutive straw sips thin liquid. Discussed w/ pt completing MBS today to further assess, given s/sxs and hx dysphagia. Pt in agreement. Continue physician-ordered diet until MBS w/ general aspiration precautions, small bites/sips.  -MP          MBS/VFSS Interpretation    Oral Prep Phase  impaired oral phase of swallowing  -MP  --       Oral Transit Phase  impaired  -MP  --       Oral Residue  WFL  -MP  --       VFSS Summary  SLP MBS evaluation completed. Pt presents w/ mild-moderate oropharyngeal dysphagia. Prolonged mastication & incr'd A-P transit of regular solid. Pre-spill of liquids into the pharynx. Penetration before & aspiration during/after the swallow w/ thin liquid. Inconsistent throat clear response. No penetration/aspiration w/ nectar-thick liquid, pudding, or solid. Mild vallecular residue across consistencies that most cleared w/ subsequent swallows. Recommend dysphagia level IV diet w. NTL. Meds whole or crushed in pudding/puree. SLP will continue to follow for tx.  -MP  --          Oral Preparatory Phase    Oral Preparatory Phase  prolonged manipulation  -MP  --       Prolonged Manipulation  regular textures  -MP  --          Oral Transit Phase    Impaired Oral Transit Phase  increased A-P transit time;premature spillage of liquids into pharynx  -MP  --       Increased A-P Transit Time  regular textures  -MP  --       Premature Spillage of Liquids into Pharynx  thin liquids  -MP  --          Initiation of Pharyngeal Swallow    Initiation of Pharyngeal Swallow  bolus in pyriform sinuses  -MP  --       Pharyngeal Phase  impaired pharyngeal phase of swallowing  -MP  --       Penetration Before the Swallow  thin liquids;secondary to delayed swallow initiation or mistiming;secondary to reduced back of tongue control  -MP  --       Aspiration During the Swallow  thin liquids;secondary to delayed swallow initiation or mistiming;secondary to reduced vestibular closure  -MP  --        Aspiration After the Swallow  thin liquids;secondary to residue;in laryngeal vestibule  -MP  --       Response to Aspiration  inconsistent response;throat clear  -MP  --       Rosenbek's Scale  thin:;7--->level 7  -MP  --       Pharyngeal Residue  all consistencies tested;valleculae;secondary to reduced base of tongue retraction  -MP  --       Response to Residue  cleared residue with spontaneous subsequent swallow  -MP  --       Attempted Compensatory Maneuvers  bolus size;bolus presentation style  -MP  --       Response to Attempted Compensatory Maneuvers  did not prevent penetration;did not prevent aspiration  -MP  --          Clinical Impression    SLP Swallowing Diagnosis  mild-moderate;oral dysfunction;pharyngeal dysfunction  -MP  functional oral phase;suspected pharyngeal dysfunction  -MP       Functional Impact  risk of aspiration/pneumonia  -MP  risk of aspiration/pneumonia  -MP       Rehab Potential/Prognosis, Swallowing  good, to achieve stated therapy goals  -MP  good, to achieve stated therapy goals  -MP       Swallow Criteria for Skilled Therapeutic Interventions Met  demonstrates skilled criteria  -MP  demonstrates skilled criteria  -MP          Recommendations    Therapy Frequency (Swallow)  5 days per week  -MP  --       Predicted Duration Therapy Intervention (Days)  until discharge  -MP  --       SLP Diet Recommendation  mechanical soft with no mixed consistencies;nectar thick liquids  -MP  other (see comments) continue MD-ordered diet until MBS  -MP       Recommended Diagnostics  --  VFSS ([facility])  -MP       Recommended Precautions and Strategies  upright posture during/after eating;small bites of food and sips of liquid  -MP  upright posture during/after eating;small bites of food and sips of liquid  -MP       SLP Rec. for Method of Medication Administration  meds whole;meds crushed;with pudding or applesauce  -MP  meds whole;meds crushed;with pudding or applesauce  -MP       Monitor for Signs of  Aspiration  yes;notify SLP if any concerns  -MP  yes;notify SLP if any concerns  -MP       Anticipated Dischage Disposition  unknown;anticipate therapy at next level of care  -MP  unknown;anticipate therapy at next level of care  -MP         User Key  (r) = Recorded By, (t) = Taken By, (c) = Cosigned By    Initials Name Effective Dates    Stephan Boucher, MS CCC-SLP 06/19/19 -           EDUCATION  The patient has been educated in the following areas:   Dysphagia (Swallowing Impairment) Modified Diet Instruction.    SLP Recommendation and Plan  SLP Swallowing Diagnosis: mild-moderate, oral dysfunction, pharyngeal dysfunction  SLP Diet Recommendation: mechanical soft with no mixed consistencies, nectar thick liquids  Recommended Precautions and Strategies: upright posture during/after eating, small bites of food and sips of liquid  SLP Rec. for Method of Medication Administration: meds whole, meds crushed, with pudding or applesauce     Monitor for Signs of Aspiration: yes, notify SLP if any concerns  Recommended Diagnostics: VFSS (MBS)  Swallow Criteria for Skilled Therapeutic Interventions Met: demonstrates skilled criteria  Anticipated Dischage Disposition: unknown, anticipate therapy at next level of care  Rehab Potential/Prognosis, Swallowing: good, to achieve stated therapy goals  Therapy Frequency (Swallow): 5 days per week  Predicted Duration Therapy Intervention (Days): until discharge       Plan of Care Reviewed With: patient    SLP GOALS     Row Name 02/06/20 1330             Oral Nutrition/Hydration Goal 1 (SLP)    Oral Nutrition/Hydration Goal 1, SLP  LTG: Pt will return to regular diet, thin liquids w/ no overt s/sxs aspiration/distress w/ 100% acc and no cues  -MP      Time Frame (Oral Nutrition/Hydration Goal 1, SLP)  by discharge  -MP         Oral Nutrition/Hydration Goal 2 (SLP)    Oral Nutrition/Hydration Goal 2, SLP  Pt will tolerate soft solids & nectar-thick liquids w/ no overt s/sxs  aspiration/distress w/ 100% acc and no cues  -MP      Time Frame (Oral Nutrition/Hydration Goal 2, SLP)  short term goal (STG);by discharge  -MP         Oral Nutrition/Hydration Goal (SLP)    Oral Nutrition/Hydration Goal, SLP  Pt will tolerate therapeutic trials of thin H2O w/ no overt s/sxs aspiration/distress w/ 70% acc and no cues in order to determine readiness for repeat instrumental eval  -MP      Time Frame (Oral Nutrition/Hydration Goal, SLP)  short term goal (STG);by discharge  -MP         Lingual Strengthening Goal 1 (SLP)    Activity (Lingual Strengthening Goal 1, SLP)  increase lingual tone/sensation/control/coordination/movement;increase tongue back strength  -MP      Increase Lingual Tone/Sensation/Control/Coordination/Movement  lingual movement exercises;swallow trials  -MP      Increase Tongue Back Strength  swallow trials;lingual resistance exercises  -MP      [place]/Accuracy (Lingual Strengthening Goal 1, SLP)  with minimal cues (75-90% accuracy)  -MP      Time Frame (Lingual Strengthening Goal 1, SLP)  short term goal (STG);by discharge  -MP         Pharyngeal Strengthening Exercise Goal 1 (SLP)    Activity (Pharyngeal Strengthening Goal 1, SLP)  increase timing;increase closure at entrance to airway/closure of airway at glottis;increase tongue base retraction  -MP      Increase Timing  prepping - 3 second prep or suck swallow or 3-step swallow  -MP      Increase Closure at Entrance to Airway/Closure of Airway at Glottis  super-supraglottic swallow  -MP      Increase Tongue Base Retraction  williams  -MP      [place]/Accuracy (Pharyngeal Strengthening Goal 1, SLP)  with minimal cues (75-90% accuracy)  -MP      Time Frame (Pharyngeal Strengthening Goal 1, SLP)  short term goal (STG);by discharge  -MP         Swallow Management Recall Goal 1 (SLP)    Activity (Swallow Management Recall Goal 1, SLP)  recall of;safe diet/liquid level;safe diet level/texture  -MP      [place]/Accuracy  (Swallow Management Recall Goal 1, SLP)  with minimal cues (75-90% accuracy)  -MP      Time Frame (Swallow Management Recall Goal 1, SLP)  short term goal (STG);by discharge  -MP        User Key  (r) = Recorded By, (t) = Taken By, (c) = Cosigned By    Initials Name Provider Type    Stephan Boucher MS CCC-SLP Speech and Language Pathologist             Time Calculation:   Time Calculation- SLP     Row Name 02/06/20 1423 02/06/20 0940          Time Calculation- SLP    SLP Start Time  1330  -MP  0915  -MP     SLP Received On  02/06/20  -MP  02/06/20  -MP       User Key  (r) = Recorded By, (t) = Taken By, (c) = Cosigned By    Initials Name Provider Type    Stephan Boucher MS CCC-SLP Speech and Language Pathologist          Therapy Charges for Today     Code Description Service Date Service Provider Modifiers Qty    44348845797 HC ST EVAL ORAL PHARYNG SWALLOW 3 2/6/2020 Stephan Michelle MS CCC-SLP GN 1    28754498519 HC ST MOTION FLUORO EVAL SWALLOW 3 2/6/2020 Stephan Michelle MS CCC-SLP GN 1               MS CINTIA Vasquez  2/6/2020

## 2020-02-06 NOTE — H&P
[facility] Medicine Services  Clinical Decision Unit  HISTORY & PHYSICAL    Patient Name: Tho Kohli  : 10/8/1927  MRN: [id number]  Primary Care Physician: Daniel Adams MD  Date of admission: 2020  7:53 PM      Subjective   Subjective     Chief Complaint:  Fever    HPI:  Tho Kohli is a 89 y.o. male resident of the [place] at [facility] sent here with fever. 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 He had L weakness and dysarthria and was sent to [facility] for rehab, but ultimately placed at the [facility]. Today he felt weaker and the nurse practitioner there noticed a fever. He otherwise states that he feels fine. He denies feeling like he had a fever. No chills/sweats. No abdominal pain. No n/v. No diarrhea. No dyspnea. No cough. No congestion. Had BM today. No bleeding. Tolerating PO and good appetite.     Of note he self-catheterizes TID since his CVA due to urinary retention.    Review of Systems   Constitutional: Positive for activity change and fatigue.   HENT: Negative.    Respiratory: Negative.    Cardiovascular: Negative.    Gastrointestinal: Negative.    Endocrine: Negative.    Genitourinary: Positive for difficulty urinating.   Musculoskeletal: Negative.    Skin: Negative.    Neurological: Positive for weakness.   Hematological: Negative.    Psychiatric/Behavioral: Negative.         All other systems reviewed and negative    Personal History     Past Medical History:   Diagnosis Date   • OAB (overactive bladder)    • Stroke (CMS/HCC)        History reviewed. No pertinent surgical history.    Family History: family history is not on file. Otherwise pertinent FHx was reviewed and unremarkable.     Social History:  reports that he has never smoked. He has never used smokeless tobacco. He reports that he does not use drugs.  Social History     Social History Narrative   • Not on file        Medications:  Available home medication information reviewed.    (Not in a hospital admission)    No Known Allergies    Objective   Objective     Vital Signs:   Temp:  [101 °F (38.3 °C)] 101 °F (38.3 °C)  Heart Rate:  [76-85] 77  Resp:  [18] 18  BP: (129-169)/(78-99) 141/98        Physical Exam   NAD, alert and oriented x 3  OP clear, dry MM  PERRL, face symmetric, speech clear and fluent  Neck supple  No LAD  RRR  CTAB  +BS, ND, NT, soft  ROSE, but has some residual L weakness  1+  B LE edema, noted on his last admission as well  Normal affect  No obvious rashes    Results Reviewed:  BUN elevated to 25  UA markedly abnormal  WBC elevated to 18.3    Assessment/Plan   Assessment / Plan     Active Hospital Problems    Diagnosis POA   • **Sepsis (CMS/HCC) [A41.9] Yes   • Acute UTI (urinary tract infection) [N39.0] Unknown   • Grade III diastolic dysfunction [I51.9] Yes     1. Echo 12-10-19:  · Estimated EF appears to be in the range of 61 - 65%.  · Left ventricular diastolic dysfunction (grade II) consistent with pseudonormalization     • Dysarthria [R47.1] Yes   • Stroke (CMS/HCC) [I63.9] Yes   • Essential hypertension [I10] Yes       Plan:  89 year old with recent CVA and urinary retention with TID catheterization here with an UTI.    Sepsis/UTI  --Rocephin/IVF    Recent CVA  --presumed embolic  --continue apixaban/statin/rehab  --ask for speech eval, was on modified diet before    HTN  --hold home meds for sepsis    Urinary retention  --continue caths      CODE STATUS:    Code Status and Medical Interventions:   Ordered at: 02/05/20 4184     Limited Support to NOT Include:    Cardioversion/Defibrillation    Intubation     Level Of Support Discussed With:    Patient     Code Status:    No CPR     Medical Interventions (Level of Support Prior to Arrest):    Limited       Admission Status:   I believe this patient meets observation criteria.    Discharge Blueprint (criteria for discharge):   1. Await C&S for  appropriate de-escalation of antibiotics  2. Consider urology input  3. Back to SNF pending above    Electronically signed by Raffi Jiemnes MD, 02/05/20, 9:49 PM.

## 2020-02-06 NOTE — PROGRESS NOTES
[facility] Medicine Services  Clinical Decision Unit  PROGRESS NOTE    Patient Name: Candelaria Kohli  : 10/8/1927  MRN: [id number]    Admission Date and Time: 2020  7:53 PM  Primary Care Physician: Daniel Adams MD    Subjective   Subjective     CC:  F/U UTI    HPI:  Patient seen twice. First ambulating with PT. States he feels ok, tired and little weaker than usual, but otherwise no complaints. Denies n/v/d or f/c/s. Self caths and does not appreciate any unusual symptoms. No further fever overnight.     Review of Systems  Gen- No fevers, chills  CV- No chest pain, palpitations  Resp- No cough, dyspnea  GI- No N/V/D, abd pain        Objective   Objective     Vital Signs:   Temp:  [98.1 °F (36.7 °C)-101 °F (38.3 °C)] 98.3 °F (36.8 °C)  Heart Rate:  [73-85] 73  Resp:  [16-18] 16  BP: (129-169)/(78-99) 147/81  Total (NIH Stroke Scale): 0     Physical Exam:  Constitutional: No acute distress, awake, alert- ambulating with PT  HENT: NCAT, mucous membranes moist  Respiratory: Clear to auscultation bilaterally, respiratory effort normal   Cardiovascular: RRR, no murmurs, rubs, or gallops, palpable pedal pulses bilaterally  Gastrointestinal: Positive bowel sounds, soft, nontender, nondistended  Musculoskeletal: No bilateral ankle edema  Psychiatric: Appropriate affect, cooperative  Neurologic: Oriented x 3, strength symmetric in all extremities, Cranial Nerves grossly intact to confrontation, mild dysarthria  Skin: No rashes      Results Reviewed:  Results for CANDELARIA KOHLI (MRN [id number]) as of 2020 10:34   Ref. Range 2020 04:30   WBC Latest Ref Range: 3.40 - 10.80 10*3/mm3 19.59 (H)   RBC Latest Ref Range: 4.14 - 5.80 10*6/mm3 3.18 (L)   Hemoglobin Latest Ref Range: 13.0 - 17.7 g/dL 10.6 (L)   Hematocrit Latest Ref Range: 37.5 - 51.0 % 32.1 (L)   RDW Latest Ref Range: 12.3 - 15.4 % 14.1   MCV Latest Ref Range: 79.0 - 97.0 fL 100.9 (H)   MCH Latest Ref Range: 26.6 -  33.0 pg 33.3 (H)   MCHC Latest Ref Range: 31.5 - 35.7 g/dL 33.0   MPV Latest Ref Range: 6.0 - 12.0 fL 10.8   Platelets Latest Ref Range: 140 - 450 10*3/mm3 143   RDW-SD Latest Ref Range: 37.0 - 54.0 fl 51.8       Assessment/Plan   Assessment / Plan     Active Hospital Problems    Diagnosis  POA   • **Sepsis (CMS/HCC) [A41.9]  Yes   • Hypomagnesemia [E83.42]  Unknown   • Acute UTI (urinary tract infection) [N39.0]  Unknown   • Grade III diastolic dysfunction [I51.9]  Yes   • Dysarthria [R47.1]  Yes   • Stroke (CMS/HCC) [I63.9]  Yes   • Essential hypertension [I10]  Yes      Resolved Hospital Problems   No resolved problems to display.        Brief course to date:  Tho Kohli is a 89 y.o. male with recent CVA and subsequent urinary retention who performs TID self catheterizations presents with fever and weakness:     Plan:  Sepsis/UTI  --continue rocephin. Review of previous cx shows pansensitive ecoli  --dc IVFs, encourage PO intake  --monitor UO. Continue self cath per home schedule    Hypomagnesemia  --replace     Recent CVA  --presumed embolic  --continue apixaban/statin/rehab  --ask for speech eval, was on modified diet before  --Pt/ot recs SNF at dc     HTN  --hold home meds for sepsis      Discharge Blueprint (criteria for discharge):   1. Await C&S for appropriate de-escalation of antibiotics  2. Afebrile/ hemodynamically stable  3. WBC improving  4. At baseline mobility      Electronically signed by WILLA Grant, 02/06/20, 10:48 AM.

## 2020-02-06 NOTE — PLAN OF CARE
Problem: Patient Care Overview  Goal: Plan of Care Review  Flowsheets (Taken 2/6/2020 7113)  Progress: no change  Plan of Care Reviewed With: patient  Outcome Summary: Pts diet switched to dys. 4 nectar thick. Pt up with two to BSC. Pt feeling weak throughout the day and resting. VSS; NSR; RA. No complaints from pt at this time. Will continue to monitor.

## 2020-02-06 NOTE — THERAPY EVALUATION
Acute Care - Occupational Therapy Initial Evaluation  [facility]     Patient Name: Tho Kohli  : 10/8/1927  MRN: [id number]  Today's Date: 2020     Date of Referral to OT: 20       Admit Date: 2020       ICD-10-CM ICD-9-CM   1. Sepsis, due to unspecified organism, unspecified whether acute organ dysfunction present (CMS/Formerly [facility]) A41.9 038.9     995.91   2. Urinary tract infection without hematuria, site unspecified N39.0 599.0   3. Weakness R53.1 780.79   4. Impaired functional mobility, balance, gait, and endurance Z74.09 V49.89     Patient Active Problem List   Diagnosis   • Stroke (CMS/Formerly [facility])   • Essential hypertension   • Dysarthria   • Grade III diastolic dysfunction   • Elevated troponin   • Abnormal EKG   • Sepsis (CMS/Formerly [facility])   • Acute UTI (urinary tract infection)   • Hypomagnesemia     Past Medical History:   Diagnosis Date   • OAB (overactive bladder)    • Stroke (CMS/Formerly [facility])      History reviewed. No pertinent surgical history.       OT ASSESSMENT FLOWSHEET (last 12 hours)      Occupational Therapy Evaluation     Row Name 20 1520                   OT Evaluation Time/Intention    Subjective Information  complains of;weakness;fatigue  -KF        Document Type  evaluation  -KF        Mode of Treatment  occupational therapy  -KF        Patient Effort  good  -KF        Symptoms Noted During/After Treatment  fatigue  -KF           General Information    Patient Profile Reviewed?  yes  -KF        Patient Observations  alert;cooperative;agree to therapy  -KF        Prior Level of Function  independent:;ADL's;bed mobility;transfer;min assist:;all household mobility;max assist:;home management  -KF        Equipment Currently Used at Home  rollator  -KF        Existing Precautions/Restrictions  fall;other (see comments) very unsafe and impulsive   -KF        Risks Reviewed  patient:;LOB;nausea/vomiting;dizziness;increased discomfort;change in vital signs  -KF        Benefits Reviewed   patient:;improve function;increase independence;increase strength;increase balance;increase knowledge  -KF        Barriers to Rehab  cognitive status  -KF           Relationship/Environment    Lives With  facility resident  -KF        Concerns About Impact on Relationships  PC apt  -KF           Resource/Environmental Concerns    Current Living Arrangements  extended care facility  -KF           Home Main Entrance    Number of Stairs, Main Entrance  none  -KF           Cognitive Assessment/Interventions    Additional Documentation  Cognitive Assessment/Intervention (Group)  -KF           Cognitive Assessment/Intervention- PT/OT    Affect/Mental Status (Cognitive)  confused  -KF        Orientation Status (Cognition)  oriented x 3;verbal cues/prompts needed for orientation  -KF        Follows Commands (Cognition)  follows one step commands;50-74% accuracy;verbal cues/prompting required;repetition of directions required;physical/tactile prompts required  -KF        Cognitive Function (Cognitive)  safety deficit;executive function deficit  -KF        Executive Function Deficit (Cognition)  moderate deficit;impulse control;insight/awareness of deficits;judgment;organization/sequencing  -KF        Safety Deficit (Cognitive)  moderate deficit;awareness of need for assistance;impulsivity;judgment;safety precautions awareness  -KF        Cognitive Interventions (Cognitive)  occupation/activity based interventions;process/task specific training  -KF           Safety Issues, Functional Mobility    Safety Issues Affecting Function (Mobility)  impulsivity;insight into deficits/self awareness;positioning of assistive device;judgment;problem solving;safety precaution awareness;sequencing abilities  -KF           Bed Mobility Assessment/Treatment    Bed Mobility Assessment/Treatment  supine-sit;sit-supine;scooting/bridging  -KF        Scooting/Bridging [place] (Bed Mobility)  verbal cues;minimum assist (75% patient effort)   -KF        Supine-Sit [place] (Bed Mobility)  moderate assist (50% patient effort);verbal cues;nonverbal cues (demo/gesture);2 person assist  -KF        Sit-Supine [place] (Bed Mobility)  moderate assist (50% patient effort);verbal cues;nonverbal cues (demo/gesture);2 person assist  -KF        Bed Mobility, Safety Issues  decreased use of arms for pushing/pulling;decreased use of legs for bridging/pushing;cognitive deficits limit understanding  -KF        Assistive Device (Bed Mobility)  head of bed elevated;bed rails;draw sheet  -KF           Functional Mobility    Functional Mobility- Ind. Level  minimum assist (75% patient effort);2 person assist required;verbal cues required;nonverbal cues required (demo/gesture)  -KF        Functional Mobility- Device  rolling walker  -KF        Functional Mobility-Distance (Feet)  10 6+4  -KF        Functional Mobility- Safety Issues  step length decreased;sequencing ability decreased;balance decreased during turns;loses balance backward  -KF        Functional Mobility- Comment  requires extensive cues for safety   -KF           Transfer Assessment/Treatment    Transfer Assessment/Treatment  sit-stand transfer;stand-sit transfer;bed-chair transfer;chair-bed transfer  -KF        Comment (Transfers)  max cues for seq and HP   -KF           Bed-Chair Transfer    Bed-Chair [place] (Transfers)  moderate assist (50% patient effort);2 person assist;verbal cues;nonverbal cues (demo/gesture)  -KF        Assistive Device (Bed-Chair Transfers)  walker, front-wheeled  -KF           Chair-Bed Transfer    Chair-Bed [place] (Transfers)  moderate assist (50% patient effort);2 person assist;verbal cues;nonverbal cues (demo/gesture)  -KF        Assistive Device (Chair-Bed Transfers)  walker, front-wheeled  -KF           Sit-Stand Transfer    Sit-Stand [place] (Transfers)  moderate assist (50% patient effort);verbal cues;nonverbal cues (demo/gesture);2 person assist  -KF         Assistive Device (Sit-Stand Transfers)  walker, front-wheeled  -KF           Stand-Sit Transfer    Stand-Sit [place] (Transfers)  moderate assist (50% patient effort);2 person assist;verbal cues;nonverbal cues (demo/gesture)  -KF        Assistive Device (Stand-Sit Transfers)  walker, front-wheeled  -KF           ADL Assessment/Intervention    BADL Assessment/Intervention  lower body dressing;upper body dressing  -KF           Upper Body Dressing Assessment/Training    Upper Body Dressing [place] Level  don;doff;front opening garment;moderate assist (50% patient effort);verbal cues;nonverbal cues (demo/gesture)  -KF        Upper Body Dressing Position  edge of bed sitting  -KF        Comment (Upper Body Dressing)  CGA throughout   -KF           Lower Body Dressing Assessment/Training    Lower Body Dressing [place] Level  don;doff;socks;dependent (less than 25% patient effort)  -KF        Lower Body Dressing Position  edge of bed sitting  -KF           BADL Safety/Performance    Impairments, BADL Safety/Performance  balance;cognition;endurance/activity tolerance;range of motion;strength;trunk/postural control  -KF        Cognitive Impairments, BADL Safety/Performance  awareness, need for assistance;insight into deficits/self awareness;judgment;problem solving/reasoning;safety precaution awareness  -KF        Skilled BADL Treatment/Intervention  cognitive/safety deficit modifications;BADL process/adaptation training  -KF           General ROM    GENERAL ROM COMMENTS  BUE WFL   -KF           MMT (Manual Muscle Testing)    General MMT Comments  BUE grossly 3+/5  -KF           Motor Assessment/Interventions    Additional Documentation  Balance (Group);Balance Interventions (Group);Fine Motor Testing & Training (Group)  -KF           Balance    Balance  static sitting balance;static standing balance;dynamic sitting balance;dynamic standing balance  -KF           Static Sitting Balance    Level of  [place] (Unsupported Sitting, Static Balance)  contact guard assist  -KF        Sitting Position (Unsupported Sitting, Static Balance)  sitting on edge of bed  -KF        Time Able to Maintain Position (Unsupported Sitting, Static Balance)  3 to 4 minutes  -KF           Dynamic Sitting Balance    Level of [place], Reaches Outside Midline (Sitting, Dynamic Balance)  minimal assist, 75% patient effort  -KF        Sitting Position, Reaches Outside Midline (Sitting, Dynamic Balance)  sitting in chair;sitting on edge of bed  -KF           Static Standing Balance    Level of [place] (Supported Standing, Static Balance)  minimal assist, 75% patient effort;2 person assist  -KF        Time Able to Maintain Position (Supported Standing, Static Balance)  2 to 3 minutes  -KF        Assistive Device Utilized (Supported Standing, Static Balance)  walker, rolling  -KF           Dynamic Standing Balance    Level of [place], Reaches Outside Midline (Standing, Dynamic Balance)  minimal assist, 75% patient effort;moderate assist, 50 to 74% patient effort;2 person assist  -KF        Time Able to Maintain Position, Reaches Outside Midline (Standing, Dynamic Balance)  1 to 2 minutes  -KF        Assistive Device Utilized (Supported Standing, Dynamic Balance)  walker, rolling  -KF           Fine Motor Testing & Training    Training Activity, Fine Motor Coordination  other (see comments) donning glasses  -KF        Comment, Fine Motor Coordination  moderate deficits   -KF           Sensory Assessment/Intervention    Sensory General Assessment  light touch sensation deficits identified  -KF        Additional Documentation  Vision Assessment/Intervention (Group)  -KF           Light Touch Sensation Assessment    Left Upper Extremity: Light Touch Sensation Assessment  moderate impairment, 50 to 74% correct responses  -KF        Right Upper Extremity: Light Touch Sensation Assessment  moderate impairment, 50 to 74% correct  responses  -KF           Vision Assessment/Intervention    Visual Impairment/Limitations  corrective lenses full time  -KF           Positioning and Restraints    Pre-Treatment Position  in bed  -KF        Post Treatment Position  bed  -KF        In Bed  notified nsg;supine;fowlers;call light within reach;encouraged to call for assist;exit alarm on;side rails up x2;legs elevated  -KF           Pain Assessment    Additional Documentation  Pain Scale: FACES Pre/Post-Treatment (Group)  -KF           Pain Scale: Numbers Pre/Post-Treatment    Pain Scale: Numbers, Pretreatment  0/10 - no pain  -KF        Pain Scale: Numbers, Post-Treatment  0/10 - no pain  -KF           Pain Scale: FACES Pre/Post-Treatment    Pain: FACES Scale, Pretreatment  0-->no hurt  -KF        Pain: FACES Scale, Post-Treatment  0-->no hurt  -KF           Plan of Care Review    Plan of Care Reviewed With  patient  -KF        Progress  no change  -KF           Clinical Impression (OT)    Date of Referral to OT  02/05/20  -KF        OT Diagnosis  ADL decline   -KF        Patient/Family Goals Statement (OT Eval)  regain strength for ADL s  -KF        Criteria for Skilled Therapeutic Interventions Met (OT Eval)  yes;treatment indicated  -KF        Rehab Potential (OT Eval)  good, to achieve stated therapy goals  -KF        Therapy Frequency (OT Eval)  daily  -KF        Care Plan Review (OT)  care plan/treatment goals reviewed;evaluation/treatment results reviewed;risks/benefits reviewed;current/potential barriers reviewed;patient/other agree to care plan  -KF        Anticipated Equipment Needs at Discharge (OT)  -- TBD  -KF        Anticipated Discharge Disposition (OT)  skilled nursing facility  -KF           Vital Signs    Pre Systolic BP Rehab  140  -KF        Pre Treatment Diastolic BP  75 RN cleared VSS   -KF        Pre SpO2 (%)  93  -KF        O2 Delivery Pre Treatment  room air  -KF        Pre Patient Position  Supine  -KF        Intra Patient  Position  Standing  -KF        Post Patient Position  Supine  -KF        Rest Breaks   1  -KF           Planned OT Interventions    Planned Therapy Interventions (OT Eval)  activity tolerance training;adaptive equipment training;BADL retraining;cognitive/visual perception retraining;edema control/reduction;functional balance retraining;IADL retraining;neuromuscular control/coordination retraining;occupation/activity based interventions;patient/caregiver education/training;ROM/therapeutic exercise;strengthening exercise;transfer/mobility retraining  -KF           OT Goals    Transfer Goal Selection (OT)  transfer, OT goal 1  -KF        Dressing Goal Selection (OT)  dressing, OT goal 1  -KF        Balance Goal Selection (OT)  balance, OT goal 1  -KF        Additional Documentation  Balance Goal Selection (OT) (Row)  -KF           Transfer Goal 1 (OT)    Activity/Assistive Device (Transfer Goal 1, OT)  bed-to-chair/chair-to-bed;commode, bedside without drop arms;walker, rolling  -KF        [place] Level/Cues Needed (Transfer Goal 1, OT)  contact guard assist;verbal cues required  -KF        Time Frame (Transfer Goal 1, OT)  long term goal (LTG);by discharge  -KF        Progress/Outcome (Transfer Goal 1, OT)  goal ongoing  -KF           Dressing Goal 1 (OT)    Activity/Assistive Device (Dressing Goal 1, OT)  lower body dressing brief/ pants  -KF        [place]/Cues Needed (Dressing Goal 1, OT)  minimum assist (75% or more patient effort);verbal cues required  -KF        Time Frame (Dressing Goal 1, OT)  long term goal (LTG);by discharge  -KF        Progress/Outcome (Dressing Goal 1, OT)  goal ongoing  -KF           Balance Goal 1 (OT)    Activity/Assistive Device (Balance Goal 1, OT)  walker, rolling;standing, dynamic  -KF        [place] Level/Cues Needed (Balance Goal 1, OT)  contact guard assist;verbal cues required  -KF        Time Frame (Balance Goal 1, OT)  long term goal (LTG);by discharge  -KF         Progress/Outcomes (Balance Goal 1, OT)  goal ongoing  -KF           Living Environment    Home Accessibility  wheelchair accessible WI shower   -KF          User Key  (r) = Recorded By, (t) = Taken By, (c) = Cosigned By    Initials Name Effective Dates    KATHIE Hernández Neelam JASON, OT 04/03/18 -                OT Recommendation and Plan  Outcome Summary/Treatment Plan (OT)  Anticipated Equipment Needs at Discharge (OT): (TBD)  Anticipated Discharge Disposition (OT): skilled nursing facility  Planned Therapy Interventions (OT Eval): activity tolerance training, adaptive equipment training, BADL retraining, cognitive/visual perception retraining, edema control/reduction, functional balance retraining, IADL retraining, neuromuscular control/coordination retraining, occupation/activity based interventions, patient/caregiver education/training, ROM/therapeutic exercise, strengthening exercise, transfer/mobility retraining  Therapy Frequency (OT Eval): daily  Plan of Care Review  Plan of Care Reviewed With: patient  Plan of Care Reviewed With: patient  Outcome Summary: OT eval completed Pt demonstrates deficits in safety awareness, impulsive with sitting, requires max cues for safe seq of transfers, mod A x2 STS and functional mob min A x2 at FWW, mod A bed mob overall, dep socks; recom IPOT d/c SNF    Outcome Measures     Row Name 02/06/20 1520             How much help from another is currently needed...    Putting on and taking off regular lower body clothing?  1  -KF      Bathing (including washing, rinsing, and drying)  2  -KF      Toileting (which includes using toilet bed pan or urinal)  2  -KF      Putting on and taking off regular upper body clothing  3  -KF      Taking care of personal grooming (such as brushing teeth)  3  -KF      Eating meals  3  -KF      AM-PAC 6 Clicks Score (OT)  14  -KF         Functional Assessment    Outcome Measure Options  AM-PAC 6 Clicks Daily Activity (OT)  -KF        User Key  (r) =  Recorded By, (t) = Taken By, (c) = Cosigned By    Initials Name Provider Type    Neelam Bello OT Occupational Therapist          Time Calculation:   Time Calculation- OT     Row Name 02/06/20 1520             Time Calculation- OT    OT Start Time  1520  -KF      Total Timed Code Minutes- OT  10 minute(s)  -KF      OT Received On  02/06/20  -KF      OT Goal Re-Cert Due Date  02/16/20  -KF         Timed Charges    02963 - OT Therapeutic Activity Minutes  10  -KF        User Key  (r) = Recorded By, (t) = Taken By, (c) = Cosigned By    Initials Name Provider Type    Neelam Bello OT Occupational Therapist        Therapy Charges for Today     Code Description Service Date Service Provider Modifiers Qty    [id number]  OT THERAPEUTIC ACT EA 15 MIN 2/6/2020 Neelam Hernández OT GO 1    [id number]  OT EVAL MOD COMPLEXITY 3 2/6/2020 Neelam Hernández OT GO 1               Neelam Hernández OT  2/6/2020

## 2020-02-07 VITALS
SYSTOLIC BLOOD PRESSURE: 136 MMHG | DIASTOLIC BLOOD PRESSURE: 78 MMHG | BODY MASS INDEX: 22.46 KG/M2 | OXYGEN SATURATION: 91 % | HEART RATE: 65 BPM | RESPIRATION RATE: 18 BRPM | TEMPERATURE: 97.4 F | WEIGHT: 160.4 LBS | HEIGHT: 71 IN

## 2020-02-07 PROBLEM — A41.9 SEPSIS (HCC): Status: RESOLVED | Noted: 2020-02-05 | Resolved: 2020-02-07

## 2020-02-07 PROBLEM — E83.42 HYPOMAGNESEMIA: Status: RESOLVED | Noted: 2020-02-06 | Resolved: 2020-02-07

## 2020-02-07 LAB
BACTERIA SPEC AEROBE CULT: ABNORMAL
BASOPHILS # BLD AUTO: 0.03 10*3/MM3 (ref 0–0.2)
BASOPHILS NFR BLD AUTO: 0.2 % (ref 0–1.5)
DEPRECATED RDW RBC AUTO: 53 FL (ref 37–54)
EOSINOPHIL # BLD AUTO: 0.12 10*3/MM3 (ref 0–0.4)
EOSINOPHIL NFR BLD AUTO: 0.7 % (ref 0.3–6.2)
ERYTHROCYTE [DISTWIDTH] IN BLOOD BY AUTOMATED COUNT: 14.3 % (ref 12.3–15.4)
HCT VFR BLD AUTO: 30.5 % (ref 37.5–51)
HGB BLD-MCNC: 10.3 G/DL (ref 13–17.7)
IMM GRANULOCYTES # BLD AUTO: 0.07 10*3/MM3 (ref 0–0.05)
IMM GRANULOCYTES NFR BLD AUTO: 0.4 % (ref 0–0.5)
LYMPHOCYTES # BLD AUTO: 0.87 10*3/MM3 (ref 0.7–3.1)
LYMPHOCYTES NFR BLD AUTO: 5.3 % (ref 19.6–45.3)
MCH RBC QN AUTO: 33.9 PG (ref 26.6–33)
MCHC RBC AUTO-ENTMCNC: 33.8 G/DL (ref 31.5–35.7)
MCV RBC AUTO: 100.3 FL (ref 79–97)
MONOCYTES # BLD AUTO: 1.46 10*3/MM3 (ref 0.1–0.9)
MONOCYTES NFR BLD AUTO: 8.9 % (ref 5–12)
NEUTROPHILS # BLD AUTO: 13.9 10*3/MM3 (ref 1.7–7)
NEUTROPHILS NFR BLD AUTO: 84.5 % (ref 42.7–76)
NRBC BLD AUTO-RTO: 0 /100 WBC (ref 0–0.2)
PLATELET # BLD AUTO: 144 10*3/MM3 (ref 140–450)
PMV BLD AUTO: 10.6 FL (ref 6–12)
RBC # BLD AUTO: 3.04 10*6/MM3 (ref 4.14–5.8)
WBC NRBC COR # BLD: 16.45 10*3/MM3 (ref 3.4–10.8)

## 2020-02-07 PROCEDURE — 99217 PR OBSERVATION CARE DISCHARGE MANAGEMENT: CPT | Performed by: NURSE PRACTITIONER

## 2020-02-07 PROCEDURE — 51701 INSERT BLADDER CATHETER: CPT

## 2020-02-07 PROCEDURE — G0378 HOSPITAL OBSERVATION PER HR: HCPCS

## 2020-02-07 PROCEDURE — 85025 COMPLETE CBC W/AUTO DIFF WBC: CPT | Performed by: NURSE PRACTITIONER

## 2020-02-07 RX ORDER — CEFUROXIME AXETIL 500 MG/1
500 TABLET ORAL EVERY 12 HOURS SCHEDULED
Qty: 9 TABLET | Refills: 0
Start: 2020-02-07 | End: 2020-02-12

## 2020-02-07 RX ORDER — LIDOCAINE 50 MG/G
1 PATCH TOPICAL
Status: ON HOLD
Start: 2020-02-07 | End: 2020-09-24

## 2020-02-07 RX ORDER — CEFUROXIME AXETIL 250 MG/1
500 TABLET ORAL EVERY 12 HOURS SCHEDULED
Status: DISCONTINUED | OUTPATIENT
Start: 2020-02-07 | End: 2020-02-07 | Stop reason: HOSPADM

## 2020-02-07 RX ORDER — LOSARTAN POTASSIUM 25 MG/1
12.5 TABLET ORAL DAILY
Start: 2020-02-07

## 2020-02-07 RX ADMIN — Medication 1 CAPSULE: at 08:59

## 2020-02-07 RX ADMIN — SODIUM CHLORIDE, PRESERVATIVE FREE 10 ML: 5 INJECTION INTRAVENOUS at 09:00

## 2020-02-07 RX ADMIN — TAMSULOSIN HYDROCHLORIDE 0.4 MG: 0.4 CAPSULE ORAL at 08:59

## 2020-02-07 RX ADMIN — APIXABAN 2.5 MG: 2.5 TABLET, FILM COATED ORAL at 08:59

## 2020-02-07 RX ADMIN — CEFUROXIME AXETIL 500 MG: 250 TABLET ORAL at 09:03

## 2020-02-07 RX ADMIN — PANTOPRAZOLE SODIUM 40 MG: 40 TABLET, DELAYED RELEASE ORAL at 03:49

## 2020-02-07 NOTE — DISCHARGE PLACEMENT REQUEST
"Tho Elam (89 y.o. Male)   [facility]  From Anne Edgar [phone]    Date of Birth Social Security Number Address Home Phone MRN    10/08/1927  [address] [place] [address] [phone] [id number]    Orthodox Marital Status          Non-Taoism        Admission Date Admission Type Admitting Provider Attending Provider Department, Room/Bed    2/5/20 Emergency Faby Valerio DO Barbato, Hayley R, DO [address], S525/1    Discharge Date Discharge Disposition Discharge Destination         Rehab Facility or Unit (DC - External)              Attending Provider:  Faby Valerio DO    Allergies:  No Known Allergies    Isolation:  None   Infection:  None   Code Status:  No CPR    Ht:  180.3 cm (71\")   Wt:  72.8 kg (160 lb 6.4 oz)    Admission Cmt:  None   Principal Problem:  Sepsis (CMS/Formerly [facility]) [A41.9]                 Active Insurance as of 2/5/2020     Primary Coverage     Payor Plan Insurance Group Employer/Plan Group    MEDICARE MEDICARE A & B      Payor Plan Address Payor Plan Phone Number Payor Plan Fax Number Effective Dates    [address] [phone]  10/1/1992 - None Entered    Formerly [facility] [id number]       Subscriber Name Subscriber Birth Date Member ID       THO ELAM 10/8/1927 [id number]           Secondary Coverage     Payor Plan Insurance Group Employer/Plan Group    AARP MC SUP AARP HEALTH CARE OPTIONS      Payor Plan Address Payor Plan Phone Number Payor Plan Fax Number Effective Dates    [facility] [phone]  1/1/2019 - None Entered    [address]       [facility] [id number]       Subscriber Name Subscriber Birth Date Member ID       THO ELAM 10/8/1927 [id number]                 Emergency Contacts      (Rel.) Home Phone Work Phone Mobile Phone    Milagro Guardado (Daughter) [phone] -- --    BarajasJuventino minaya (Son) [phone] --                 Discharge Summary      Gi Pitt, APRN at " 20 0944              [facility] Medicine Services  Clinical Decision Unit  DISCHARGE SUMMARY    Patient Name: Tho Kohli  : 10/8/1927  MRN: [id number]    Admission Date and Time: 2020  7:53 PM  Discharge Date and Time:  20    Primary Care Physician: Daniel Adams MD    Hospital Course     Active Hospital Problems    Diagnosis  POA   • Acute UTI (urinary tract infection) [N39.0]  Unknown   • Grade III diastolic dysfunction [I51.9]  Yes   • Dysarthria [R47.1]  Yes   • Stroke (CMS/HCC) [I63.9]  Yes   • Essential hypertension [I10]  Yes      Resolved Hospital Problems    Diagnosis Date Resolved POA   • **Sepsis (CMS/HCC) [A41.9] 2020 Yes   • Hypomagnesemia [E83.42] 2020 Unknown          Brief CDU Course:  Tho Kohli is a 89 y.o. male with recent CVA and subsequent urinary retention who performs TID self catheterizations presents with fever and weakness. Admitted for sepsis with Klebsiella UTI. Treated with IV Rocephin x 2 days and transitioned to oral ceftin prior to discharge. Patient tolerating well. No n/v/d. Fever resolving. No growth on blood cultures to date.    Continue Ceftin x 5 more days. Given IVFs with improvement in BP. HCTZ and losartan held on admission. Restart 1/2 dose losartan tomorrow. Resume other meds as BP allows.    Patient working with PT while here. Recommend continued skilled PT/OT upon dc. Will transfer to [facility] for ongoing rehab services at discharge.     Key Discharge Recommendations:  Follow up with PCP in 1 week. Continue to monitor BP and restart HCTZ and losartan 25mg as BP allows    Discharge Evaluation     Vital Signs:   Temp:  [98 °F (36.7 °C)-100.8 °F (38.2 °C)] 98.5 °F (36.9 °C)  Heart Rate:  [65-79] 65  Resp:  [16-18] 18  BP: (113-140)/(57-94) 132/94     Physical Exam:  Constitutional: No acute distress, awake, alert  HENT: NCAT, mucous membranes moist  Respiratory: Clear to auscultation  bilaterally, respiratory effort normal   Cardiovascular: RRR, no murmurs, rubs, or gallops, palpable pedal pulses bilaterally  Gastrointestinal: Positive bowel sounds, soft, nontender, nondistended  Musculoskeletal: No bilateral ankle edema  Psychiatric: Appropriate affect, cooperative  Neurologic: Oriented x 3, strength symmetric in all extremities, Cranial Nerves grossly intact to confrontation, speech clear  Skin: No rashes      Pertinent  and/or Most Recent Results     Results from last 7 days   Lab Units 02/07/20  0900 02/06/20  0430 02/05/20 2007   WBC 10*3/mm3 16.45* 19.59* 18.39*   HEMOGLOBIN g/dL 10.3* 10.6* 12.0*   HEMATOCRIT % 30.5* 32.1* 35.7*   PLATELETS 10*3/mm3 144 143 176   SODIUM mmol/L  --  138 141   POTASSIUM mmol/L  --  4.1 3.8   CHLORIDE mmol/L  --  102 105   CO2 mmol/L  --  25.0 25.0   BUN mg/dL  --  19 25*   CREATININE mg/dL  --  0.94 1.06   GLUCOSE mg/dL  --  98 126*   CALCIUM mg/dL  --  8.4 9.2     Results from last 7 days   Lab Units 02/05/20 2007   BILIRUBIN mg/dL 0.8   ALK PHOS U/L 123*   ALT (SGPT) U/L 27   AST (SGOT) U/L 32           Invalid input(s): TG, LDLCALC, LDLREALC  Results from last 7 days   Lab Units 02/06/20  0827 02/05/20 2007   TROPONIN T ng/mL  --  0.021   LACTATE mmol/L 0.9  --        Brief Urine Lab Results  (Last result in the past 365 days)      Color   Clarity   Blood   Leuk Est   Nitrite   Protein   CREAT   Urine HCG        02/05/20 2007 Yellow Cloudy Moderate (2+) Large (3+) Positive 30 mg/dL (1+)               Microbiology Results Abnormal     Procedure Component Value - Date/Time    Urine Culture - Urine, Urine, Catheter [[id number]]  (Abnormal)  (Susceptibility) Collected:  02/05/20 2007    Lab Status:  Final result Specimen:  Urine, Catheter Updated:  02/07/20 0304     Urine Culture >100,000 CFU/mL Klebsiella pneumoniae ssp pneumoniae    Susceptibility      Klebsiella pneumoniae ssp pneumoniae     AMY     Ampicillin Resistant     Ampicillin + Sulbactam  Susceptible     Cefazolin Susceptible     Cefepime Susceptible     Ceftazidime Susceptible     Ceftriaxone Susceptible     Gentamicin Susceptible     Levofloxacin Susceptible     Nitrofurantoin Susceptible     Piperacillin + Tazobactam Susceptible     Tetracycline Susceptible     Trimethoprim + Sulfamethoxazole Susceptible                    Blood Culture - Blood, Wrist, Left [[id number]] Collected:  02/05/20 2000    Lab Status:  Preliminary result Specimen:  Blood from Wrist, Left Updated:  02/06/20 2100     Blood Culture No growth at 24 hours    Blood Culture - Blood, Arm, Right [[id number]] Collected:  02/05/20 1950    Lab Status:  Preliminary result Specimen:  Blood from Arm, Right Updated:  02/06/20 2100     Blood Culture No growth at 24 hours          Imaging Results (All)     Procedure Component Value Units Date/Time    FL Video Swallow With Speech Single Contrast [[id number]] Collected:  02/06/20 1416     Updated:  02/06/20 1801    Narrative:       EXAMINATION: FL VIDEO SWALLOW W SPEECH SINGLE-CONTRAST-     INDICATION: hx dysphagia; A41.9-Sepsis, unspecified organism;  N39.0-Urinary tract infection, site not specified; R53.1-Weakness;  Z74.09-Other reduced mobility     TECHNIQUE: 1 minute and 18 seconds of fluoroscopic time was used for  this exam. 1 associated image was saved. The patient was evaluated in  the seated lateral position while taking a variety of barium by mouth  under the direction of speech pathology.     COMPARISON: NONE     FINDINGS: There was aspiration with sips of thin consistency barium.  There was no penetration and no aspiration with nectar, pudding, or  solid consistency barium.          Impression:       Fluoroscopy provided for a modified barium swallow. Please  see speech therapy report for full details and recommendations.         This report was finalized on 2/6/2020 5:58 PM by Dr. Ana Cristina Luke MD.       XR Chest 1 View [[id number]] Collected:  02/05/20 2053     Updated:   02/05/20 2055    Narrative:       CR Chest 1 Vw    INDICATION:   Weakness dizziness altered mental status. Patient has been running a fever today.     COMPARISON:    Chest x-ray 12/9/2019.    FINDINGS:  Single portable AP view(s) of the chest.  There is a large hiatal hernia redemonstrated. There is mild cardiac silhouette enlargement. There is underlying chronic lung disease with distortion of pulmonary parenchymal architecture not appreciably changed  from December 19. There is no pneumothorax. There is no pleural effusion. There is no acute infiltrate or acute congestive failure.      Impression:         1. Redemonstration of a large hiatal hernia, unchanged.  2. Redemonstration of cardiac silhouette enlargement and distortion of parenchymal architecture consistent with chronic lung disease. No active disease. No change from prior.    Signer Name: Shoshana Diamond MD   Signed: 2/5/2020 8:53 PM   Workstation Name: [email or web address]    [facility]          Results for orders placed during the hospital encounter of 12/09/19   Duplex Venous Lower Extremity - Bilateral CAR    Narrative · Normal bilateral lower extremity venous duplex scan.          Results for orders placed during the hospital encounter of 12/09/19   Duplex Venous Lower Extremity - Bilateral CAR    Narrative · Normal bilateral lower extremity venous duplex scan.          Results for orders placed during the hospital encounter of 12/09/19   Adult Transesophageal Echo (BLADIMIR) W/ Cont if Necessary Per Protocol    Narrative · Technically difficult and limited study due to presence of hiatal   hernia.  · No evidence of left atrial appendage thrombus  · Bubble study was negative for right to left interatrial shunt. No   evidence PFO  · Trileaflet aortic valve without significant abnormality  · Mild grade 1 plaque in descending aorta and aortic arch  · No cardiac source of embolus identified on this limited study            Plan for Follow-up of  Pending Labs/Results:    Order Current Status    Blood Culture - Blood, Arm, Right Preliminary result    Blood Culture - Blood, Wrist, Left Preliminary result        Discharge Medications and Follow-Up        Discharge Medications      New Medications      Instructions Start Date   cefuroxime 500 MG tablet  Commonly known as:  CEFTIN   500 mg, Oral, Every 12 Hours Scheduled      lidocaine 5 %  Commonly known as:  LIDODERM   1 patch, Transdermal, Every 24 Hours Scheduled, Remove & Discard patch within 12 hours or as directed by MD         Changes to Medications      Instructions Start Date   losartan 25 MG tablet  Commonly known as:  COZAAR  What changed:  how much to take   12.5 mg, Oral, Daily         Continue These Medications      Instructions Start Date   acetaminophen 325 MG tablet  Commonly known as:  TYLENOL   650 mg, Oral, Every 6 Hours PRN      apixaban 2.5 MG tablet tablet  Commonly known as:  ELIQUIS   2.5 mg, Oral, Every 12 Hours Scheduled      atorvastatin 80 MG tablet  Commonly known as:  LIPITOR   80 mg, Oral, Nightly      pantoprazole 40 MG EC tablet  Commonly known as:  PROTONIX   40 mg, Oral, Daily      PROBIOTIC DAILY PO   Oral      tamsulosin 0.4 MG capsule 24 hr capsule  Commonly known as:  FLOMAX   0.4 mg, Oral, Daily         Stop These Medications    APLISOL 5 UNIT/0.1ML injection  Generic drug:  tuberculin     hydroCHLOROthiazide 25 MG tablet  Commonly known as:  HYDRODIURIL              No future appointments.    Additional Instructions for the Follow-ups that You Need to Schedule     Discharge Follow-up with PCP   As directed       Currently Documented PCP:    Daniel Adams MD    PCP Phone Number:    [phone]     Follow Up Details:  1 week               Time Spent on Discharge:  35 minutes    Electronically signed by WILLA Grant, 02/07/20, 9:44 AM.      Electronically signed by Gi Pitt APRN at 02/07/20 0949

## 2020-02-07 NOTE — PROGRESS NOTES
Case Management Discharge Note      Final Note: Mason Banuelos at [facility] for the pt to return to a skilled bed there today. Please  call report to [phone] and send the copied chart, summary and any hard scripts for controlled meds with the pt. [facility] van to transport at 1330. The pt will need to be at the [address] by 1315 today.         Destination - Selection Complete      Service Provider Request Status Selected Services Address Phone Number Fax Number    [facility] Selected Skilled Nursing [address] [id number] [phone]      Durable Medical Equipment      No service has been selected for the patient.      Dialysis/Infusion      No service has been selected for the patient.      Home Medical Care      No service has been selected for the patient.      Therapy      No service has been selected for the patient.      Community Resources      No service has been selected for the patient.             Final Discharge Disposition Code: 03 - skilled nursing facility (SNF)

## 2020-02-07 NOTE — PROGRESS NOTES
Continued Stay Note  [facility]     Patient Name: Tho Kohli  MRN: [id number]  Today's Date: 2/7/2020    Admit Date: 2/5/2020    Discharge Plan     Row Name 02/07/20 0958       Plan    Plan  [facility]    Patient/Family in Agreement with Plan  yes    Plan Comments  I spoke with Lakisha from [facility] yesterday. They feel the pt will need to return to skilled care at [facility] .  Lakisha reports the pt is still under his 30 day window so his insurance will cover skilled care. The pt and his son are in agreement with this plan.    Final Discharge Disposition Code  03 - skilled nursing facility (SNF)        Discharge Codes    No documentation.       Expected Discharge Date and Time     Expected Discharge Date Expected Discharge Time    Feb 7, 2020             Anne Edgar RN

## 2020-02-07 NOTE — PLAN OF CARE
Patient denies complaints of pain. Generalized weakness continues and patient is still requiring assistance with intermittent catheterization.

## 2020-02-07 NOTE — DISCHARGE SUMMARY
[facility] Medicine Services  Clinical Decision Unit  DISCHARGE SUMMARY    Patient Name: Tho Kohli  : 10/8/1927  MRN: [id number]    Admission Date and Time: 2020  7:53 PM  Discharge Date and Time:  20    Primary Care Physician: Daniel Adams MD    Hospital Course     Active Hospital Problems    Diagnosis  POA   • Acute UTI (urinary tract infection) [N39.0]  Unknown   • Grade III diastolic dysfunction [I51.9]  Yes   • Dysarthria [R47.1]  Yes   • Stroke (CMS/HCC) [I63.9]  Yes   • Essential hypertension [I10]  Yes      Resolved Hospital Problems    Diagnosis Date Resolved POA   • **Sepsis (CMS/HCC) [A41.9] 2020 Yes   • Hypomagnesemia [E83.42] 2020 Unknown          Brief CDU Course:  Tho Kohli is a 89 y.o. male with recent CVA and subsequent urinary retention who performs TID self catheterizations presents with fever and weakness. Admitted for sepsis with Klebsiella UTI. Treated with IV Rocephin x 2 days and transitioned to oral ceftin prior to discharge. Patient tolerating well. No n/v/d. Fever resolving. No growth on blood cultures to date.    Continue Ceftin x 5 more days. Given IVFs with improvement in BP. HCTZ and losartan held on admission. Restart 1/2 dose losartan tomorrow. Resume other meds as BP allows.    Patient working with PT while here. Recommend continued skilled PT/OT upon dc. Will transfer to [facility] for ongoing rehab services at discharge.     Key Discharge Recommendations:  Follow up with PCP in 1 week. Continue to monitor BP and restart HCTZ and losartan 25mg as BP allows    Discharge Evaluation     Vital Signs:   Temp:  [98 °F (36.7 °C)-100.8 °F (38.2 °C)] 98.5 °F (36.9 °C)  Heart Rate:  [65-79] 65  Resp:  [16-18] 18  BP: (113-140)/(57-94) 132/94     Physical Exam:  Constitutional: No acute distress, awake, alert  HENT: NCAT, mucous membranes moist  Respiratory: Clear to auscultation bilaterally, respiratory effort  normal   Cardiovascular: RRR, no murmurs, rubs, or gallops, palpable pedal pulses bilaterally  Gastrointestinal: Positive bowel sounds, soft, nontender, nondistended  Musculoskeletal: No bilateral ankle edema  Psychiatric: Appropriate affect, cooperative  Neurologic: Oriented x 3, strength symmetric in all extremities, Cranial Nerves grossly intact to confrontation, speech clear  Skin: No rashes      Pertinent  and/or Most Recent Results     Results from last 7 days   Lab Units 02/07/20  0900 02/06/20  0430 02/05/20 2007   WBC 10*3/mm3 16.45* 19.59* 18.39*   HEMOGLOBIN g/dL 10.3* 10.6* 12.0*   HEMATOCRIT % 30.5* 32.1* 35.7*   PLATELETS 10*3/mm3 144 143 176   SODIUM mmol/L  --  138 141   POTASSIUM mmol/L  --  4.1 3.8   CHLORIDE mmol/L  --  102 105   CO2 mmol/L  --  25.0 25.0   BUN mg/dL  --  19 25*   CREATININE mg/dL  --  0.94 1.06   GLUCOSE mg/dL  --  98 126*   CALCIUM mg/dL  --  8.4 9.2     Results from last 7 days   Lab Units 02/05/20 2007   BILIRUBIN mg/dL 0.8   ALK PHOS U/L 123*   ALT (SGPT) U/L 27   AST (SGOT) U/L 32           Invalid input(s): TG, LDLCALC, LDLREALC  Results from last 7 days   Lab Units 02/06/20  0827 02/05/20 2007   TROPONIN T ng/mL  --  0.021   LACTATE mmol/L 0.9  --        Brief Urine Lab Results  (Last result in the past 365 days)      Color   Clarity   Blood   Leuk Est   Nitrite   Protein   CREAT   Urine HCG        02/05/20 2007 Yellow Cloudy Moderate (2+) Large (3+) Positive 30 mg/dL (1+)               Microbiology Results Abnormal     Procedure Component Value - Date/Time    Urine Culture - Urine, Urine, Catheter [[id number]]  (Abnormal)  (Susceptibility) Collected:  02/05/20 2007    Lab Status:  Final result Specimen:  Urine, Catheter Updated:  02/07/20 0304     Urine Culture >100,000 CFU/mL Klebsiella pneumoniae ssp pneumoniae    Susceptibility      Klebsiella pneumoniae ssp pneumoniae     AMY     Ampicillin Resistant     Ampicillin + Sulbactam Susceptible     Cefazolin Susceptible        Cefepime Susceptible     Ceftazidime Susceptible     Ceftriaxone Susceptible     Gentamicin Susceptible     Levofloxacin Susceptible     Nitrofurantoin Susceptible     Piperacillin + Tazobactam Susceptible     Tetracycline Susceptible     Trimethoprim + Sulfamethoxazole Susceptible                    Blood Culture - Blood, Wrist, Left [[id number]] Collected:  02/05/20 2000    Lab Status:  Preliminary result Specimen:  Blood from Wrist, Left Updated:  02/06/20 2100     Blood Culture No growth at 24 hours    Blood Culture - Blood, Arm, Right [[id number]] Collected:  02/05/20 1950    Lab Status:  Preliminary result Specimen:  Blood from Arm, Right Updated:  02/06/20 2100     Blood Culture No growth at 24 hours          Imaging Results (All)     Procedure Component Value Units Date/Time    FL Video Swallow With Speech Single Contrast [[id number]] Collected:  02/06/20 1416     Updated:  02/06/20 1801    Narrative:       EXAMINATION: FL VIDEO SWALLOW W SPEECH SINGLE-CONTRAST-     INDICATION: hx dysphagia; A41.9-Sepsis, unspecified organism;  N39.0-Urinary tract infection, site not specified; R53.1-Weakness;  Z74.09-Other reduced mobility     TECHNIQUE: 1 minute and 18 seconds of fluoroscopic time was used for  this exam. 1 associated image was saved. The patient was evaluated in  the seated lateral position while taking a variety of barium by mouth  under the direction of speech pathology.     COMPARISON: NONE     FINDINGS: There was aspiration with sips of thin consistency barium.  There was no penetration and no aspiration with nectar, pudding, or  solid consistency barium.          Impression:       Fluoroscopy provided for a modified barium swallow. Please  see speech therapy report for full details and recommendations.         This report was finalized on 2/6/2020 5:58 PM by Dr. Ana Cristina Luke MD.       XR Chest 1 View [[id number]] Collected:  02/05/20 2053     Updated:  02/05/20 2055    Narrative:       CR  Chest 1 Vw    INDICATION:   Weakness dizziness altered mental status. Patient has been running a fever today.     COMPARISON:    Chest x-ray 12/9/2019.    FINDINGS:  Single portable AP view(s) of the chest.  There is a large hiatal hernia redemonstrated. There is mild cardiac silhouette enlargement. There is underlying chronic lung disease with distortion of pulmonary parenchymal architecture not appreciably changed  from December 19. There is no pneumothorax. There is no pleural effusion. There is no acute infiltrate or acute congestive failure.      Impression:         1. Redemonstration of a large hiatal hernia, unchanged.  2. Redemonstration of cardiac silhouette enlargement and distortion of parenchymal architecture consistent with chronic lung disease. No active disease. No change from prior.    Signer Name: Shoshana Diamond MD   Signed: 2/5/2020 8:53 PM   Workstation Name: [email or web address]    [facility]          Results for orders placed during the hospital encounter of 12/09/19   Duplex Venous Lower Extremity - Bilateral CAR    Narrative · Normal bilateral lower extremity venous duplex scan.          Results for orders placed during the hospital encounter of 12/09/19   Duplex Venous Lower Extremity - Bilateral CAR    Narrative · Normal bilateral lower extremity venous duplex scan.          Results for orders placed during the hospital encounter of 12/09/19   Adult Transesophageal Echo (BLADIMIR) W/ Cont if Necessary Per Protocol    Narrative · Technically difficult and limited study due to presence of hiatal   hernia.  · No evidence of left atrial appendage thrombus  · Bubble study was negative for right to left interatrial shunt. No   evidence PFO  · Trileaflet aortic valve without significant abnormality  · Mild grade 1 plaque in descending aorta and aortic arch  · No cardiac source of embolus identified on this limited study            Plan for Follow-up of Pending Labs/Results:    Order Current  Status    Blood Culture - Blood, Arm, Right Preliminary result    Blood Culture - Blood, Wrist, Left Preliminary result        Discharge Medications and Follow-Up        Discharge Medications      New Medications      Instructions Start Date   cefuroxime 500 MG tablet  Commonly known as:  CEFTIN   500 mg, Oral, Every 12 Hours Scheduled      lidocaine 5 %  Commonly known as:  LIDODERM   1 patch, Transdermal, Every 24 Hours Scheduled, Remove & Discard patch within 12 hours or as directed by MD         Changes to Medications      Instructions Start Date   losartan 25 MG tablet  Commonly known as:  COZAAR  What changed:  how much to take   12.5 mg, Oral, Daily         Continue These Medications      Instructions Start Date   acetaminophen 325 MG tablet  Commonly known as:  TYLENOL   650 mg, Oral, Every 6 Hours PRN      apixaban 2.5 MG tablet tablet  Commonly known as:  ELIQUIS   2.5 mg, Oral, Every 12 Hours Scheduled      atorvastatin 80 MG tablet  Commonly known as:  LIPITOR   80 mg, Oral, Nightly      pantoprazole 40 MG EC tablet  Commonly known as:  PROTONIX   40 mg, Oral, Daily      PROBIOTIC DAILY PO   Oral      tamsulosin 0.4 MG capsule 24 hr capsule  Commonly known as:  FLOMAX   0.4 mg, Oral, Daily         Stop These Medications    APLISOL 5 UNIT/0.1ML injection  Generic drug:  tuberculin     hydroCHLOROthiazide 25 MG tablet  Commonly known as:  HYDRODIURIL              No future appointments.    Additional Instructions for the Follow-ups that You Need to Schedule     Discharge Follow-up with PCP   As directed       Currently Documented PCP:    Daniel Adams MD    PCP Phone Number:    [phone]     Follow Up Details:  1 week               Time Spent on Discharge:  35 minutes    Electronically signed by WILLA Grant, 02/07/20, 9:44 AM.

## 2020-02-10 LAB
BACTERIA SPEC AEROBE CULT: NORMAL
BACTERIA SPEC AEROBE CULT: NORMAL

## 2020-09-23 ENCOUNTER — HOSPITAL ENCOUNTER (INPATIENT)
Facility: HOSPITAL | Age: 85
LOS: 1 days | Discharge: NURSING, REHAB OR LONG-TERM CARE | End: 2020-09-24
Attending: EMERGENCY MEDICINE | Admitting: INTERNAL MEDICINE

## 2020-09-23 DIAGNOSIS — K06.8 GINGIVAL BLEEDING: ICD-10-CM

## 2020-09-23 DIAGNOSIS — K08.409 STATUS POST TOOTH EXTRACTION: ICD-10-CM

## 2020-09-23 DIAGNOSIS — L76.32 POSTOPERATIVE HEMATOMA OF SUBCUTANEOUS TISSUE FOLLOWING NON-DERMATOLOGIC PROCEDURE: ICD-10-CM

## 2020-09-23 DIAGNOSIS — S00.83XA FACIAL HEMATOMA, INITIAL ENCOUNTER: Primary | ICD-10-CM

## 2020-09-23 LAB
INR PPP: 1.34 (ref 0.85–1.16)
PROTHROMBIN TIME: 16.3 SECONDS (ref 11.5–14)

## 2020-09-23 PROCEDURE — 86900 BLOOD TYPING SEROLOGIC ABO: CPT | Performed by: EMERGENCY MEDICINE

## 2020-09-23 PROCEDURE — 85610 PROTHROMBIN TIME: CPT | Performed by: EMERGENCY MEDICINE

## 2020-09-23 PROCEDURE — 99284 EMERGENCY DEPT VISIT MOD MDM: CPT

## 2020-09-23 PROCEDURE — 86850 RBC ANTIBODY SCREEN: CPT | Performed by: EMERGENCY MEDICINE

## 2020-09-23 PROCEDURE — 86901 BLOOD TYPING SEROLOGIC RH(D): CPT | Performed by: EMERGENCY MEDICINE

## 2020-09-23 PROCEDURE — 85025 COMPLETE CBC W/AUTO DIFF WBC: CPT | Performed by: EMERGENCY MEDICINE

## 2020-09-23 PROCEDURE — 80053 COMPREHEN METABOLIC PANEL: CPT | Performed by: EMERGENCY MEDICINE

## 2020-09-23 RX ORDER — SODIUM CHLORIDE 0.9 % (FLUSH) 0.9 %
10 SYRINGE (ML) INJECTION AS NEEDED
Status: DISCONTINUED | OUTPATIENT
Start: 2020-09-23 | End: 2020-09-24 | Stop reason: HOSPADM

## 2020-09-23 RX ORDER — NYSTATIN 100000 U/G
1 OINTMENT TOPICAL 2 TIMES DAILY
COMMUNITY

## 2020-09-23 RX ORDER — AMOXICILLIN 875 MG/1
875 TABLET, COATED ORAL 2 TIMES DAILY
COMMUNITY

## 2020-09-23 RX ORDER — SODIUM CHLORIDE 9 MG/ML
75 INJECTION, SOLUTION INTRAVENOUS CONTINUOUS
Status: DISCONTINUED | OUTPATIENT
Start: 2020-09-23 | End: 2020-09-24

## 2020-09-23 RX ORDER — SKIN PROTECTANT 44 G/100G
1 OINTMENT TOPICAL DAILY PRN
COMMUNITY

## 2020-09-23 RX ORDER — LEVOFLOXACIN 500 MG/1
500 TABLET, FILM COATED ORAL DAILY
COMMUNITY
End: 2020-09-24 | Stop reason: HOSPADM

## 2020-09-23 RX ADMIN — SODIUM CHLORIDE 125 ML/HR: 9 INJECTION, SOLUTION INTRAVENOUS at 23:45

## 2020-09-24 ENCOUNTER — APPOINTMENT (OUTPATIENT)
Dept: CT IMAGING | Facility: HOSPITAL | Age: 85
End: 2020-09-24

## 2020-09-24 ENCOUNTER — HOSPITAL ENCOUNTER (OUTPATIENT)
Facility: HOSPITAL | Age: 85
Setting detail: OBSERVATION
Discharge: NURSING, REHAB OR LONG-TERM CARE | End: 2020-09-26
Attending: EMERGENCY MEDICINE | Admitting: INTERNAL MEDICINE

## 2020-09-24 VITALS
HEIGHT: 71 IN | RESPIRATION RATE: 16 BRPM | TEMPERATURE: 97.9 F | WEIGHT: 161.9 LBS | OXYGEN SATURATION: 96 % | SYSTOLIC BLOOD PRESSURE: 152 MMHG | DIASTOLIC BLOOD PRESSURE: 90 MMHG | HEART RATE: 58 BPM | BODY MASS INDEX: 22.67 KG/M2

## 2020-09-24 DIAGNOSIS — M27.61 HEMORRHAGIC COMPLICATIONS OF DENTAL IMPLANT PLACEMENT: ICD-10-CM

## 2020-09-24 DIAGNOSIS — T14.8XXA HEMATOMA: Primary | ICD-10-CM

## 2020-09-24 PROBLEM — Z87.898 H/O URINARY RETENTION: Status: ACTIVE | Noted: 2020-09-24

## 2020-09-24 PROBLEM — R94.31 ABNORMAL EKG: Status: RESOLVED | Noted: 2019-12-10 | Resolved: 2020-09-24

## 2020-09-24 PROBLEM — S10.93XA HEMATOMA OF NECK: Status: ACTIVE | Noted: 2020-09-24

## 2020-09-24 PROBLEM — K13.79 ORAL BLEEDING: Status: RESOLVED | Noted: 2020-09-24 | Resolved: 2020-09-24

## 2020-09-24 PROBLEM — K13.79 ORAL BLEEDING: Status: ACTIVE | Noted: 2020-09-24

## 2020-09-24 PROBLEM — D64.9 ANEMIA: Status: ACTIVE | Noted: 2020-09-24

## 2020-09-24 PROBLEM — R47.1 DYSARTHRIA: Status: RESOLVED | Noted: 2019-12-09 | Resolved: 2020-09-24

## 2020-09-24 PROBLEM — R79.89 ELEVATED LFTS: Status: ACTIVE | Noted: 2020-09-24

## 2020-09-24 PROBLEM — R22.0 FACIAL SWELLING: Status: ACTIVE | Noted: 2020-09-24

## 2020-09-24 PROBLEM — R77.8 ELEVATED TROPONIN: Status: RESOLVED | Noted: 2019-12-10 | Resolved: 2020-09-24

## 2020-09-24 PROBLEM — N39.0 ACUTE UTI (URINARY TRACT INFECTION): Status: RESOLVED | Noted: 2020-02-05 | Resolved: 2020-09-24

## 2020-09-24 PROBLEM — Z92.89 HISTORY OF DENTAL SURGERY: Status: ACTIVE | Noted: 2020-09-24

## 2020-09-24 PROBLEM — D72.829 LEUKOCYTOSIS: Status: ACTIVE | Noted: 2020-09-24

## 2020-09-24 PROBLEM — I63.9 STROKE (HCC): Status: RESOLVED | Noted: 2019-12-09 | Resolved: 2020-09-24

## 2020-09-24 PROBLEM — Z86.73 HISTORY OF STROKE: Status: ACTIVE | Noted: 2020-09-24

## 2020-09-24 PROBLEM — I48.0 PAROXYSMAL ATRIAL FIBRILLATION (HCC): Status: ACTIVE | Noted: 2020-09-24

## 2020-09-24 LAB
ABO GROUP BLD: NORMAL
ALBUMIN SERPL-MCNC: 3.2 G/DL (ref 3.5–5.2)
ALBUMIN SERPL-MCNC: 3.8 G/DL (ref 3.5–5.2)
ALBUMIN/GLOB SERPL: 1 G/DL
ALBUMIN/GLOB SERPL: 1.1 G/DL
ALP SERPL-CCNC: 582 U/L (ref 39–117)
ALP SERPL-CCNC: 707 U/L (ref 39–117)
ALT SERPL W P-5'-P-CCNC: 82 U/L (ref 1–41)
ALT SERPL W P-5'-P-CCNC: 99 U/L (ref 1–41)
ANION GAP SERPL CALCULATED.3IONS-SCNC: 10 MMOL/L (ref 5–15)
ANION GAP SERPL CALCULATED.3IONS-SCNC: 11 MMOL/L (ref 5–15)
ANION GAP SERPL CALCULATED.3IONS-SCNC: 9 MMOL/L (ref 5–15)
APTT PPP: 34.2 SECONDS (ref 24–37)
AST SERPL-CCNC: 80 U/L (ref 1–40)
AST SERPL-CCNC: 97 U/L (ref 1–40)
BASOPHILS # BLD AUTO: 0.03 10*3/MM3 (ref 0–0.2)
BASOPHILS NFR BLD AUTO: 0.2 % (ref 0–1.5)
BASOPHILS NFR BLD AUTO: 0.2 % (ref 0–1.5)
BASOPHILS NFR BLD AUTO: 0.3 % (ref 0–1.5)
BILIRUB SERPL-MCNC: 0.9 MG/DL (ref 0–1.2)
BILIRUB SERPL-MCNC: 0.9 MG/DL (ref 0–1.2)
BLD GP AB SCN SERPL QL: NEGATIVE
BUN SERPL-MCNC: 23 MG/DL (ref 8–23)
BUN SERPL-MCNC: 27 MG/DL (ref 8–23)
BUN SERPL-MCNC: 28 MG/DL (ref 8–23)
BUN/CREAT SERPL: 21.1 (ref 7–25)
BUN/CREAT SERPL: 22.2 (ref 7–25)
BUN/CREAT SERPL: 23.9 (ref 7–25)
CALCIUM SPEC-SCNC: 8.9 MG/DL (ref 8.2–9.6)
CALCIUM SPEC-SCNC: 9.2 MG/DL (ref 8.2–9.6)
CALCIUM SPEC-SCNC: 9.5 MG/DL (ref 8.2–9.6)
CHLORIDE SERPL-SCNC: 103 MMOL/L (ref 98–107)
CHLORIDE SERPL-SCNC: 104 MMOL/L (ref 98–107)
CHLORIDE SERPL-SCNC: 104 MMOL/L (ref 98–107)
CO2 SERPL-SCNC: 24 MMOL/L (ref 22–29)
CO2 SERPL-SCNC: 25 MMOL/L (ref 22–29)
CO2 SERPL-SCNC: 27 MMOL/L (ref 22–29)
CREAT SERPL-MCNC: 1.09 MG/DL (ref 0.76–1.27)
CREAT SERPL-MCNC: 1.13 MG/DL (ref 0.76–1.27)
CREAT SERPL-MCNC: 1.26 MG/DL (ref 0.76–1.27)
D-LACTATE SERPL-SCNC: 1.2 MMOL/L (ref 0.5–2)
DEPRECATED RDW RBC AUTO: 47.8 FL (ref 37–54)
DEPRECATED RDW RBC AUTO: 48.5 FL (ref 37–54)
DEPRECATED RDW RBC AUTO: 49.1 FL (ref 37–54)
EOSINOPHIL # BLD AUTO: 0.01 10*3/MM3 (ref 0–0.4)
EOSINOPHIL # BLD AUTO: 0.03 10*3/MM3 (ref 0–0.4)
EOSINOPHIL # BLD AUTO: 0.1 10*3/MM3 (ref 0–0.4)
EOSINOPHIL NFR BLD AUTO: 0.1 % (ref 0.3–6.2)
EOSINOPHIL NFR BLD AUTO: 0.2 % (ref 0.3–6.2)
EOSINOPHIL NFR BLD AUTO: 0.9 % (ref 0.3–6.2)
ERYTHROCYTE [DISTWIDTH] IN BLOOD BY AUTOMATED COUNT: 12.8 % (ref 12.3–15.4)
ERYTHROCYTE [DISTWIDTH] IN BLOOD BY AUTOMATED COUNT: 13.1 % (ref 12.3–15.4)
ERYTHROCYTE [DISTWIDTH] IN BLOOD BY AUTOMATED COUNT: 13.1 % (ref 12.3–15.4)
GFR SERPL CREATININE-BSD FRML MDRD: 54 ML/MIN/1.73
GFR SERPL CREATININE-BSD FRML MDRD: 61 ML/MIN/1.73
GFR SERPL CREATININE-BSD FRML MDRD: 63 ML/MIN/1.73
GLOBULIN UR ELPH-MCNC: 3.3 GM/DL
GLOBULIN UR ELPH-MCNC: 3.5 GM/DL
GLUCOSE SERPL-MCNC: 101 MG/DL (ref 65–99)
GLUCOSE SERPL-MCNC: 125 MG/DL (ref 65–99)
GLUCOSE SERPL-MCNC: 85 MG/DL (ref 65–99)
HCT VFR BLD AUTO: 32.7 % (ref 37.5–51)
HCT VFR BLD AUTO: 34.7 % (ref 37.5–51)
HCT VFR BLD AUTO: 34.7 % (ref 37.5–51)
HCT VFR BLD AUTO: 35.9 % (ref 37.5–51)
HCT VFR BLD AUTO: 36.3 % (ref 37.5–51)
HCT VFR BLD AUTO: 38.1 % (ref 37.5–51)
HGB BLD-MCNC: 11 G/DL (ref 13–17.7)
HGB BLD-MCNC: 11.4 G/DL (ref 13–17.7)
HGB BLD-MCNC: 11.4 G/DL (ref 13–17.7)
HGB BLD-MCNC: 11.7 G/DL (ref 13–17.7)
HGB BLD-MCNC: 11.9 G/DL (ref 13–17.7)
HGB BLD-MCNC: 12.5 G/DL (ref 13–17.7)
HOLD SPECIMEN: NORMAL
HOLD SPECIMEN: NORMAL
IMM GRANULOCYTES # BLD AUTO: 0.03 10*3/MM3 (ref 0–0.05)
IMM GRANULOCYTES # BLD AUTO: 0.03 10*3/MM3 (ref 0–0.05)
IMM GRANULOCYTES # BLD AUTO: 0.06 10*3/MM3 (ref 0–0.05)
IMM GRANULOCYTES NFR BLD AUTO: 0.2 % (ref 0–0.5)
IMM GRANULOCYTES NFR BLD AUTO: 0.3 % (ref 0–0.5)
IMM GRANULOCYTES NFR BLD AUTO: 0.5 % (ref 0–0.5)
INR PPP: 1.36 (ref 0.85–1.16)
LYMPHOCYTES # BLD AUTO: 0.76 10*3/MM3 (ref 0.7–3.1)
LYMPHOCYTES # BLD AUTO: 0.79 10*3/MM3 (ref 0.7–3.1)
LYMPHOCYTES # BLD AUTO: 0.9 10*3/MM3 (ref 0.7–3.1)
LYMPHOCYTES NFR BLD AUTO: 6.3 % (ref 19.6–45.3)
LYMPHOCYTES NFR BLD AUTO: 6.3 % (ref 19.6–45.3)
LYMPHOCYTES NFR BLD AUTO: 7.7 % (ref 19.6–45.3)
MAGNESIUM SERPL-MCNC: 1.6 MG/DL (ref 1.7–2.3)
MCH RBC QN AUTO: 33 PG (ref 26.6–33)
MCH RBC QN AUTO: 33.4 PG (ref 26.6–33)
MCH RBC QN AUTO: 33.4 PG (ref 26.6–33)
MCHC RBC AUTO-ENTMCNC: 32.8 G/DL (ref 31.5–35.7)
MCHC RBC AUTO-ENTMCNC: 32.8 G/DL (ref 31.5–35.7)
MCHC RBC AUTO-ENTMCNC: 32.9 G/DL (ref 31.5–35.7)
MCV RBC AUTO: 100.5 FL (ref 79–97)
MCV RBC AUTO: 101.8 FL (ref 79–97)
MCV RBC AUTO: 102 FL (ref 79–97)
MONOCYTES # BLD AUTO: 1.37 10*3/MM3 (ref 0.1–0.9)
MONOCYTES # BLD AUTO: 1.49 10*3/MM3 (ref 0.1–0.9)
MONOCYTES # BLD AUTO: 1.61 10*3/MM3 (ref 0.1–0.9)
MONOCYTES NFR BLD AUTO: 11.3 % (ref 5–12)
MONOCYTES NFR BLD AUTO: 12.8 % (ref 5–12)
MONOCYTES NFR BLD AUTO: 12.9 % (ref 5–12)
NEUTROPHILS NFR BLD AUTO: 10 10*3/MM3 (ref 1.7–7)
NEUTROPHILS NFR BLD AUTO: 78 % (ref 42.7–76)
NEUTROPHILS NFR BLD AUTO: 80 % (ref 42.7–76)
NEUTROPHILS NFR BLD AUTO: 81.8 % (ref 42.7–76)
NEUTROPHILS NFR BLD AUTO: 9.08 10*3/MM3 (ref 1.7–7)
NEUTROPHILS NFR BLD AUTO: 9.88 10*3/MM3 (ref 1.7–7)
NRBC BLD AUTO-RTO: 0 /100 WBC (ref 0–0.2)
PLATELET # BLD AUTO: 143 10*3/MM3 (ref 140–450)
PLATELET # BLD AUTO: 154 10*3/MM3 (ref 140–450)
PLATELET # BLD AUTO: 161 10*3/MM3 (ref 140–450)
PMV BLD AUTO: 10.9 FL (ref 6–12)
PMV BLD AUTO: 11 FL (ref 6–12)
PMV BLD AUTO: 11.3 FL (ref 6–12)
POTASSIUM SERPL-SCNC: 3.9 MMOL/L (ref 3.5–5.2)
POTASSIUM SERPL-SCNC: 4.3 MMOL/L (ref 3.5–5.2)
POTASSIUM SERPL-SCNC: 4.4 MMOL/L (ref 3.5–5.2)
PROCALCITONIN SERPL-MCNC: 0.11 NG/ML (ref 0–0.25)
PROT SERPL-MCNC: 6.5 G/DL (ref 6–8.5)
PROT SERPL-MCNC: 7.3 G/DL (ref 6–8.5)
PROTHROMBIN TIME: 16.5 SECONDS (ref 11.5–14)
RBC # BLD AUTO: 3.41 10*6/MM3 (ref 4.14–5.8)
RBC # BLD AUTO: 3.56 10*6/MM3 (ref 4.14–5.8)
RBC # BLD AUTO: 3.79 10*6/MM3 (ref 4.14–5.8)
RH BLD: POSITIVE
SARS-COV-2 RDRP RESP QL NAA+PROBE: NOT DETECTED
SODIUM SERPL-SCNC: 137 MMOL/L (ref 136–145)
SODIUM SERPL-SCNC: 139 MMOL/L (ref 136–145)
SODIUM SERPL-SCNC: 141 MMOL/L (ref 136–145)
T&S EXPIRATION DATE: NORMAL
WBC # BLD AUTO: 11.63 10*3/MM3 (ref 3.4–10.8)
WBC # BLD AUTO: 12.1 10*3/MM3 (ref 3.4–10.8)
WBC # BLD AUTO: 12.5 10*3/MM3 (ref 3.4–10.8)
WHOLE BLOOD HOLD SPECIMEN: NORMAL
WHOLE BLOOD HOLD SPECIMEN: NORMAL

## 2020-09-24 PROCEDURE — C9803 HOPD COVID-19 SPEC COLLECT: HCPCS

## 2020-09-24 PROCEDURE — 84145 PROCALCITONIN (PCT): CPT | Performed by: NURSE PRACTITIONER

## 2020-09-24 PROCEDURE — 85610 PROTHROMBIN TIME: CPT | Performed by: NURSE PRACTITIONER

## 2020-09-24 PROCEDURE — 85014 HEMATOCRIT: CPT | Performed by: NURSE PRACTITIONER

## 2020-09-24 PROCEDURE — 94799 UNLISTED PULMONARY SVC/PX: CPT

## 2020-09-24 PROCEDURE — 99235 HOSP IP/OBS SAME DATE MOD 70: CPT | Performed by: INTERNAL MEDICINE

## 2020-09-24 PROCEDURE — 80053 COMPREHEN METABOLIC PANEL: CPT | Performed by: NURSE PRACTITIONER

## 2020-09-24 PROCEDURE — 99284 EMERGENCY DEPT VISIT MOD MDM: CPT

## 2020-09-24 PROCEDURE — 87635 SARS-COV-2 COVID-19 AMP PRB: CPT | Performed by: NURSE PRACTITIONER

## 2020-09-24 PROCEDURE — 25010000003 MAGNESIUM SULFATE 4 GM/100ML SOLUTION: Performed by: NURSE PRACTITIONER

## 2020-09-24 PROCEDURE — 70490 CT SOFT TISSUE NECK W/O DYE: CPT

## 2020-09-24 PROCEDURE — 85730 THROMBOPLASTIN TIME PARTIAL: CPT | Performed by: NURSE PRACTITIONER

## 2020-09-24 PROCEDURE — G0378 HOSPITAL OBSERVATION PER HR: HCPCS

## 2020-09-24 PROCEDURE — 25010000002 PIPERACILLIN SOD-TAZOBACTAM PER 1 G: Performed by: NURSE PRACTITIONER

## 2020-09-24 PROCEDURE — 87040 BLOOD CULTURE FOR BACTERIA: CPT | Performed by: NURSE PRACTITIONER

## 2020-09-24 PROCEDURE — 85018 HEMOGLOBIN: CPT | Performed by: NURSE PRACTITIONER

## 2020-09-24 PROCEDURE — P9612 CATHETERIZE FOR URINE SPEC: HCPCS

## 2020-09-24 PROCEDURE — 96374 THER/PROPH/DIAG INJ IV PUSH: CPT

## 2020-09-24 PROCEDURE — 83735 ASSAY OF MAGNESIUM: CPT | Performed by: NURSE PRACTITIONER

## 2020-09-24 PROCEDURE — 87635 SARS-COV-2 COVID-19 AMP PRB: CPT | Performed by: FAMILY MEDICINE

## 2020-09-24 PROCEDURE — 99220 PR INITIAL OBSERVATION CARE/DAY 70 MINUTES: CPT | Performed by: NURSE PRACTITIONER

## 2020-09-24 PROCEDURE — 83605 ASSAY OF LACTIC ACID: CPT | Performed by: NURSE PRACTITIONER

## 2020-09-24 PROCEDURE — 85025 COMPLETE CBC W/AUTO DIFF WBC: CPT | Performed by: EMERGENCY MEDICINE

## 2020-09-24 PROCEDURE — 85025 COMPLETE CBC W/AUTO DIFF WBC: CPT | Performed by: NURSE PRACTITIONER

## 2020-09-24 PROCEDURE — 25010000002 DEXAMETHASONE SODIUM PHOSPHATE 20 MG/5ML SOLUTION: Performed by: EMERGENCY MEDICINE

## 2020-09-24 RX ORDER — AMOXICILLIN 875 MG/1
875 TABLET, COATED ORAL EVERY 12 HOURS SCHEDULED
Status: DISCONTINUED | OUTPATIENT
Start: 2020-09-24 | End: 2020-09-26 | Stop reason: HOSPADM

## 2020-09-24 RX ORDER — TAMSULOSIN HYDROCHLORIDE 0.4 MG/1
0.4 CAPSULE ORAL NIGHTLY
Status: DISCONTINUED | OUTPATIENT
Start: 2020-09-24 | End: 2020-09-24 | Stop reason: HOSPADM

## 2020-09-24 RX ORDER — ONDANSETRON 2 MG/ML
4 INJECTION INTRAMUSCULAR; INTRAVENOUS EVERY 6 HOURS PRN
Status: DISCONTINUED | OUTPATIENT
Start: 2020-09-24 | End: 2020-09-26 | Stop reason: HOSPADM

## 2020-09-24 RX ORDER — PANTOPRAZOLE SODIUM 40 MG/1
40 TABLET, DELAYED RELEASE ORAL DAILY
Status: DISCONTINUED | OUTPATIENT
Start: 2020-09-24 | End: 2020-09-24 | Stop reason: HOSPADM

## 2020-09-24 RX ORDER — DEXAMETHASONE SODIUM PHOSPHATE 4 MG/ML
4 INJECTION, SOLUTION INTRA-ARTICULAR; INTRALESIONAL; INTRAMUSCULAR; INTRAVENOUS; SOFT TISSUE DAILY
Status: DISCONTINUED | OUTPATIENT
Start: 2020-09-25 | End: 2020-09-25

## 2020-09-24 RX ORDER — MAGNESIUM SULFATE HEPTAHYDRATE 40 MG/ML
4 INJECTION, SOLUTION INTRAVENOUS AS NEEDED
Status: DISCONTINUED | OUTPATIENT
Start: 2020-09-24 | End: 2020-09-24 | Stop reason: HOSPADM

## 2020-09-24 RX ORDER — PANTOPRAZOLE SODIUM 40 MG/1
40 TABLET, DELAYED RELEASE ORAL DAILY
Status: DISCONTINUED | OUTPATIENT
Start: 2020-09-25 | End: 2020-09-26 | Stop reason: HOSPADM

## 2020-09-24 RX ORDER — SODIUM CHLORIDE 0.9 % (FLUSH) 0.9 %
10 SYRINGE (ML) INJECTION AS NEEDED
Status: DISCONTINUED | OUTPATIENT
Start: 2020-09-24 | End: 2020-09-24 | Stop reason: HOSPADM

## 2020-09-24 RX ORDER — SODIUM CHLORIDE 0.9 % (FLUSH) 0.9 %
10 SYRINGE (ML) INJECTION AS NEEDED
Status: DISCONTINUED | OUTPATIENT
Start: 2020-09-24 | End: 2020-09-26 | Stop reason: HOSPADM

## 2020-09-24 RX ORDER — SODIUM CHLORIDE 9 MG/ML
50 INJECTION, SOLUTION INTRAVENOUS ONCE
Status: COMPLETED | OUTPATIENT
Start: 2020-09-24 | End: 2020-09-24

## 2020-09-24 RX ORDER — ONDANSETRON 4 MG/1
4 TABLET, FILM COATED ORAL EVERY 6 HOURS PRN
Status: DISCONTINUED | OUTPATIENT
Start: 2020-09-24 | End: 2020-09-26 | Stop reason: HOSPADM

## 2020-09-24 RX ORDER — ACETAMINOPHEN 325 MG/1
650 TABLET ORAL EVERY 6 HOURS PRN
Status: DISCONTINUED | OUTPATIENT
Start: 2020-09-24 | End: 2020-09-26 | Stop reason: HOSPADM

## 2020-09-24 RX ORDER — DEXAMETHASONE IN 0.9 % SOD CHL 10 MG/50ML
10 INTRAVENOUS SOLUTION, PIGGYBACK (ML) INTRAVENOUS ONCE
Status: COMPLETED | OUTPATIENT
Start: 2020-09-24 | End: 2020-09-24

## 2020-09-24 RX ORDER — L.ACID,PARA/B.BIFIDUM/S.THERM 8B CELL
1 CAPSULE ORAL DAILY
Status: DISCONTINUED | OUTPATIENT
Start: 2020-09-25 | End: 2020-09-26 | Stop reason: HOSPADM

## 2020-09-24 RX ORDER — SODIUM CHLORIDE 0.9 % (FLUSH) 0.9 %
10 SYRINGE (ML) INJECTION EVERY 12 HOURS SCHEDULED
Status: DISCONTINUED | OUTPATIENT
Start: 2020-09-24 | End: 2020-09-24 | Stop reason: HOSPADM

## 2020-09-24 RX ORDER — SODIUM CHLORIDE 0.9 % (FLUSH) 0.9 %
10 SYRINGE (ML) INJECTION EVERY 12 HOURS SCHEDULED
Status: DISCONTINUED | OUTPATIENT
Start: 2020-09-24 | End: 2020-09-26 | Stop reason: HOSPADM

## 2020-09-24 RX ORDER — ATORVASTATIN CALCIUM 40 MG/1
80 TABLET, FILM COATED ORAL NIGHTLY
Status: DISCONTINUED | OUTPATIENT
Start: 2020-09-24 | End: 2020-09-26 | Stop reason: HOSPADM

## 2020-09-24 RX ORDER — LOSARTAN POTASSIUM 25 MG/1
12.5 TABLET ORAL DAILY
Status: DISCONTINUED | OUTPATIENT
Start: 2020-09-25 | End: 2020-09-26 | Stop reason: HOSPADM

## 2020-09-24 RX ORDER — ACETAMINOPHEN 325 MG/1
650 TABLET ORAL EVERY 6 HOURS PRN
Status: DISCONTINUED | OUTPATIENT
Start: 2020-09-24 | End: 2020-09-24 | Stop reason: HOSPADM

## 2020-09-24 RX ORDER — MAGNESIUM SULFATE HEPTAHYDRATE 40 MG/ML
2 INJECTION, SOLUTION INTRAVENOUS AS NEEDED
Status: DISCONTINUED | OUTPATIENT
Start: 2020-09-24 | End: 2020-09-24 | Stop reason: HOSPADM

## 2020-09-24 RX ORDER — TAMSULOSIN HYDROCHLORIDE 0.4 MG/1
0.4 CAPSULE ORAL DAILY
Status: DISCONTINUED | OUTPATIENT
Start: 2020-09-25 | End: 2020-09-26 | Stop reason: HOSPADM

## 2020-09-24 RX ORDER — ATORVASTATIN CALCIUM 40 MG/1
80 TABLET, FILM COATED ORAL NIGHTLY
Status: DISCONTINUED | OUTPATIENT
Start: 2020-09-24 | End: 2020-09-24 | Stop reason: HOSPADM

## 2020-09-24 RX ORDER — L.ACID,PARA/B.BIFIDUM/S.THERM 8B CELL
1 CAPSULE ORAL DAILY
Status: DISCONTINUED | OUTPATIENT
Start: 2020-09-24 | End: 2020-09-24 | Stop reason: HOSPADM

## 2020-09-24 RX ORDER — LOSARTAN POTASSIUM 25 MG/1
12.5 TABLET ORAL DAILY
Status: DISCONTINUED | OUTPATIENT
Start: 2020-09-24 | End: 2020-09-24 | Stop reason: HOSPADM

## 2020-09-24 RX ADMIN — SODIUM CHLORIDE 500 ML: 9 INJECTION, SOLUTION INTRAVENOUS at 16:27

## 2020-09-24 RX ADMIN — Medication 10 ML: at 09:00

## 2020-09-24 RX ADMIN — MAGNESIUM SULFATE HEPTAHYDRATE 4 G: 40 INJECTION, SOLUTION INTRAVENOUS at 09:34

## 2020-09-24 RX ADMIN — SODIUM CHLORIDE, PRESERVATIVE FREE 10 ML: 5 INJECTION INTRAVENOUS at 21:09

## 2020-09-24 RX ADMIN — ATORVASTATIN CALCIUM 80 MG: 40 TABLET, FILM COATED ORAL at 21:08

## 2020-09-24 RX ADMIN — TAZOBACTAM SODIUM AND PIPERACILLIN SODIUM 3.38 G: 375; 3 INJECTION, SOLUTION INTRAVENOUS at 05:10

## 2020-09-24 RX ADMIN — TAZOBACTAM SODIUM AND PIPERACILLIN SODIUM 3.38 G: 375; 3 INJECTION, SOLUTION INTRAVENOUS at 11:07

## 2020-09-24 RX ADMIN — AMOXICILLIN 875 MG: 875 TABLET, FILM COATED ORAL at 21:08

## 2020-09-24 RX ADMIN — DEXAMETHASONE SODIUM PHOSPHATE 10 MG: 4 INJECTION, SOLUTION INTRA-ARTICULAR; INTRALESIONAL; INTRAMUSCULAR; INTRAVENOUS; SOFT TISSUE at 16:29

## 2020-09-24 RX ADMIN — SODIUM CHLORIDE 50 ML/HR: 9 INJECTION, SOLUTION INTRAVENOUS at 21:11

## 2020-09-25 LAB
ABO GROUP BLD: NORMAL
ALBUMIN SERPL-MCNC: 3.1 G/DL (ref 3.5–5.2)
ALBUMIN/GLOB SERPL: 1 G/DL
ALP SERPL-CCNC: 537 U/L (ref 39–117)
ALT SERPL W P-5'-P-CCNC: 64 U/L (ref 1–41)
ANION GAP SERPL CALCULATED.3IONS-SCNC: 10 MMOL/L (ref 5–15)
AST SERPL-CCNC: 60 U/L (ref 1–40)
BACTERIA UR QL AUTO: ABNORMAL /HPF
BASOPHILS # BLD AUTO: 0.01 10*3/MM3 (ref 0–0.2)
BASOPHILS NFR BLD AUTO: 0.2 % (ref 0–1.5)
BILIRUB SERPL-MCNC: 0.8 MG/DL (ref 0–1.2)
BILIRUB UR QL STRIP: NEGATIVE
BLD GP AB SCN SERPL QL: NEGATIVE
BUN SERPL-MCNC: 28 MG/DL (ref 8–23)
BUN/CREAT SERPL: 30.8 (ref 7–25)
CALCIUM SPEC-SCNC: 8.8 MG/DL (ref 8.2–9.6)
CHLORIDE SERPL-SCNC: 104 MMOL/L (ref 98–107)
CLARITY UR: CLEAR
CO2 SERPL-SCNC: 22 MMOL/L (ref 22–29)
COLOR UR: YELLOW
CREAT SERPL-MCNC: 0.91 MG/DL (ref 0.76–1.27)
DEPRECATED RDW RBC AUTO: 48.3 FL (ref 37–54)
EOSINOPHIL # BLD AUTO: 0.15 10*3/MM3 (ref 0–0.4)
EOSINOPHIL NFR BLD AUTO: 2.5 % (ref 0.3–6.2)
ERYTHROCYTE [DISTWIDTH] IN BLOOD BY AUTOMATED COUNT: 12.8 % (ref 12.3–15.4)
GFR SERPL CREATININE-BSD FRML MDRD: 78 ML/MIN/1.73
GGT SERPL-CCNC: 271 U/L (ref 8–61)
GLOBULIN UR ELPH-MCNC: 3.2 GM/DL
GLUCOSE SERPL-MCNC: 116 MG/DL (ref 65–99)
GLUCOSE UR STRIP-MCNC: NEGATIVE MG/DL
HCT VFR BLD AUTO: 33.1 % (ref 37.5–51)
HCT VFR BLD AUTO: 34.3 % (ref 37.5–51)
HGB BLD-MCNC: 10.9 G/DL (ref 13–17.7)
HGB BLD-MCNC: 11.2 G/DL (ref 13–17.7)
HGB UR QL STRIP.AUTO: NEGATIVE
HYALINE CASTS UR QL AUTO: ABNORMAL /LPF
IMM GRANULOCYTES # BLD AUTO: 0.06 10*3/MM3 (ref 0–0.05)
IMM GRANULOCYTES NFR BLD AUTO: 1 % (ref 0–0.5)
KETONES UR QL STRIP: ABNORMAL
LEUKOCYTE ESTERASE UR QL STRIP.AUTO: ABNORMAL
LYMPHOCYTES # BLD AUTO: 0.59 10*3/MM3 (ref 0.7–3.1)
LYMPHOCYTES NFR BLD AUTO: 9.8 % (ref 19.6–45.3)
MAGNESIUM SERPL-MCNC: 2.2 MG/DL (ref 1.7–2.3)
MCH RBC QN AUTO: 33.1 PG (ref 26.6–33)
MCHC RBC AUTO-ENTMCNC: 32.7 G/DL (ref 31.5–35.7)
MCV RBC AUTO: 101.5 FL (ref 79–97)
MONOCYTES # BLD AUTO: 0.31 10*3/MM3 (ref 0.1–0.9)
MONOCYTES NFR BLD AUTO: 5.2 % (ref 5–12)
NEUTROPHILS NFR BLD AUTO: 4.88 10*3/MM3 (ref 1.7–7)
NEUTROPHILS NFR BLD AUTO: 81.3 % (ref 42.7–76)
NITRITE UR QL STRIP: POSITIVE
NRBC BLD AUTO-RTO: 0 /100 WBC (ref 0–0.2)
PH UR STRIP.AUTO: 6.5 [PH] (ref 5–8)
PLAT MORPH BLD: NORMAL
PLATELET # BLD AUTO: 147 10*3/MM3 (ref 140–450)
PMV BLD AUTO: 10.7 FL (ref 6–12)
POTASSIUM SERPL-SCNC: 3.9 MMOL/L (ref 3.5–5.2)
PROT SERPL-MCNC: 6.3 G/DL (ref 6–8.5)
PROT UR QL STRIP: NEGATIVE
RBC # BLD AUTO: 3.38 10*6/MM3 (ref 4.14–5.8)
RBC # UR: ABNORMAL /HPF
RBC MORPH BLD: NORMAL
REF LAB TEST METHOD: ABNORMAL
RH BLD: POSITIVE
SARS-COV-2 RDRP RESP QL NAA+PROBE: NOT DETECTED
SODIUM SERPL-SCNC: 136 MMOL/L (ref 136–145)
SP GR UR STRIP: 1.01 (ref 1–1.03)
SQUAMOUS #/AREA URNS HPF: ABNORMAL /HPF
T&S EXPIRATION DATE: NORMAL
UROBILINOGEN UR QL STRIP: ABNORMAL
WBC # BLD AUTO: 6 10*3/MM3 (ref 3.4–10.8)
WBC MORPH BLD: NORMAL
WBC UR QL AUTO: ABNORMAL /HPF

## 2020-09-25 PROCEDURE — 99226 PR SBSQ OBSERVATION CARE/DAY 35 MINUTES: CPT | Performed by: INTERNAL MEDICINE

## 2020-09-25 PROCEDURE — 96376 TX/PRO/DX INJ SAME DRUG ADON: CPT

## 2020-09-25 PROCEDURE — 85007 BL SMEAR W/DIFF WBC COUNT: CPT | Performed by: NURSE PRACTITIONER

## 2020-09-25 PROCEDURE — 97162 PT EVAL MOD COMPLEX 30 MIN: CPT

## 2020-09-25 PROCEDURE — 82977 ASSAY OF GGT: CPT | Performed by: INTERNAL MEDICINE

## 2020-09-25 PROCEDURE — 86850 RBC ANTIBODY SCREEN: CPT | Performed by: NURSE PRACTITIONER

## 2020-09-25 PROCEDURE — 25010000002 DEXAMETHASONE PER 1 MG: Performed by: NURSE PRACTITIONER

## 2020-09-25 PROCEDURE — 63710000001 DEXAMETHASONE PER 0.25 MG: Performed by: INTERNAL MEDICINE

## 2020-09-25 PROCEDURE — 81001 URINALYSIS AUTO W/SCOPE: CPT | Performed by: NURSE PRACTITIONER

## 2020-09-25 PROCEDURE — G0378 HOSPITAL OBSERVATION PER HR: HCPCS

## 2020-09-25 PROCEDURE — 85025 COMPLETE CBC W/AUTO DIFF WBC: CPT | Performed by: NURSE PRACTITIONER

## 2020-09-25 PROCEDURE — 85018 HEMOGLOBIN: CPT | Performed by: NURSE PRACTITIONER

## 2020-09-25 PROCEDURE — 86901 BLOOD TYPING SEROLOGIC RH(D): CPT | Performed by: NURSE PRACTITIONER

## 2020-09-25 PROCEDURE — 83735 ASSAY OF MAGNESIUM: CPT | Performed by: NURSE PRACTITIONER

## 2020-09-25 PROCEDURE — 80053 COMPREHEN METABOLIC PANEL: CPT | Performed by: NURSE PRACTITIONER

## 2020-09-25 PROCEDURE — 86900 BLOOD TYPING SEROLOGIC ABO: CPT | Performed by: NURSE PRACTITIONER

## 2020-09-25 PROCEDURE — 85014 HEMATOCRIT: CPT | Performed by: NURSE PRACTITIONER

## 2020-09-25 RX ORDER — DEXAMETHASONE 4 MG/1
4 TABLET ORAL
Status: DISCONTINUED | OUTPATIENT
Start: 2020-09-25 | End: 2020-09-26 | Stop reason: HOSPADM

## 2020-09-25 RX ADMIN — DEXAMETHASONE SODIUM PHOSPHATE 4 MG: 4 INJECTION, SOLUTION INTRAMUSCULAR; INTRAVENOUS at 08:05

## 2020-09-25 RX ADMIN — AMOXICILLIN 875 MG: 875 TABLET, FILM COATED ORAL at 08:08

## 2020-09-25 RX ADMIN — PANTOPRAZOLE SODIUM 40 MG: 40 TABLET, DELAYED RELEASE ORAL at 08:05

## 2020-09-25 RX ADMIN — APIXABAN 2.5 MG: 2.5 TABLET, FILM COATED ORAL at 20:07

## 2020-09-25 RX ADMIN — ATORVASTATIN CALCIUM 80 MG: 40 TABLET, FILM COATED ORAL at 20:06

## 2020-09-25 RX ADMIN — AMOXICILLIN 875 MG: 875 TABLET, FILM COATED ORAL at 20:06

## 2020-09-25 RX ADMIN — SODIUM CHLORIDE, PRESERVATIVE FREE 10 ML: 5 INJECTION INTRAVENOUS at 08:06

## 2020-09-25 RX ADMIN — SODIUM CHLORIDE, PRESERVATIVE FREE 10 ML: 5 INJECTION INTRAVENOUS at 20:07

## 2020-09-25 RX ADMIN — TAMSULOSIN HYDROCHLORIDE 0.4 MG: 0.4 CAPSULE ORAL at 08:05

## 2020-09-25 RX ADMIN — LOSARTAN POTASSIUM 12.5 MG: 25 TABLET, FILM COATED ORAL at 08:07

## 2020-09-25 RX ADMIN — Medication 1 CAPSULE: at 08:05

## 2020-09-25 RX ADMIN — DEXAMETHASONE 4 MG: 4 TABLET ORAL at 08:33

## 2020-09-26 VITALS
HEIGHT: 71 IN | WEIGHT: 163.6 LBS | HEART RATE: 80 BPM | SYSTOLIC BLOOD PRESSURE: 120 MMHG | TEMPERATURE: 98 F | BODY MASS INDEX: 22.9 KG/M2 | RESPIRATION RATE: 16 BRPM | OXYGEN SATURATION: 94 % | DIASTOLIC BLOOD PRESSURE: 80 MMHG

## 2020-09-26 LAB
DEPRECATED RDW RBC AUTO: 48.1 FL (ref 37–54)
ERYTHROCYTE [DISTWIDTH] IN BLOOD BY AUTOMATED COUNT: 12.8 % (ref 12.3–15.4)
HCT VFR BLD AUTO: 30.7 % (ref 37.5–51)
HGB BLD-MCNC: 10 G/DL (ref 13–17.7)
MCH RBC QN AUTO: 33.4 PG (ref 26.6–33)
MCHC RBC AUTO-ENTMCNC: 32.6 G/DL (ref 31.5–35.7)
MCV RBC AUTO: 102.7 FL (ref 79–97)
PLATELET # BLD AUTO: 161 10*3/MM3 (ref 140–450)
PMV BLD AUTO: 11 FL (ref 6–12)
RBC # BLD AUTO: 2.99 10*6/MM3 (ref 4.14–5.8)
WBC # BLD AUTO: 12.63 10*3/MM3 (ref 3.4–10.8)

## 2020-09-26 PROCEDURE — 99217 PR OBSERVATION CARE DISCHARGE MANAGEMENT: CPT | Performed by: INTERNAL MEDICINE

## 2020-09-26 PROCEDURE — G0378 HOSPITAL OBSERVATION PER HR: HCPCS

## 2020-09-26 PROCEDURE — 63710000001 DEXAMETHASONE PER 0.25 MG: Performed by: INTERNAL MEDICINE

## 2020-09-26 PROCEDURE — 85027 COMPLETE CBC AUTOMATED: CPT | Performed by: INTERNAL MEDICINE

## 2020-09-26 RX ORDER — DEXAMETHASONE 4 MG/1
4 TABLET ORAL
Qty: 2 TABLET | Refills: 0 | Status: SHIPPED | OUTPATIENT
Start: 2020-09-26 | End: 2020-09-28

## 2020-09-26 RX ADMIN — TAMSULOSIN HYDROCHLORIDE 0.4 MG: 0.4 CAPSULE ORAL at 08:17

## 2020-09-26 RX ADMIN — Medication 1 CAPSULE: at 08:17

## 2020-09-26 RX ADMIN — LOSARTAN POTASSIUM 12.5 MG: 25 TABLET, FILM COATED ORAL at 08:16

## 2020-09-26 RX ADMIN — DEXAMETHASONE 4 MG: 4 TABLET ORAL at 08:17

## 2020-09-26 RX ADMIN — AMOXICILLIN 875 MG: 875 TABLET, FILM COATED ORAL at 08:17

## 2020-09-26 RX ADMIN — APIXABAN 2.5 MG: 2.5 TABLET, FILM COATED ORAL at 08:17

## 2020-09-26 RX ADMIN — SODIUM CHLORIDE, PRESERVATIVE FREE 10 ML: 5 INJECTION INTRAVENOUS at 08:17

## 2020-09-26 RX ADMIN — PANTOPRAZOLE SODIUM 40 MG: 40 TABLET, DELAYED RELEASE ORAL at 08:17

## 2020-09-29 LAB
BACTERIA SPEC AEROBE CULT: NORMAL
BACTERIA SPEC AEROBE CULT: NORMAL

## 2020-10-23 ENCOUNTER — HOSPITAL ENCOUNTER (EMERGENCY)
Facility: HOSPITAL | Age: 85
Discharge: HOME | End: 2020-10-23
Attending: EMERGENCY MEDICINE | Admitting: EMERGENCY MEDICINE

## 2020-10-23 VITALS
SYSTOLIC BLOOD PRESSURE: 102 MMHG | HEIGHT: 71 IN | RESPIRATION RATE: 21 BRPM | WEIGHT: 166 LBS | HEART RATE: 91 BPM | OXYGEN SATURATION: 94 % | BODY MASS INDEX: 23.24 KG/M2 | DIASTOLIC BLOOD PRESSURE: 82 MMHG | TEMPERATURE: 100 F

## 2020-10-23 DIAGNOSIS — N32.0 BLADDER OUTLET OBSTRUCTION: ICD-10-CM

## 2020-10-23 DIAGNOSIS — N39.0 ACUTE UTI: Primary | ICD-10-CM

## 2020-10-23 LAB
ALBUMIN SERPL-MCNC: 3.1 G/DL (ref 3.5–5.2)
ALBUMIN/GLOB SERPL: 0.8 G/DL
ALP SERPL-CCNC: 376 U/L (ref 39–117)
ALT SERPL W P-5'-P-CCNC: 23 U/L (ref 1–41)
ANION GAP SERPL CALCULATED.3IONS-SCNC: 11 MMOL/L (ref 5–15)
AST SERPL-CCNC: 32 U/L (ref 1–40)
BACTERIA UR QL AUTO: ABNORMAL /HPF
BASOPHILS # BLD AUTO: 0.06 10*3/MM3 (ref 0–0.2)
BASOPHILS NFR BLD AUTO: 0.4 % (ref 0–1.5)
BILIRUB SERPL-MCNC: 0.8 MG/DL (ref 0–1.2)
BILIRUB UR QL STRIP: NEGATIVE
BUN SERPL-MCNC: 23 MG/DL (ref 8–23)
BUN/CREAT SERPL: 18.4 (ref 7–25)
CALCIUM SPEC-SCNC: 8.9 MG/DL (ref 8.2–9.6)
CHLORIDE SERPL-SCNC: 99 MMOL/L (ref 98–107)
CLARITY UR: ABNORMAL
CO2 SERPL-SCNC: 25 MMOL/L (ref 22–29)
COLOR UR: YELLOW
CREAT SERPL-MCNC: 1.25 MG/DL (ref 0.76–1.27)
DEPRECATED RDW RBC AUTO: 43.9 FL (ref 37–54)
EOSINOPHIL # BLD AUTO: 0.18 10*3/MM3 (ref 0–0.4)
EOSINOPHIL NFR BLD AUTO: 1.3 % (ref 0.3–6.2)
ERYTHROCYTE [DISTWIDTH] IN BLOOD BY AUTOMATED COUNT: 12.2 % (ref 12.3–15.4)
GFR SERPL CREATININE-BSD FRML MDRD: 54 ML/MIN/1.73
GLOBULIN UR ELPH-MCNC: 3.9 GM/DL
GLUCOSE SERPL-MCNC: 126 MG/DL (ref 65–99)
GLUCOSE UR STRIP-MCNC: NEGATIVE MG/DL
HCT VFR BLD AUTO: 35.3 % (ref 37.5–51)
HGB BLD-MCNC: 11.7 G/DL (ref 13–17.7)
HGB UR QL STRIP.AUTO: ABNORMAL
HYALINE CASTS UR QL AUTO: ABNORMAL /LPF
IMM GRANULOCYTES # BLD AUTO: 0.07 10*3/MM3 (ref 0–0.05)
IMM GRANULOCYTES NFR BLD AUTO: 0.5 % (ref 0–0.5)
KETONES UR QL STRIP: NEGATIVE
LEUKOCYTE ESTERASE UR QL STRIP.AUTO: ABNORMAL
LYMPHOCYTES # BLD AUTO: 0.69 10*3/MM3 (ref 0.7–3.1)
LYMPHOCYTES NFR BLD AUTO: 4.8 % (ref 19.6–45.3)
MCH RBC QN AUTO: 32.6 PG (ref 26.6–33)
MCHC RBC AUTO-ENTMCNC: 33.1 G/DL (ref 31.5–35.7)
MCV RBC AUTO: 98.3 FL (ref 79–97)
MONOCYTES # BLD AUTO: 1.49 10*3/MM3 (ref 0.1–0.9)
MONOCYTES NFR BLD AUTO: 10.4 % (ref 5–12)
NEUTROPHILS NFR BLD AUTO: 11.82 10*3/MM3 (ref 1.7–7)
NEUTROPHILS NFR BLD AUTO: 82.6 % (ref 42.7–76)
NITRITE UR QL STRIP: NEGATIVE
NRBC BLD AUTO-RTO: 0 /100 WBC (ref 0–0.2)
NT-PROBNP SERPL-MCNC: 313.2 PG/ML (ref 0–1800)
PH UR STRIP.AUTO: 6.5 [PH] (ref 5–8)
PLATELET # BLD AUTO: 247 10*3/MM3 (ref 140–450)
PMV BLD AUTO: 9.7 FL (ref 6–12)
POTASSIUM SERPL-SCNC: 4.3 MMOL/L (ref 3.5–5.2)
PROT SERPL-MCNC: 7 G/DL (ref 6–8.5)
PROT UR QL STRIP: ABNORMAL
RBC # BLD AUTO: 3.59 10*6/MM3 (ref 4.14–5.8)
RBC # UR: ABNORMAL /HPF
REF LAB TEST METHOD: ABNORMAL
SODIUM SERPL-SCNC: 135 MMOL/L (ref 136–145)
SP GR UR STRIP: 1.02 (ref 1–1.03)
SQUAMOUS #/AREA URNS HPF: ABNORMAL /HPF
UROBILINOGEN UR QL STRIP: ABNORMAL
WBC # BLD AUTO: 14.31 10*3/MM3 (ref 3.4–10.8)
WBC UR QL AUTO: ABNORMAL /HPF

## 2020-10-23 PROCEDURE — 87186 SC STD MICRODIL/AGAR DIL: CPT | Performed by: EMERGENCY MEDICINE

## 2020-10-23 PROCEDURE — 87077 CULTURE AEROBIC IDENTIFY: CPT | Performed by: EMERGENCY MEDICINE

## 2020-10-23 PROCEDURE — 83880 ASSAY OF NATRIURETIC PEPTIDE: CPT | Performed by: EMERGENCY MEDICINE

## 2020-10-23 PROCEDURE — 85025 COMPLETE CBC W/AUTO DIFF WBC: CPT | Performed by: EMERGENCY MEDICINE

## 2020-10-23 PROCEDURE — 25010000002 CEFTRIAXONE PER 250 MG: Performed by: EMERGENCY MEDICINE

## 2020-10-23 PROCEDURE — 87086 URINE CULTURE/COLONY COUNT: CPT | Performed by: EMERGENCY MEDICINE

## 2020-10-23 PROCEDURE — 80053 COMPREHEN METABOLIC PANEL: CPT | Performed by: EMERGENCY MEDICINE

## 2020-10-23 PROCEDURE — 99283 EMERGENCY DEPT VISIT LOW MDM: CPT

## 2020-10-23 PROCEDURE — 96365 THER/PROPH/DIAG IV INF INIT: CPT

## 2020-10-23 PROCEDURE — 81001 URINALYSIS AUTO W/SCOPE: CPT | Performed by: EMERGENCY MEDICINE

## 2020-10-23 PROCEDURE — P9612 CATHETERIZE FOR URINE SPEC: HCPCS

## 2020-10-23 RX ORDER — SODIUM CHLORIDE 0.9 % (FLUSH) 0.9 %
10 SYRINGE (ML) INJECTION AS NEEDED
Status: DISCONTINUED | OUTPATIENT
Start: 2020-10-23 | End: 2020-10-23 | Stop reason: HOSPADM

## 2020-10-23 RX ORDER — CEFDINIR 300 MG/1
300 CAPSULE ORAL 2 TIMES DAILY
Qty: 14 CAPSULE | Refills: 0 | Status: SHIPPED | OUTPATIENT
Start: 2020-10-23

## 2020-10-23 RX ADMIN — CEFTRIAXONE SODIUM 1 G: 1 INJECTION, POWDER, FOR SOLUTION INTRAMUSCULAR; INTRAVENOUS at 19:20

## 2020-10-23 NOTE — ED PROVIDER NOTES
EMERGENCY DEPARTMENT ENCOUNTER    Room Number:  06/06  Date of encounter:  10/23/2020  PCP: Daniel Adams MD  Historian: Patient      HPI:  Chief Complaint: Inability to self cath urinary bladder and some discomfort with cathing.        Context: Tho Kohli is a 89 y.o. male who presents to the ED c/o inability to pass a catheter throughout the day today.  The patient has had to self cath for several years secondary to bilateral intracranial hemorrhage.  He states that rubber catheters work better than plastic catheters but that insurance has denied him rubber catheters for some time now.  Today he was unable to catheterize himself and felt discomfort in the suprapubic region.  He denies fevers, chills, nausea and vomiting.  He denies upper abdominal pain and chest pain as well as dyspnea and cough.  Has had no loss of taste or smell.  He rates his discomfort as moderate in severity.    PAST MEDICAL HISTORY  Active Ambulatory Problems     Diagnosis Date Noted   • Essential hypertension 12/09/2019   • Grade III diastolic dysfunction 12/10/2019   • History of dental surgery 09/24/2020   • History of stroke 09/24/2020   • Elevated LFTs 09/24/2020   • Leukocytosis 09/24/2020   • Facial swelling 09/24/2020   • Paroxysmal atrial fibrillation (CMS/[facility]) 09/24/2020   • Hematoma of neck 09/24/2020   • H/O urinary retention 09/24/2020   • Anemia 09/24/2020     Resolved Ambulatory Problems     Diagnosis Date Noted   • Stroke (CMS/[facility]) 12/09/2019   • Dysarthria 12/09/2019   • SALVADOR (acute kidney injury) (CMS/[facility]) 12/09/2019   • Elevated troponin 12/10/2019   • Abnormal EKG 12/10/2019   • Sepsis (CMS/[facility]) 02/05/2020   • Acute UTI (urinary tract infection) 02/05/2020   • Hypomagnesemia 02/06/2020   • Oral bleeding 09/24/2020     Past Medical History:   Diagnosis Date   • Afib (CMS/[facility])    • Arthritis    • CHF (congestive heart failure) (CMS/[facility])    • CVA (cerebral vascular accident) (CMS/[facility])    • Diaphragmatic hernia     • Gait abnormality    • GERD (gastroesophageal reflux disease)    • Hemiplegia (CMS/[facility])    • Hyperlipidemia    • Hypertension    • Insomnia    • OAB (overactive bladder)    • Renal disorder    • Retention of urine    • Subdural hematoma (CMS/[facility])    • UTI (urinary tract infection)          PAST SURGICAL HISTORY  Past Surgical History:   Procedure Laterality Date   • BRAIN SURGERY           FAMILY HISTORY  History reviewed. No pertinent family history.      SOCIAL HISTORY  Social History     Socioeconomic History   • Marital status:      Spouse name: Not on file   • Number of children: Not on file   • Years of education: Not on file   • Highest education level: Not on file   Tobacco Use   • Smoking status: Never Smoker   • Smokeless tobacco: Never Used   Substance and Sexual Activity   • Alcohol use: Not Currently   • Drug use: Never   • Sexual activity: Defer         ALLERGIES  Patient has no known allergies.        REVIEW OF SYSTEMS  Review of Systems     All systems reviewed and negative except for those discussed in HPI.       PHYSICAL EXAM    I have reviewed the triage vital signs and nursing notes.    ED Triage Vitals   Temp Heart Rate Resp BP SpO2   10/23/20 1755 10/23/20 1755 10/23/20 1757 -- 10/23/20 1755   100 °F (37.8 °C) 101 21  94 %      Temp src Heart Rate Source Patient Position BP Location FiO2 (%)   10/23/20 1755 10/23/20 1755 -- -- --   Oral Monitor          Physical Exam  GENERAL: Very pleasant, intelligent 89-year-old gentleman.  Relatively frail.  Appears in no acute distress.  HENT: Nares patent.  Slightly dry mucous membranes.  EYES: No scleral icterus  CV: Regular rhythm, regular rate.  No murmurs gallops rubs.  RESPIRATORY: Normal effort.  No audible wheezes, rales or rhonchi  ABDOMEN: Soft, nontender even throughout the suprapubic region, status post bladder drainage with rubber catheter.  MUSCULOSKELETAL: No deformities.  No CVA tenderness.  NEURO: Alert, moves all extremities,  follows commands  SKIN: Warm, dry, no rash visualized  Genitourinary: Normal male external genitalia, circumcised.      LAB RESULTS  Recent Results (from the past 24 hour(s))   Comprehensive Metabolic Panel    Collection Time: 10/23/20  6:28 PM    Specimen: Blood   Result Value Ref Range    Glucose 126 (H) 65 - 99 mg/dL    BUN 23 8 - 23 mg/dL    Creatinine 1.25 0.76 - 1.27 mg/dL    Sodium 135 (L) 136 - 145 mmol/L    Potassium 4.3 3.5 - 5.2 mmol/L    Chloride 99 98 - 107 mmol/L    CO2 25.0 22.0 - 29.0 mmol/L    Calcium 8.9 8.2 - 9.6 mg/dL    Total Protein 7.0 6.0 - 8.5 g/dL    Albumin 3.10 (L) 3.50 - 5.20 g/dL    ALT (SGPT) 23 1 - 41 U/L    AST (SGOT) 32 1 - 40 U/L    Alkaline Phosphatase 376 (H) 39 - 117 U/L    Total Bilirubin 0.8 0.0 - 1.2 mg/dL    eGFR Non African Amer 54 (L) >60 mL/min/1.73    Globulin 3.9 gm/dL    A/G Ratio 0.8 g/dL    BUN/Creatinine Ratio 18.4 7.0 - 25.0    Anion Gap 11.0 5.0 - 15.0 mmol/L   BNP    Collection Time: 10/23/20  6:28 PM    Specimen: Blood   Result Value Ref Range    proBNP 313.2 0.0-1,800.0 pg/mL   CBC Auto Differential    Collection Time: 10/23/20  6:28 PM    Specimen: Blood   Result Value Ref Range    WBC 14.31 (H) 3.40 - 10.80 10*3/mm3    RBC 3.59 (L) 4.14 - 5.80 10*6/mm3    Hemoglobin 11.7 (L) 13.0 - 17.7 g/dL    Hematocrit 35.3 (L) 37.5 - 51.0 %    MCV 98.3 (H) 79.0 - 97.0 fL    MCH 32.6 26.6 - 33.0 pg    MCHC 33.1 31.5 - 35.7 g/dL    RDW 12.2 (L) 12.3 - 15.4 %    RDW-SD 43.9 37.0 - 54.0 fl    MPV 9.7 6.0 - 12.0 fL    Platelets 247 140 - 450 10*3/mm3    Neutrophil % 82.6 (H) 42.7 - 76.0 %    Lymphocyte % 4.8 (L) 19.6 - 45.3 %    Monocyte % 10.4 5.0 - 12.0 %    Eosinophil % 1.3 0.3 - 6.2 %    Basophil % 0.4 0.0 - 1.5 %    Immature Grans % 0.5 0.0 - 0.5 %    Neutrophils, Absolute 11.82 (H) 1.70 - 7.00 10*3/mm3    Lymphocytes, Absolute 0.69 (L) 0.70 - 3.10 10*3/mm3    Monocytes, Absolute 1.49 (H) 0.10 - 0.90 10*3/mm3    Eosinophils, Absolute 0.18 0.00 - 0.40 10*3/mm3     Basophils, Absolute 0.06 0.00 - 0.20 10*3/mm3    Immature Grans, Absolute 0.07 (H) 0.00 - 0.05 10*3/mm3    nRBC 0.0 0.0 - 0.2 /100 WBC   Urinalysis With Microscopic If Indicated (No Culture) - Urine, Catheter    Collection Time: 10/23/20  6:28 PM    Specimen: Urine, Catheter   Result Value Ref Range    Color, UA Yellow Yellow, Straw    Appearance, UA Cloudy (A) Clear    pH, UA 6.5 5.0 - 8.0    Specific Gravity, UA 1.019 1.001 - 1.030    Glucose, UA Negative Negative    Ketones, UA Negative Negative    Bilirubin, UA Negative Negative    Blood, UA Large (3+) (A) Negative    Protein, UA 30 mg/dL (1+) (A) Negative    Leuk Esterase, UA Large (3+) (A) Negative    Nitrite, UA Negative Negative    Urobilinogen, UA 1.0 E.U./dL 0.2 - 1.0 E.U./dL   Urinalysis, Microscopic Only - Urine, Catheter    Collection Time: 10/23/20  6:28 PM    Specimen: Urine, Catheter   Result Value Ref Range    RBC, UA 31-50 (A) None Seen, 0-2 /HPF    WBC, UA Too Numerous to Count (A) None Seen, 0-2 /HPF    Bacteria, UA 4+ (A) None Seen, Trace /HPF    Squamous Epithelial Cells, UA None Seen None Seen, 0-2 /HPF    Hyaline Casts, UA 0-6 0 - 6 /LPF    Methodology Automated Microscopy        Ordered the above labs and independently reviewed the results.        RADIOLOGY  No Radiology Exams Resulted Within Past 24 Hours        PROCEDURES    Procedures      MEDICATIONS GIVEN IN ER    Medications   sodium chloride 0.9 % flush 10 mL (has no administration in time range)   cefTRIAXone (ROCEPHIN) 1 g/100 mL 0.9% NS (MBP) (1 g Intravenous New Bag 10/23/20 1920)         PROGRESS, DATA ANALYSIS, CONSULTS, AND MEDICAL DECISION MAKING    All labs have been independently reviewed by me.  All radiology studies have been reviewed by me and the radiologist dictating the report.   EKG's have been independently viewed and interpreted by me.      Differential diagnoses: Consider prostate issues versus UTI versus other forms of bladder outlet obstruction.      ED Course as  of Oct 23 1953   Fri Oct 23, 2020   1949 I have asked nursing staff to give the patient some of our catheters and I have asked the patient to check with his insurance company again about getting catheters on a regular basis.    [MS]      ED Course User Index  [MS] Ashutosh Velazquez MD       I had nursing staff give the patient a soft rubber catheter.  He was able to easily catheterize himself and get urine flowing.  He does have a urinary tract infection for which we have administered Rocephin and culture his urine.  Patient definitely wishes to go back to his personal care facility at [facility].  He understands that he may need to return if he worsens.      AS OF 19:53 EDT VITALS:    BP - 102/82  HR - 91  TEMP - 100 °F (37.8 °C) (Oral)  O2 SATS - 94%        DIAGNOSIS  Final diagnoses:   Acute UTI   Bladder outlet obstruction         DISPOSITION  DISCHARGE    FOLLOW-UP  Daniel Adams MD  [address] [place] [address] [facility] [id number] [phone]      NEXT AVAILABLE APPOINTMENT - RECHECK OF CONDITION    [facility] Emergency Department  [address] [place] [id number] [phone]    IF YOU HAVE ANY CONCERN OF WORSENING CONDITION         Medication List      New Prescriptions    cefdinir 300 MG capsule  Commonly known as: OMNICEF  Take 1 capsule by mouth 2 (Two) Times a Day.           Where to Get Your Medications      You can get these medications from any pharmacy    Bring a paper prescription for each of these medications  · cefdinir 300 MG capsule                  Ashutosh Velazquez MD  10/23/20 1953

## 2020-10-23 NOTE — DISCHARGE INSTRUCTIONS
Push fluids.  Utilize the catheters that we have provided for you.  Talk with your insurance company about getting some of the catheters that we have provided for you.  This may make passing urine much more easy and may prevent you from having to come back to the emergency department.    If you are currently taking amoxicillin, another antibiotic, discontinue this use while taking Omnicef that I have prescribed for you.  Take your next dose of Omnicef tomorrow morning.    If you have any concerns of worsening condition please return immediately to the emergency department.    Please review the medications you are supposed to be taking according to prior physician recommendations. I have not changed your home medications during this visit. If your discharge instructions indicate that I have changed your home medications, this is not the case, and you should disregard. If you have any questions about the medication you should be taking at home, please call your physician.

## 2020-10-27 LAB — BACTERIA SPEC AEROBE CULT: ABNORMAL

## 2020-11-16 ENCOUNTER — HOSPITAL ENCOUNTER (EMERGENCY)
Facility: HOSPITAL | Age: 85
Discharge: HOME | End: 2020-11-16
Attending: EMERGENCY MEDICINE | Admitting: EMERGENCY MEDICINE

## 2020-11-16 VITALS
RESPIRATION RATE: 16 BRPM | WEIGHT: 153 LBS | TEMPERATURE: 99 F | BODY MASS INDEX: 21.42 KG/M2 | DIASTOLIC BLOOD PRESSURE: 100 MMHG | SYSTOLIC BLOOD PRESSURE: 141 MMHG | HEART RATE: 84 BPM | OXYGEN SATURATION: 94 % | HEIGHT: 71 IN

## 2020-11-16 DIAGNOSIS — R33.9 URINARY RETENTION: ICD-10-CM

## 2020-11-16 DIAGNOSIS — N39.0 ACUTE UTI: Primary | ICD-10-CM

## 2020-11-16 DIAGNOSIS — R31.9 HEMATURIA, UNSPECIFIED TYPE: ICD-10-CM

## 2020-11-16 DIAGNOSIS — Z78.9 SELF-CATHETERIZES URINARY BLADDER: ICD-10-CM

## 2020-11-16 DIAGNOSIS — Z79.01 ANTICOAGULATED: ICD-10-CM

## 2020-11-16 LAB
ALBUMIN SERPL-MCNC: 3 G/DL (ref 3.5–5.2)
ALBUMIN/GLOB SERPL: 0.8 G/DL
ALP SERPL-CCNC: 293 U/L (ref 39–117)
ALT SERPL W P-5'-P-CCNC: 25 U/L (ref 1–41)
ANION GAP SERPL CALCULATED.3IONS-SCNC: 9 MMOL/L (ref 5–15)
AST SERPL-CCNC: 32 U/L (ref 1–40)
BACTERIA UR QL AUTO: ABNORMAL /HPF
BACTERIA UR QL AUTO: ABNORMAL /HPF
BASOPHILS # BLD AUTO: 0.04 10*3/MM3 (ref 0–0.2)
BASOPHILS NFR BLD AUTO: 0.5 % (ref 0–1.5)
BILIRUB SERPL-MCNC: 0.8 MG/DL (ref 0–1.2)
BILIRUB UR QL STRIP: NEGATIVE
BILIRUB UR QL STRIP: NEGATIVE
BUN SERPL-MCNC: 13 MG/DL (ref 8–23)
BUN/CREAT SERPL: 13.4 (ref 7–25)
CALCIUM SPEC-SCNC: 9.1 MG/DL (ref 8.2–9.6)
CHLORIDE SERPL-SCNC: 102 MMOL/L (ref 98–107)
CLARITY UR: ABNORMAL
CLARITY UR: ABNORMAL
CO2 SERPL-SCNC: 26 MMOL/L (ref 22–29)
COLOR UR: YELLOW
COLOR UR: YELLOW
CREAT SERPL-MCNC: 0.97 MG/DL (ref 0.76–1.27)
DEPRECATED RDW RBC AUTO: 48.7 FL (ref 37–54)
EOSINOPHIL # BLD AUTO: 0.2 10*3/MM3 (ref 0–0.4)
EOSINOPHIL NFR BLD AUTO: 2.5 % (ref 0.3–6.2)
ERYTHROCYTE [DISTWIDTH] IN BLOOD BY AUTOMATED COUNT: 13.8 % (ref 12.3–15.4)
GFR SERPL CREATININE-BSD FRML MDRD: 72 ML/MIN/1.73
GLOBULIN UR ELPH-MCNC: 3.8 GM/DL
GLUCOSE SERPL-MCNC: 91 MG/DL (ref 65–99)
GLUCOSE UR STRIP-MCNC: NEGATIVE MG/DL
GLUCOSE UR STRIP-MCNC: NEGATIVE MG/DL
HCT VFR BLD AUTO: 33.6 % (ref 37.5–51)
HGB BLD-MCNC: 10.8 G/DL (ref 13–17.7)
HGB UR QL STRIP.AUTO: ABNORMAL
HGB UR QL STRIP.AUTO: ABNORMAL
HYALINE CASTS UR QL AUTO: ABNORMAL /LPF
HYALINE CASTS UR QL AUTO: ABNORMAL /LPF
IMM GRANULOCYTES # BLD AUTO: 0.02 10*3/MM3 (ref 0–0.05)
IMM GRANULOCYTES NFR BLD AUTO: 0.3 % (ref 0–0.5)
INR PPP: 1.51 (ref 0.85–1.16)
KETONES UR QL STRIP: NEGATIVE
KETONES UR QL STRIP: NEGATIVE
LEUKOCYTE ESTERASE UR QL STRIP.AUTO: ABNORMAL
LEUKOCYTE ESTERASE UR QL STRIP.AUTO: ABNORMAL
LYMPHOCYTES # BLD AUTO: 1.01 10*3/MM3 (ref 0.7–3.1)
LYMPHOCYTES NFR BLD AUTO: 12.9 % (ref 19.6–45.3)
MCH RBC QN AUTO: 31.4 PG (ref 26.6–33)
MCHC RBC AUTO-ENTMCNC: 32.1 G/DL (ref 31.5–35.7)
MCV RBC AUTO: 97.7 FL (ref 79–97)
MONOCYTES # BLD AUTO: 1.01 10*3/MM3 (ref 0.1–0.9)
MONOCYTES NFR BLD AUTO: 12.9 % (ref 5–12)
NEUTROPHILS NFR BLD AUTO: 5.57 10*3/MM3 (ref 1.7–7)
NEUTROPHILS NFR BLD AUTO: 70.9 % (ref 42.7–76)
NITRITE UR QL STRIP: POSITIVE
NITRITE UR QL STRIP: POSITIVE
NRBC BLD AUTO-RTO: 0 /100 WBC (ref 0–0.2)
PH UR STRIP.AUTO: 6.5 [PH] (ref 5–8)
PH UR STRIP.AUTO: 6.5 [PH] (ref 5–8)
PLATELET # BLD AUTO: 281 10*3/MM3 (ref 140–450)
PMV BLD AUTO: 10 FL (ref 6–12)
POTASSIUM SERPL-SCNC: 4.2 MMOL/L (ref 3.5–5.2)
PROT SERPL-MCNC: 6.8 G/DL (ref 6–8.5)
PROT UR QL STRIP: NEGATIVE
PROT UR QL STRIP: NEGATIVE
PROTHROMBIN TIME: 17.9 SECONDS (ref 11.5–14)
RBC # BLD AUTO: 3.44 10*6/MM3 (ref 4.14–5.8)
RBC # UR: ABNORMAL /HPF
RBC # UR: ABNORMAL /HPF
REF LAB TEST METHOD: ABNORMAL
REF LAB TEST METHOD: ABNORMAL
SODIUM SERPL-SCNC: 137 MMOL/L (ref 136–145)
SP GR UR STRIP: 1.01 (ref 1–1.03)
SP GR UR STRIP: 1.01 (ref 1–1.03)
SQUAMOUS #/AREA URNS HPF: ABNORMAL /HPF
SQUAMOUS #/AREA URNS HPF: ABNORMAL /HPF
UROBILINOGEN UR QL STRIP: ABNORMAL
UROBILINOGEN UR QL STRIP: ABNORMAL
WBC # BLD AUTO: 7.85 10*3/MM3 (ref 3.4–10.8)
WBC UR QL AUTO: ABNORMAL /HPF
WBC UR QL AUTO: ABNORMAL /HPF

## 2020-11-16 PROCEDURE — 96372 THER/PROPH/DIAG INJ SC/IM: CPT

## 2020-11-16 PROCEDURE — 85025 COMPLETE CBC W/AUTO DIFF WBC: CPT | Performed by: NURSE PRACTITIONER

## 2020-11-16 PROCEDURE — 80053 COMPREHEN METABOLIC PANEL: CPT | Performed by: NURSE PRACTITIONER

## 2020-11-16 PROCEDURE — 87186 SC STD MICRODIL/AGAR DIL: CPT | Performed by: NURSE PRACTITIONER

## 2020-11-16 PROCEDURE — 99283 EMERGENCY DEPT VISIT LOW MDM: CPT

## 2020-11-16 PROCEDURE — 81001 URINALYSIS AUTO W/SCOPE: CPT | Performed by: NURSE PRACTITIONER

## 2020-11-16 PROCEDURE — 87086 URINE CULTURE/COLONY COUNT: CPT | Performed by: NURSE PRACTITIONER

## 2020-11-16 PROCEDURE — 87077 CULTURE AEROBIC IDENTIFY: CPT | Performed by: NURSE PRACTITIONER

## 2020-11-16 PROCEDURE — 51702 INSERT TEMP BLADDER CATH: CPT

## 2020-11-16 PROCEDURE — 85610 PROTHROMBIN TIME: CPT | Performed by: NURSE PRACTITIONER

## 2020-11-16 PROCEDURE — 25010000002 CEFTRIAXONE PER 250 MG: Performed by: NURSE PRACTITIONER

## 2020-11-16 RX ORDER — CEFDINIR 300 MG/1
300 CAPSULE ORAL 2 TIMES DAILY
Qty: 20 CAPSULE | Refills: 0 | Status: SHIPPED | OUTPATIENT
Start: 2020-11-16

## 2020-11-16 RX ADMIN — LIDOCAINE HYDROCHLORIDE 1 G: 10 INJECTION, SOLUTION EPIDURAL; INFILTRATION; INTRACAUDAL; PERINEURAL at 19:52

## 2020-11-16 NOTE — ED PROVIDER NOTES
Subjective   Patient is a 89-year-old male resident of [place] at [facility]. Patient self caths from a spinal injury years ago, and today was attempting to self cath, and before he could get the catheter inserted all the way noticed blood coming from his urethra. Takes Eliquis daily for history of stroke and atrial fib. Has history of BPH. denies any pain, but states that he feels the urge to urinate, and feels that his bladder needs to be drained.      History provided by:  Patient, EMS personnel and nursing home  Blood in Urine  This is a new problem. The current episode started today. The problem has been gradually improving since onset. He describes the hematuria as gross hematuria. His pain is at a severity of 0/10. He is experiencing no pain. Pertinent negatives include no fever. His past medical history is significant for BPH.       Review of Systems   Constitutional: Negative for fever.   Genitourinary: Positive for discharge (blood) and hematuria. Negative for penile pain, penile swelling and testicular pain.   Neurological: Negative for dizziness, syncope and light-headedness.   All other systems reviewed and are negative.      Past Medical History:   Diagnosis Date   • Abnormal EKG 12/10/2019   • Acute UTI (urinary tract infection) 2/5/2020   • Afib (CMS/[facility])    • Arthritis    • CHF (congestive heart failure) (CMS/[facility])    • CVA (cerebral vascular accident) (CMS/[facility]CC)    • Diaphragmatic hernia    • Elevated troponin 12/10/2019   • Gait abnormality    • GERD (gastroesophageal reflux disease)    • Hemiplegia (CMS/HCC)    • Hyperlipidemia    • Hypertension    • Insomnia    • OAB (overactive bladder)    • Renal disorder    • Retention of urine    • Stroke (CMS/HCC)    • Subdural hematoma (CMS/HCC)    • UTI (urinary tract infection)        No Known Allergies    Past Surgical History:   Procedure Laterality Date   • BRAIN SURGERY         History reviewed. No pertinent family history.    Social History      Socioeconomic History   • Marital status:      Spouse name: Not on file   • Number of children: Not on file   • Years of education: Not on file   • Highest education level: Not on file   Tobacco Use   • Smoking status: Never Smoker   • Smokeless tobacco: Never Used   Substance and Sexual Activity   • Alcohol use: Not Currently   • Drug use: Never   • Sexual activity: Defer           Objective   Physical Exam  HENT:      Head: Normocephalic.      Right Ear: External ear normal.      Left Ear: External ear normal.   Cardiovascular:      Rate and Rhythm: Normal rate.      Pulses: Normal pulses.   Pulmonary:      Effort: Pulmonary effort is normal.      Breath sounds: Normal breath sounds.   Genitourinary:     Scrotum/Testes: Normal.      Comments: Small amount of dark red blood expressed from urethera. Non tender.   Skin:     General: Skin is warm and dry.   Neurological:      General: No focal deficit present.      Mental Status: He is alert and oriented to person, place, and time.   Psychiatric:         Mood and Affect: Mood normal.         Behavior: Behavior normal.         Procedures           ED Course      Recent Results (from the past 24 hour(s))   Urinalysis With Culture If Indicated - Urine, Catheter    Collection Time: 11/16/20  7:54 PM    Specimen: Urine, Catheter   Result Value Ref Range    Color, UA Yellow Yellow, Straw    Appearance, UA Cloudy (A) Clear    pH, UA 6.5 5.0 - 8.0    Specific Gravity, UA 1.012 1.001 - 1.030    Glucose, UA Negative Negative    Ketones, UA Negative Negative    Bilirubin, UA Negative Negative    Blood, UA Moderate (2+) (A) Negative    Protein, UA Negative Negative    Leuk Esterase, UA Large (3+) (A) Negative    Nitrite, UA Positive (A) Negative    Urobilinogen, UA 1.0 E.U./dL 0.2 - 1.0 E.U./dL   Urinalysis, Microscopic Only - Urine, Catheter    Collection Time: 11/16/20  7:54 PM    Specimen: Urine, Catheter   Result Value Ref Range    RBC, UA 21-30 (A) None Seen, 0-2  /HPF    WBC, UA Too Numerous to Count (A) None Seen, 0-2 /HPF    Bacteria, UA 3+ (A) None Seen, Trace /HPF    Squamous Epithelial Cells, UA None Seen None Seen, 0-2 /HPF    Hyaline Casts, UA 0-6 0 - 6 /LPF    Methodology Automated Microscopy    Urine Culture - Urine, Urine, Catheter    Collection Time: 11/16/20  7:54 PM    Specimen: Urine, Catheter   Result Value Ref Range    Urine Culture >100,000 CFU/mL Gram Negative Bacilli (A)      Note: In addition to lab results from this visit, the labs listed above may include labs taken at another facility or during a different encounter within the last 24 hours. Please correlate lab times with ED admission and discharge times for further clarification of the services performed during this visit.    No orders to display     Vitals:    11/16/20 2040 11/16/20 2045 11/16/20 2059 11/16/20 2109   BP:    141/100   BP Location:    Right arm   Patient Position:    Lying   Pulse:    84   Resp:    16   Temp:       TempSrc:       SpO2: 93% 92% 95% 94%   Weight:       Height:         Medications   cefTRIAXone (ROCEPHIN) 350 mg/ml in lidocaine 1% IM syringe (1 gm vial) (1 g Intramuscular Given 11/16/20 1952)     ECG/EMG Results (last 24 hours)     ** No results found for the last 24 hours. **        No orders to display       Montemayor cath placed and pt to follow up with urology in next 2 days.      No further bleeding noted after cath placed.                                 MDM    Final diagnoses:   Acute UTI   Hematuria, unspecified type   Anticoagulated   Urinary retention   Self-catheterizes urinary bladder            Missy Forrester, WILLA  11/17/20 1151

## 2020-11-18 LAB
BACTERIA SPEC AEROBE CULT: ABNORMAL
BACTERIA SPEC AEROBE CULT: ABNORMAL